# Patient Record
Sex: FEMALE | Race: WHITE | NOT HISPANIC OR LATINO | Employment: UNEMPLOYED | URBAN - METROPOLITAN AREA
[De-identification: names, ages, dates, MRNs, and addresses within clinical notes are randomized per-mention and may not be internally consistent; named-entity substitution may affect disease eponyms.]

---

## 2016-02-16 LAB — PAP SMEAR - HIM PATIENT REPORTED: NEGATIVE

## 2017-02-15 ENCOUNTER — TELEPHONE (OUTPATIENT)
Dept: PALLIATIVE MEDICINE | Facility: CLINIC | Age: 22
End: 2017-02-15
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
## 2017-02-17 ENCOUNTER — TELEPHONE (OUTPATIENT)
Dept: ORTHOPEDICS | Facility: CLINIC | Age: 22
End: 2017-02-17
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
## 2018-03-27 LAB
ABO + RH BLD: NORMAL
ABO + RH BLD: NORMAL
BLD GP AB SCN SERPL QL: NEGATIVE
HBV SURFACE AG SERPL QL IA: NEGATIVE
HIV 1+2 AB+HIV1 P24 AG SERPL QL IA: NEGATIVE
RUBELLA ANTIBODY IGG QUANTITATIVE: NORMAL IU/ML
T PALLIDUM IGG SER QL: NEGATIVE

## 2018-05-13 ENCOUNTER — NURSE TRIAGE (OUTPATIENT)
Dept: NURSING | Facility: CLINIC | Age: 23
End: 2018-05-13
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
## 2018-06-04 ENCOUNTER — PRE VISIT (OUTPATIENT)
Dept: MATERNAL FETAL MEDICINE | Facility: CLINIC | Age: 23
End: 2018-06-04

## 2018-06-11 ENCOUNTER — OFFICE VISIT (OUTPATIENT)
Dept: MATERNAL FETAL MEDICINE | Facility: CLINIC | Age: 23
End: 2018-06-11
Attending: REGISTERED NURSE
Payer: COMMERCIAL

## 2018-06-11 ENCOUNTER — HOSPITAL ENCOUNTER (OUTPATIENT)
Dept: ULTRASOUND IMAGING | Facility: CLINIC | Age: 23
Discharge: HOME | End: 2018-06-11
Attending: REGISTERED NURSE | Admitting: REGISTERED NURSE
Payer: COMMERCIAL

## 2018-06-11 DIAGNOSIS — O26.90 PREGNANCY RELATED CONDITION, ANTEPARTUM: ICD-10-CM

## 2018-06-11 DIAGNOSIS — O35.9XX0 FETAL ABNORMALITY AFFECTING MANAGEMENT OF MOTHER, ANTEPARTUM, SINGLE OR UNSPECIFIED FETUS: ICD-10-CM

## 2018-06-11 PROCEDURE — 76811 OB US DETAILED SNGL FETUS: CPT

## 2018-06-11 NOTE — MR AVS SNAPSHOT
After Visit Summary   6/11/2018    Katia Rivzi    MRN: [id number]           Patient Information     Date Of Birth          1995        Visit Information        Provider Department      6/11/2018 11:30 AM Coral Merrill MD [facility] Maternal Fetal Medicine [facility]        Today's Diagnoses     Evaluate anatomy not seen on prior sonogram    -  1    Fetal abnormality affecting management of mother, antepartum, single or unspecified fetus           Follow-ups after your visit        Your next 10 appointments already scheduled     Jul 02, 2018 11:00 AM CDT   MFM US COMPRE SINGLE F/U with [id number]   [facility] Fetal Medicine Ultrasound - [facility] ([facility])    [address] [place] [address] [address]  [facility] [id number] [phone]           Wear comfortable clothes and leave your valuables at home.            Jul 02, 2018 11:30 AM CDT   Radiology MD with SH PATRICK LANDRY   [facility] Maternal Fetal Medicine [facility] ([facility])    [address] [place] [address] [address]  [facility] [id number] [phone]           Please arrive at the time given for your first appointment. This visit is used internally to schedule the physician's time during your ultrasound.              Future tests that were ordered for you today     Open Future Orders        Priority Expected Expires Ordered    MFM US Comprehensive Single F/U Routine 7/2/2018 6/11/2019 6/11/2018            Who to contact     If you have questions or need follow up information about today's clinic visit or your schedule please contact [facility]NAL FET[facility] directly at [phone].  Normal or non-critical lab and imaging results will be communicated to you by MyChart, letter or phone within 4 business days after the clinic has received the results. If you do not hear from us within 7 days, please contact the clinic through MyChart or phone. If you have a critical or abnormal lab  "result, we will notify you by phone as soon as possible.  Submit refill requests through [employer] or call your pharmacy and they will forward the refill request to us. Please allow 3 business days for your refill to be completed.          Additional Information About Your Visit        [employer] Information     [employer] lets you send messages to your doctor, view your test results, renew your prescriptions, schedule appointments and more. To sign up, go to [email or web address] . Click on \"Log in\" on the left side of the screen, which will take you to the Welcome page. Then click on \"Sign up Now\" on the right side of the page.     You will be asked to enter the access code listed below, as well as some personal information. Please follow the directions to create your username and password.     Your access code is: [email or web address]  Expires: 2018  3:09 PM     Your access code will  in 90 days. If you need help or a new code, please call your [place] clinic or [phone].        Care EveryWhere ID     This is your Care EveryWhere ID. This could be used by other organizations to access your [place] medical records  [id number]         Blood Pressure from Last 3 Encounters:   16 130/78   16 130/82   16 136/88    Weight from Last 3 Encounters:   16 113.4 kg (250 lb)   16 118.2 kg (260 lb 9.6 oz)   16 (!) 138.3 kg (305 lb)                 Today's Medication Changes          These changes are accurate as of 18  3:09 PM.  If you have any questions, ask your nurse or doctor.               These medicines have changed or have updated prescriptions.        Dose/Directions    cyclobenzaprine 10 MG tablet   Commonly known as:  FLEXERIL   This may have changed:  how much to take   Used for:  Neck pain        Dose:  10 mg   Take 1 tablet (10 mg) by mouth nightly as needed for muscle spasms   Quantity:  30 tablet   Refills:  0                Primary Care Provider Fax #    Physician " No Ref-Primary [phone]       No address on file        Equal Access to Services     NATHAN BERNARDBRINDA : Hadii eddie william marquita Sojohn, wapremada luqadaha, qaglynnta kagregory ronaltl, waxtonja sharmainein hayaaconstance villedajoe onofre la'shayeconstance murphy. So [facility] [phone].    ATENCIÓN: Si habla español, tiene a kiran disposición servicios gratuitos de asistencia lingüística. Llame al [phone].    We comply with applicable federal civil rights laws and [place] laws. We do not discriminate on the basis of race, color, national origin, age, disability, sex, sexual orientation, or gender identity.            Thank you!     Thank you for choosing [facility]  for your care. Our goal is always to provide you with excellent care. Hearing back from our patients is one way we can continue to improve our services. Please take a few minutes to complete the written survey that you may receive in the mail after your visit with us. Thank you!             Your Updated Medication List - Protect others around you: Learn how to safely use, store and throw away your medicines at www.disposemymeds.org.          This list is accurate as of 6/11/18  3:09 PM.  Always use your most recent med list.                   Brand Name Dispense Instructions for use Diagnosis    carisoprodol 350 MG tablet    SOMA    30 tablet    Take 1 tablet (350 mg) by mouth 3 times daily as needed for muscle spasms    Cervical strain, acute, sequela       cyclobenzaprine 10 MG tablet    FLEXERIL    30 tablet    Take 1 tablet (10 mg) by mouth nightly as needed for muscle spasms    Neck pain       EXCEDRIN MIGRAINE PO      Take 1 tablet by mouth as needed        ibuprofen 800 MG tablet    ADVIL/MOTRIN    45 tablet    Take 1 tablet (800 mg) by mouth every 8 hours    Left low back pain       methocarbamol 500 MG tablet    ROBAXIN    180 tablet    Take 1-2 tablets (500-1,000 mg) by mouth 3 times daily as needed for muscle spasms    Muscle spasm       order for  DME     1 Device    Equipment being ordered: cervical collar    Cervical strain, acute, sequela       topiramate 25 MG tablet    TOPAMAX    70 tablet    Take 1 tablet (25 mg) at bedtime for 1 week, then 1 tablet twice daily for 1 week, then 1 tablet in AM and 2 in PM for 1 week, then 2 tablets twice daily.    Chronic tension-type headache, intractable

## 2018-06-11 NOTE — PROGRESS NOTES
Please see full imaging report from ViewPoint program under imaging tab.      Coral Merrill MD  Maternal Fetal Medicine

## 2018-06-21 ENCOUNTER — TRANSFERRED RECORDS (OUTPATIENT)
Dept: HEALTH INFORMATION MANAGEMENT | Facility: CLINIC | Age: 23
End: 2018-06-21

## 2018-07-02 ENCOUNTER — HOSPITAL ENCOUNTER (OUTPATIENT)
Dept: ULTRASOUND IMAGING | Facility: CLINIC | Age: 23
Discharge: HOME | End: 2018-07-02
Attending: OBSTETRICS & GYNECOLOGY | Admitting: OBSTETRICS & GYNECOLOGY
Payer: COMMERCIAL

## 2018-07-02 ENCOUNTER — OFFICE VISIT (OUTPATIENT)
Dept: MATERNAL FETAL MEDICINE | Facility: CLINIC | Age: 23
End: 2018-07-02
Attending: OBSTETRICS & GYNECOLOGY
Payer: COMMERCIAL

## 2018-07-02 DIAGNOSIS — Z03.73 SUSPECTED FETAL ANOMALY NOT FOUND: ICD-10-CM

## 2018-07-02 DIAGNOSIS — O99.210 MATERNAL OBESITY AFFECTING PREGNANCY, ANTEPARTUM: Primary | ICD-10-CM

## 2018-07-02 DIAGNOSIS — O35.9XX0 SUSPECTED FETAL ABNORMALITY AFFECTING MANAGEMENT OF MOTHER, ANTEPARTUM, SINGLE OR UNSPECIFIED FETUS: ICD-10-CM

## 2018-07-02 PROCEDURE — 76816 OB US FOLLOW-UP PER FETUS: CPT

## 2018-07-02 NOTE — PROGRESS NOTES
Please refer to ultrasound report under 'Imaging' Studies of 'Chart Review' tabs.    Arturo Rocha M.D.

## 2018-07-02 NOTE — MR AVS SNAPSHOT
"                  MRN:[id number]                      After Visit Summary   2018    Katia Rizvi    MRN: [id number]           Visit Information        Provider Department      2018 11:30 AM Arturo Rocha MD [facility] Maternal Fetal [facility]        Anshul Information     [employer] lets you send messages to your doctor, view your test results, renew your prescriptions, schedule appointments and more. To sign up, go to [email or web address] . Click on \"Log in\" on the left side of the screen, which will take you to the Welcome page. Then click on \"Sign up Now\" on the right side of the page.     You will be asked to enter the access code listed below, as well as some personal information. Please follow the directions to create your username and password.     Your access code is: [email or web address]  Expires: 2018  3:09 PM     Your access code will  in 90 days. If you need help or a new code, please call your [place] clinic or [phone].        Care EveryWhere ID     This is your Care EveryWhere ID. This could be used by other organizations to access your [place] medical records  [id number]        Equal Access to Services     NATHAN ROSAS AH: Brianna Samuel, waezekiel allen, qalana kaalmada hi, james murphy. So [facility] [phone].    ATENCIÓN: Si habla español, tiene a kiran disposición servicios gratuitos de asistencia lingüística. Llame al [phone].    We comply with applicable federal civil rights laws and [place] laws. We do not discriminate on the basis of race, color, national origin, age, disability, sex, sexual orientation, or gender identity.            "

## 2018-07-12 ENCOUNTER — TRANSFERRED RECORDS (OUTPATIENT)
Dept: MATERNAL FETAL MEDICINE | Facility: CLINIC | Age: 23
End: 2018-07-12

## 2018-07-18 ENCOUNTER — HOSPITAL ENCOUNTER (EMERGENCY)
Facility: CLINIC | Age: 23
Discharge: HOME | End: 2018-07-18
Attending: EMERGENCY MEDICINE | Admitting: EMERGENCY MEDICINE
Payer: COMMERCIAL

## 2018-07-18 ENCOUNTER — APPOINTMENT (OUTPATIENT)
Dept: ULTRASOUND IMAGING | Facility: CLINIC | Age: 23
End: 2018-07-18
Attending: EMERGENCY MEDICINE
Payer: COMMERCIAL

## 2018-07-18 VITALS
SYSTOLIC BLOOD PRESSURE: 112 MMHG | HEART RATE: 94 BPM | DIASTOLIC BLOOD PRESSURE: 55 MMHG | WEIGHT: 291.01 LBS | BODY MASS INDEX: 54.99 KG/M2 | RESPIRATION RATE: 18 BRPM | TEMPERATURE: 97.2 F | OXYGEN SATURATION: 98 %

## 2018-07-18 DIAGNOSIS — Z3A.24 24 WEEKS GESTATION OF PREGNANCY: ICD-10-CM

## 2018-07-18 DIAGNOSIS — K21.9 GASTROESOPHAGEAL REFLUX DISEASE, ESOPHAGITIS PRESENCE NOT SPECIFIED: ICD-10-CM

## 2018-07-18 DIAGNOSIS — K80.20 CALCULUS OF GALLBLADDER WITHOUT CHOLECYSTITIS WITHOUT OBSTRUCTION: ICD-10-CM

## 2018-07-18 LAB
ALBUMIN SERPL-MCNC: 2.7 G/DL (ref 3.4–5)
ALBUMIN UR-MCNC: 30 MG/DL
ALP SERPL-CCNC: 60 U/L (ref 40–150)
ALT SERPL W P-5'-P-CCNC: 15 U/L (ref 0–50)
ANION GAP SERPL CALCULATED.3IONS-SCNC: 11 MMOL/L (ref 3–14)
APPEARANCE UR: ABNORMAL
AST SERPL W P-5'-P-CCNC: 15 U/L (ref 0–45)
BASOPHILS # BLD AUTO: 0 10E9/L (ref 0–0.2)
BASOPHILS NFR BLD AUTO: 0.1 %
BILIRUB SERPL-MCNC: 0.3 MG/DL (ref 0.2–1.3)
BILIRUB UR QL STRIP: NEGATIVE
BUN SERPL-MCNC: 8 MG/DL (ref 7–30)
CALCIUM SERPL-MCNC: 8.5 MG/DL (ref 8.5–10.1)
CAOX CRY #/AREA URNS HPF: ABNORMAL /HPF
CHLORIDE SERPL-SCNC: 108 MMOL/L (ref 94–109)
CO2 SERPL-SCNC: 22 MMOL/L (ref 20–32)
COLOR UR AUTO: YELLOW
CREAT SERPL-MCNC: 0.61 MG/DL (ref 0.52–1.04)
DIFFERENTIAL METHOD BLD: ABNORMAL
EOSINOPHIL # BLD AUTO: 0.2 10E9/L (ref 0–0.7)
EOSINOPHIL NFR BLD AUTO: 1.1 %
ERYTHROCYTE [DISTWIDTH] IN BLOOD BY AUTOMATED COUNT: 13.3 % (ref 10–15)
GFR SERPL CREATININE-BSD FRML MDRD: >90 ML/MIN/1.7M2
GLUCOSE SERPL-MCNC: 105 MG/DL (ref 70–99)
GLUCOSE UR STRIP-MCNC: NEGATIVE MG/DL
HCT VFR BLD AUTO: 30.3 % (ref 35–47)
HGB BLD-MCNC: 10.2 G/DL (ref 11.7–15.7)
HGB UR QL STRIP: NEGATIVE
IMM GRANULOCYTES # BLD: 0.1 10E9/L (ref 0–0.4)
IMM GRANULOCYTES NFR BLD: 0.7 %
INTERPRETATION ECG - MUSE: NORMAL
KETONES UR STRIP-MCNC: 10 MG/DL
LEUKOCYTE ESTERASE UR QL STRIP: NEGATIVE
LYMPHOCYTES # BLD AUTO: 3 10E9/L (ref 0.8–5.3)
LYMPHOCYTES NFR BLD AUTO: 20 %
MCH RBC QN AUTO: 30.3 PG (ref 26.5–33)
MCHC RBC AUTO-ENTMCNC: 33.7 G/DL (ref 31.5–36.5)
MCV RBC AUTO: 90 FL (ref 78–100)
MONOCYTES # BLD AUTO: 1.2 10E9/L (ref 0–1.3)
MONOCYTES NFR BLD AUTO: 8.2 %
MUCOUS THREADS #/AREA URNS LPF: PRESENT /LPF
NEUTROPHILS # BLD AUTO: 10.3 10E9/L (ref 1.6–8.3)
NEUTROPHILS NFR BLD AUTO: 69.9 %
NITRATE UR QL: NEGATIVE
NRBC # BLD AUTO: 0 10*3/UL
NRBC BLD AUTO-RTO: 0 /100
PH UR STRIP: 6 PH (ref 5–7)
PLATELET # BLD AUTO: 262 10E9/L (ref 150–450)
POTASSIUM SERPL-SCNC: 3.6 MMOL/L (ref 3.4–5.3)
PROT SERPL-MCNC: 6.3 G/DL (ref 6.8–8.8)
RBC # BLD AUTO: 3.37 10E12/L (ref 3.8–5.2)
RBC #/AREA URNS AUTO: 0 /HPF (ref 0–2)
SODIUM SERPL-SCNC: 141 MMOL/L (ref 133–144)
SOURCE: ABNORMAL
SP GR UR STRIP: 1.02 (ref 1–1.03)
SPERM #/AREA URNS HPF: PRESENT /HPF
UROBILINOGEN UR STRIP-MCNC: 2 MG/DL (ref 0–2)
WBC # BLD AUTO: 14.7 10E9/L (ref 4–11)
WBC #/AREA URNS AUTO: 3 /HPF (ref 0–5)

## 2018-07-18 PROCEDURE — 99285 EMERGENCY DEPT VISIT HI MDM: CPT | Mod: 25

## 2018-07-18 PROCEDURE — 93005 ELECTROCARDIOGRAM TRACING: CPT

## 2018-07-18 PROCEDURE — 76705 ECHO EXAM OF ABDOMEN: CPT

## 2018-07-18 PROCEDURE — 83690 ASSAY OF LIPASE: CPT | Performed by: EMERGENCY MEDICINE

## 2018-07-18 PROCEDURE — 80053 COMPREHEN METABOLIC PANEL: CPT | Performed by: EMERGENCY MEDICINE

## 2018-07-18 PROCEDURE — 85025 COMPLETE CBC W/AUTO DIFF WBC: CPT | Performed by: EMERGENCY MEDICINE

## 2018-07-18 PROCEDURE — 81001 URINALYSIS AUTO W/SCOPE: CPT | Performed by: EMERGENCY MEDICINE

## 2018-07-18 RX ORDER — ONDANSETRON 4 MG/1
4 TABLET, ORALLY DISINTEGRATING ORAL EVERY 8 HOURS PRN
Qty: 10 TABLET | Refills: 0 | Status: SHIPPED | OUTPATIENT
Start: 2018-07-18 | End: 2018-07-21

## 2018-07-18 RX ORDER — ONDANSETRON 4 MG/1
4 TABLET, ORALLY DISINTEGRATING ORAL ONCE
Status: DISCONTINUED | OUTPATIENT
Start: 2018-07-18 | End: 2018-07-18

## 2018-07-18 RX ORDER — HYDROCODONE BITARTRATE AND ACETAMINOPHEN 5; 325 MG/1; MG/1
1 TABLET ORAL EVERY 6 HOURS PRN
Qty: 10 TABLET | Refills: 0 | Status: SHIPPED | OUTPATIENT
Start: 2018-07-18 | End: 2018-07-30

## 2018-07-18 ASSESSMENT — ENCOUNTER SYMPTOMS
FLANK PAIN: 0
VOMITING: 0
SHORTNESS OF BREATH: 0
NAUSEA: 0
ABDOMINAL PAIN: 1
BACK PAIN: 1
DYSURIA: 1
DIARRHEA: 1
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
## 2018-07-18 NOTE — ED AVS SNAPSHOT
Emergency Department    [address] [facility] [address]    Phone:  [phone]    Fax:  [phone]                                       Katia Rizvi   MRN: [id number]    Department:   Emergency Department   Date of Visit:  7/18/2018           Patient Information     Date Of Birth          1995        Your diagnoses for this visit were:     Gastroesophageal reflux disease, esophagitis presence not specified     Calculus of gallbladder without cholecystitis without obstruction     24 weeks gestation of pregnancy        You were seen by Shahzad Snow MD.      Follow-up Information     Follow up with your ob/gyn. Call today.    Why:  to discuss symptoms and to schedule follow up        Follow up with [facility]. Schedule an appointment as soon as possible for a visit in 1 week.    Contact information:    [address], [address]  [facility] [address] [phone]        Follow up with  Emergency Department.    Specialty:  EMERGENCY MEDICINE    Why:  If symptoms worsen    Contact information:    [address] [facility] [address] [phone]        Discharge Instructions         Gallstones with Biliary Colic    You have abdominal pain due to irritation and spasm of the gallbladder. This is called biliary colic. The gallbladder is a small sac under the liver, which stores and releases a fluid that aids in the digestion of fat. A collection of crystals may form stones inside the gallbladder (gallstones). Gallstones can cause the gallbladder to spasm. If they block the duct out of the gallbladder, they can cause pain and even an infection.   A number of factors increase the risk for having gallstones:    Being female    Being severely overweight (obese)    Older age    Losing or gaining weight quickly    Eating a high-calorie diet    Being pregnant    Taking hormone therapy    Having diabetes  Home care    Rest in bed.    Drink only  clear liquids until you feel better.    You may have been prescribed medicine for pain or nausea. Take these as directed.    Fat in your diet makes the gallbladder contract and may cause increased pain. Avoid foods that are high in fat (such as full-fat dairy, fried foods, and fatty meats) for at least two days.    If you are overweight, talk to your healthcare provider about losing weight.  Follow-up care  Follow up with your healthcare provider or as advised. There is a chance that you will have another episode of pain from your gallstones at some point. Removal of the gallbladder is an option to prevent this. Talk with your healthcare provider about your treatment options.  When to seek medical advice  Call your healthcare provider if any of the following occur:    Worsening pain or pain lasting for longer than 6 hours    Pain moving to the right lower abdomen    Repeated vomiting    Swollen abdomen    Fever of 100.4 F (38 C) or higher, or as directed by your healthcare provider    Very dark urine, light colored stools, or yellow color of the skin or eyes    Chest, arm, back, neck or jaw pain  Date Last Reviewed: 6/18/2015 2000-2017 [employer]. [address] [place] [address]. All rights reserved. This information is not intended as a substitute for professional medical care. Always follow your healthcare professional's instructions.          GERD (Adult)    The esophagus is a tube that carries food from the mouth to the stomach. A valve at the lower end of the esophagus prevents stomach acid from flowing upward. When this valve doesn't work properly, stomach contents may repeatedly flow back up (reflux) into the esophagus. This is called gastroesophageal reflux disease (GERD). GERD can irritate the esophagus. It can cause problems with swallowing or breathing. In severe cases, GERD can cause recurrent pneumonia or other serious problems.  Symptoms of reflux include burning, pressure  "or sharp pain in the upper abdomen or mid to lower chest. The pain can spread to the neck, back, or shoulder. There may be belching, an acid taste in the back of the throat, chronic cough, or sore throat or hoarseness. GERD symptoms often occur during the day after a big meal. They can also occur at night when lying down.   Home care  Lifestyle changes can help reduce symptoms. If needed, medicines may be prescribed. Symptoms often improve with treatment, but if treatment is stopped, the symptoms often return after a few months. So most persons with GERD will need to continue treatment.  Lifestyle changes    Limit or avoid fatty, fried, and spicy foods, as well as coffee, chocolate, mint, and foods with high acid content such as tomatoes and citrus fruit and juices (orange, grapefruit, lemon).    Don t eat large meals, especially at night. Frequent, smaller meals are best. Do not lie down right after eating. And don t eat anything 3 hours before going to bed.    Avoid drinking alcohol and smoking. As much as possible, stay away from second hand smoke.    If you are overweight, losing weight will reduce symptoms.     Avoid wearing tight clothing around your stomach area.    If your symptoms occur during sleep, use a foam wedge to elevate your upper body (not just your head.) Or, place 4\" blocks under the head of your bed.  Medicines  If needed, medicines can help relieve the symptoms of GERD and prevent damage to the esophagus. Discuss a medicine plan with your healthcare provider. This may include one or more of the following medicines:    Antacids to help neutralize the normal acids in your stomach.    Acid blockers (H2 blockers) to decrease acid production.    Acid inhibitors (PPIs) to decrease acid production in a different way than the blockers. They may work better, but can take a little longer to take effect.  Take an antacid 30-60 minutes after eating and at bedtime, but not at the same time as an acid " blocker.  Try not to take medicines such as ibuprofen and aspirin. If you are taking aspirin for your heart or other medical reasons, talk to your healthcare provider about stopping it.  Follow-up care  Follow up with your healthcare provider or as advised by our staff.  When to seek medical advice  Call your healthcare provider if any of the following occur:    Stomach pain gets worse or moves to the lower right abdomen (appendix area)    Chest pain appears or gets worse, or spreads to the back, neck, shoulder, or arm    Frequent vomiting (can t keep down liquids)    Blood in the stool or vomit (red or black in color)    Feeling weak or dizzy    Fever of 100.4 F (38 C) or higher, or as directed by your healthcare provider  Date Last Reviewed: 6/23/2015 2000-2017 [employer]. [address] [place] [address], [place] [address]. All rights reserved. This information is not intended as a substitute for professional medical care. Always follow your healthcare professional's instructions.          Discharge Instructions for Gallstones  Gallstones form when liquid stored in the gallbladder hardens into pieces of stone-like material. Stones in the gallbladder may or may not cause symptoms. They can cause pain or infection. You and your healthcare provider will decide on the best treatment for you. Here's what you can do.  Home care  These home care steps can help you after being diagnosed with gallstones:    Eat a low-fat diet.  ? Read food labels to be sure the foods you are choosing are low in fat.  ? Limit the use of high-fat meats, dairy products, animal fats, and vegetable oils.    Keep all appointments with your healthcare provider. Your healthcare provider needs to monitor your condition.    Discuss your treatment choices with your healthcare provider, including:  ? Surgery to remove the gallbladder and gallstones  ? Medicine to dissolve the stones. This is mainly for people who cannot have surgery. Take  your medicines exactly as directed. Don't skip doses. Remember, it takes time for the medicine to take effect. Unfortunately, once the medicine is stopped, the gallstones usually come back.   ? ERCP (endoscopic retrograde cholangiopancreatography). A healthcare provider uses a thin tube with video and X-rays to locate stones and remove them from the common bile duct.  Follow-up care  Make a follow-up appointment as directed by our staff.     When to call your healthcare provider  Call your healthcare provider right away if you have any of the following:    Severe pain in the upper belly, shoulder, or back    Fever of 100.4 F (38 C) or higher, or as directed by your healthcare provider    Nausea or vomiting    Yellowing of your skin or eyes (jaundice)   Date Last Reviewed: 7/1/2016 2000-2017 [employer] [address] [place] [address]. All rights reserved. This information is not intended as a substitute for professional medical care. Always follow your healthcare professional's instructions.          Adapting to Pregnancy: Second Trimester    Keep up the healthy habits you started in your first trimester. You might be a little more tired than normal. So plan your day wisely. Look at the tips below and choose the ones that suit your lifestyle.  If you have any questions, check with your healthcare provider.   If you work  If you can, adjust your work with your employer to fit your needs. Try these tips:    If you stand for long periods, find ways to do some tasks while sitting. Also, try to stand with 1 foot resting on a low stool or ledge. Shift your weight from foot to foot often. Wear low-heeled shoes.    If you sit, keep your knees level with your hips. Rest your feet on a firm surface. Sit tall with support for your low back.    If you work long hours, ask about adjusting your schedule. Try taking shorter breaks more often.  When you travel  The second trimester may be the best time  for any travel. Talk to your healthcare provider about any special plans you may need to make. Always:    Wear a seat belt. Fasten the lap part under your belly. Wear the shoulder part also.    Take breaks often during long trips by car or plane. Move around to stretch your legs.    Drink plenty of fluids on flights. The air in plane cabins is very dry.    Avoid hot climates or high altitudes if you are not used to them.    Avoid places where the food and water might make you sick.    Make sure you are up-to-date on all immunizations, including the flu vaccine. This is especially important when traveling overseas.  Taking time to relax  Find time to rest and relax at work or at home:    Take short time-outs daily. Do relaxation exercises.    Breathe deeply during stressful times.    Try not to take on too much. Plan tasks for times when you have the most energy.    Take naps when you can. Or just sit and relax.    After week 16, avoid lying on your back for more than a few minutes. Instead, lie on your side. Switch sides often.  Continuing as lovers  Unless your healthcare provider tells you otherwise, there is no reason to stop having sex now. Blood supply increases to the pelvic area in the second trimester. Because of this, sex might be more enjoyable. Try different positions and see what s best. Also, talk to your partner about any changes in desire. Spotting may happen after sex. Be sure to let your healthcare provider know if there is heavy bleeding.  Keeping your environment safe  You can still clean house and use scented products. Just take some simple precautions:    Wear gloves when using cleaning fluids.    Open windows to let in fresh air. Use a fan if you paint.    Avoid secondhand smoke.    Don t breathe fumes from nail polish, hair spray, cleansers, or other chemicals.  Date Last Reviewed: 1/1/2018 2000-2017 The [employer] [address] [facility], [place] [address]. All rights reserved.  This information is not intended as a substitute for professional medical care. Always follow your healthcare professional's instructions.          24 Hour Appointment Hotline       To make an appointment at any [facility], call [phone] ([phone]). If you don't have a family doctor or clinic, we will help you find one. [facility] are conveniently located to serve the needs of you and your family.             Review of your medicines      START taking        Dose / Directions Last dose taken    HYDROcodone-acetaminophen 5-325 MG per tablet   Commonly known as:  NORCO   Dose:  1 tablet   Quantity:  10 tablet        Take 1 tablet by mouth every 6 hours as needed for severe pain   Refills:  0        ondansetron 4 MG ODT tab   Commonly known as:  ZOFRAN ODT   Dose:  4 mg   Quantity:  10 tablet        Take 1 tablet (4 mg) by mouth every 8 hours as needed for nausea   Refills:  0        ranitidine 150 MG tablet   Commonly known as:  ZANTAC   Dose:  150 mg   Quantity:  20 tablet        Take 1 tablet (150 mg) by mouth 2 times daily for 10 days   Refills:  0          Our records show that you are taking the medicines listed below. If these are incorrect, please call your family doctor or clinic.        Dose / Directions Last dose taken    carisoprodol 350 MG tablet   Commonly known as:  SOMA   Dose:  350 mg   Quantity:  30 tablet        Take 1 tablet (350 mg) by mouth 3 times daily as needed for muscle spasms   Refills:  0        cyclobenzaprine 10 MG tablet   Commonly known as:  FLEXERIL   Dose:  10 mg   Quantity:  30 tablet        Take 1 tablet (10 mg) by mouth nightly as needed for muscle spasms   Refills:  0        EXCEDRIN MIGRAINE PO   Dose:  1 tablet        Take 1 tablet by mouth as needed   Refills:  0        ibuprofen 800 MG tablet   Commonly known as:  ADVIL/MOTRIN   Dose:  800 mg   Quantity:  45 tablet        Take 1 tablet (800 mg) by mouth every 8 hours   Refills:  0        methocarbamol  500 MG tablet   Commonly known as:  ROBAXIN   Dose:  500-1000 mg   Quantity:  180 tablet        Take 1-2 tablets (500-1,000 mg) by mouth 3 times daily as needed for muscle spasms   Refills:  1        order for DME   Quantity:  1 Device        Equipment being ordered: cervical collar   Refills:  0        topiramate 25 MG tablet   Commonly known as:  TOPAMAX   Quantity:  70 tablet        Take 1 tablet (25 mg) at bedtime for 1 week, then 1 tablet twice daily for 1 week, then 1 tablet in AM and 2 in PM for 1 week, then 2 tablets twice daily.   Refills:  0                Information about OPIOIDS     PRESCRIPTION OPIOIDS: WHAT YOU NEED TO KNOW   We gave you an opioid (narcotic) pain medicine. It is important to manage your pain, but opioids are not always the best choice. You should first try all the other options your care team gave you. Take this medicine for as short a time (and as few doses) as possible.     These medicines have risks:    DO NOT drive when on new or higher doses of pain medicine. These medicines can affect your alertness and reaction times, and you could be arrested for driving under the influence (DUI). If you need to use opioids long-term, talk to your care team about driving.    DO NOT operate heave machinery    DO NOT do any other dangerous activities while taking these medicines.     DO NOT drink any alcohol while taking these medicines.      If the opioid prescribed includes acetaminophen, DO NOT take with any other medicines that contain acetaminophen. Read all labels carefully. Look for the word  acetaminophen  or  Tylenol.  Ask your pharmacist if you have questions or are unsure.    You can get addicted to pain medicines, especially if you have a history of addiction (chemical, alcohol or substance dependence). Talk to your care team about ways to reduce this risk.    Store your pills in a secure place, locked if possible. We will not replace any lost or stolen medicine. If you don t finish  your medicine, please throw away (dispose) as directed by your pharmacist. The [place] [employer]ution [employer] has more information about safe disposal: https://www.pca.[facility].[place].us/living-green/managing-unwanted-medications.     All opioids tend to cause constipation. Drink plenty of water and eat foods that have a lot of fiber, such as fruits, vegetables, prune juice, apple juice and high-fiber cereal. Take a laxative (Miralax, milk of magnesia, Colace, Senna) if you don t move your bowels at least every other day.         Prescriptions were sent or printed at these locations (3 Prescriptions)                   Other Prescriptions                Printed at Department/Unit printer (3 of 3)         HYDROcodone-acetaminophen (NORCO) 5-325 MG per tablet               ondansetron (ZOFRAN ODT) 4 MG ODT tab               ranitidine (ZANTAC) 150 MG tablet                Procedures and tests performed during your visit     Abdomen US, limited (RUQ only)    EKG 12-lead, tracing only    UA reflex to Microscopic and Culture      Orders Needing Specimen Collection     None      Pending Results     No orders found from 7/16/2018 to 7/19/2018.            Pending Culture Results     No orders found from 7/16/2018 to 7/19/2018.            Pending Results Instructions     If you had any lab results that were not finalized at the time of your Discharge, you can call the ED Lab Result RN at [phone]. You will be contacted by this team for any positive Lab results or changes in treatment. The nurses are available 7 days a week from 10A to 6:30P.  You can leave a message 24 hours per day and they will return your call.        Test Results From Your Hospital Stay        7/18/2018  5:37 AM      Narrative     US ABDOMEN LIMITED  7/18/2018 5:13 AM    CLINICAL INFORMATION: Right upper quadrant pain, twenty-four weeks  gestation.    COMPARISON: None.    FINDINGS: Limited right upper quadrant ultrasound demonstrates  multiple small  gallstones. No gallbladder wall thickening or  sonographic Esparza's sign. No bile duct dilatation. Fatty infiltration  of the liver. Visualized portions of the pancreas are negative.  Pancreatic tail is obscured by gas. The visualized portion of the  right kidney is unremarkable. No hydronephrosis on the right.        Impression     IMPRESSION:  1. Cholelithiasis without sonographic evidence of cholecystitis.  2. Fatty infiltration of the liver.    BAUTISTA BOYLE MD         7/18/2018  4:55 AM      Component Results     Component Value Ref Range & Units Status    Color Urine Yellow  Final    Appearance Urine Slightly Cloudy  Final    Glucose Urine Negative NEG^Negative mg/dL Final    Bilirubin Urine Negative NEG^Negative Final    Ketones Urine 10 (A) NEG^Negative mg/dL Final    Specific Gravity Urine 1.023 1.003 - 1.035 Final    Blood Urine Negative NEG^Negative Final    pH Urine 6.0 5.0 - 7.0 pH Final    Protein Albumin Urine 30 (A) NEG^Negative mg/dL Final    Urobilinogen mg/dL 2.0 0.0 - 2.0 mg/dL Final    Nitrite Urine Negative NEG^Negative Final    Leukocyte Esterase Urine Negative NEG^Negative Final    Source Midstream Urine  Final    RBC Urine 0 0 - 2 /HPF Final    WBC Urine 3 0 - 5 /HPF Final    Mucous Urine Present (A) NEG^Negative /LPF Final    sperm Present (A) NEG^Negative /HPF Final    Calcium Oxalate Moderate (A) NEG^Negative /HPF Final                Clinical Quality Measure: Blood Pressure Screening     Your blood pressure was checked while you were in the emergency department today. The last reading we obtained was  BP: 112/55 . Please read the guidelines below about what these numbers mean and what you should do about them.  If your systolic blood pressure (the top number) is less than 120 and your diastolic blood pressure (the bottom number) is less than 80, then your blood pressure is normal. There is nothing more that you need to do about it.  If your systolic blood pressure (the top number) is  "120-139 or your diastolic blood pressure (the bottom number) is 80-89, your blood pressure may be higher than it should be. You should have your blood pressure rechecked within a year by a primary care provider.  If your systolic blood pressure (the top number) is 140 or greater or your diastolic blood pressure (the bottom number) is 90 or greater, you may have high blood pressure. High blood pressure is treatable, but if left untreated over time it can put you at risk for heart attack, stroke, or kidney failure. You should have your blood pressure rechecked by a primary care provider within the next 4 weeks.  If your provider in the emergency department today gave you specific instructions to follow-up with your doctor or provider even sooner than that, you should follow that instruction and not wait for up to 4 weeks for your follow-up visit.        Thank you for choosing [facility]       Thank you for choosing [facility] for your care. Our goal is always to provide you with excellent care. Hearing back from our patients is one way we can continue to improve our services. Please take a few minutes to complete the written survey that you may receive in the mail after you visit with us. Thank you!        [employer] Information     [employer] lets you send messages to your doctor, view your test results, renew your prescriptions, schedule appointments and more. To sign up, go to [email or web address] . Click on \"Log in\" on the left side of the screen, which will take you to the Welcome page. Then click on \"Sign up Now\" on the right side of the page.     You will be asked to enter the access code listed below, as well as some personal information. Please follow the directions to create your username and password.     Your access code is: [email or web address]  Expires: 2018  3:09 PM     Your access code will  in 90 days. If you need help or a new code, please call your [facility] clinic or [phone].        Care EveryWhere " ID     This is your Care EveryWhere ID. This could be used by other organizations to access your [facility] medical records  [id number]        Equal Access to Services     NATHAN ROSAS : Brianna Samuel, gila allen, james sesay. So [facility] [phone].    ATENCIÓN: Si habla español, tiene a kiran disposición servicios gratuitos de asistencia lingüística. Llame al [phone].    We comply with applicable federal civil rights laws and [place] laws. We do not discriminate on the basis of race, color, national origin, age, disability, sex, sexual orientation, or gender identity.            After Visit Summary       This is your record. Keep this with you and show to your community pharmacist(s) and doctor(s) at your next visit.

## 2018-07-18 NOTE — DISCHARGE INSTRUCTIONS
Gallstones with Biliary Colic    You have abdominal pain due to irritation and spasm of the gallbladder. This is called biliary colic. The gallbladder is a small sac under the liver, which stores and releases a fluid that aids in the digestion of fat. A collection of crystals may form stones inside the gallbladder (gallstones). Gallstones can cause the gallbladder to spasm. If they block the duct out of the gallbladder, they can cause pain and even an infection.   A number of factors increase the risk for having gallstones:    Being female    Being severely overweight (obese)    Older age    Losing or gaining weight quickly    Eating a high-calorie diet    Being pregnant    Taking hormone therapy    Having diabetes  Home care    Rest in bed.    Drink only clear liquids until you feel better.    You may have been prescribed medicine for pain or nausea. Take these as directed.    Fat in your diet makes the gallbladder contract and may cause increased pain. Avoid foods that are high in fat (such as full-fat dairy, fried foods, and fatty meats) for at least two days.    If you are overweight, talk to your healthcare provider about losing weight.  Follow-up care  Follow up with your healthcare provider or as advised. There is a chance that you will have another episode of pain from your gallstones at some point. Removal of the gallbladder is an option to prevent this. Talk with your healthcare provider about your treatment options.  When to seek medical advice  Call your healthcare provider if any of the following occur:    Worsening pain or pain lasting for longer than 6 hours    Pain moving to the right lower abdomen    Repeated vomiting    Swollen abdomen    Fever of 100.4 F (38 C) or higher, or as directed by your healthcare provider    Very dark urine, light colored stools, or yellow color of the skin or eyes    Chest, arm, back, neck or jaw pain  Date Last Reviewed: 6/18/2015 2000-2017 [employer]  [employer]. [address] [place] [address]. All rights reserved. This information is not intended as a substitute for professional medical care. Always follow your healthcare professional's instructions.          GERD (Adult)    The esophagus is a tube that carries food from the mouth to the stomach. A valve at the lower end of the esophagus prevents stomach acid from flowing upward. When this valve doesn't work properly, stomach contents may repeatedly flow back up (reflux) into the esophagus. This is called gastroesophageal reflux disease (GERD). GERD can irritate the esophagus. It can cause problems with swallowing or breathing. In severe cases, GERD can cause recurrent pneumonia or other serious problems.  Symptoms of reflux include burning, pressure or sharp pain in the upper abdomen or mid to lower chest. The pain can spread to the neck, back, or shoulder. There may be belching, an acid taste in the back of the throat, chronic cough, or sore throat or hoarseness. GERD symptoms often occur during the day after a big meal. They can also occur at night when lying down.   Home care  Lifestyle changes can help reduce symptoms. If needed, medicines may be prescribed. Symptoms often improve with treatment, but if treatment is stopped, the symptoms often return after a few months. So most persons with GERD will need to continue treatment.  Lifestyle changes    Limit or avoid fatty, fried, and spicy foods, as well as coffee, chocolate, mint, and foods with high acid content such as tomatoes and citrus fruit and juices (orange, grapefruit, lemon).    Don t eat large meals, especially at night. Frequent, smaller meals are best. Do not lie down right after eating. And don t eat anything 3 hours before going to bed.    Avoid drinking alcohol and smoking. As much as possible, stay away from second hand smoke.    If you are overweight, losing weight will reduce symptoms.     Avoid wearing tight clothing around your  "stomach area.    If your symptoms occur during sleep, use a foam wedge to elevate your upper body (not just your head.) Or, place 4\" blocks under the head of your bed.  Medicines  If needed, medicines can help relieve the symptoms of GERD and prevent damage to the esophagus. Discuss a medicine plan with your healthcare provider. This may include one or more of the following medicines:    Antacids to help neutralize the normal acids in your stomach.    Acid blockers (H2 blockers) to decrease acid production.    Acid inhibitors (PPIs) to decrease acid production in a different way than the blockers. They may work better, but can take a little longer to take effect.  Take an antacid 30-60 minutes after eating and at bedtime, but not at the same time as an acid blocker.  Try not to take medicines such as ibuprofen and aspirin. If you are taking aspirin for your heart or other medical reasons, talk to your healthcare provider about stopping it.  Follow-up care  Follow up with your healthcare provider or as advised by our staff.  When to seek medical advice  Call your healthcare provider if any of the following occur:    Stomach pain gets worse or moves to the lower right abdomen (appendix area)    Chest pain appears or gets worse, or spreads to the back, neck, shoulder, or arm    Frequent vomiting (can t keep down liquids)    Blood in the stool or vomit (red or black in color)    Feeling weak or dizzy    Fever of 100.4 F (38 C) or higher, or as directed by your healthcare provider  Date Last Reviewed: 6/23/2015 2000-2017 [employer]. [address] [place] [address], [place] [address]. All rights reserved. This information is not intended as a substitute for professional medical care. Always follow your healthcare professional's instructions.          Discharge Instructions for Gallstones  Gallstones form when liquid stored in the gallbladder hardens into pieces of stone-like material. Stones in the gallbladder " may or may not cause symptoms. They can cause pain or infection. You and your healthcare provider will decide on the best treatment for you. Here's what you can do.  Home care  These home care steps can help you after being diagnosed with gallstones:    Eat a low-fat diet.  ? Read food labels to be sure the foods you are choosing are low in fat.  ? Limit the use of high-fat meats, dairy products, animal fats, and vegetable oils.    Keep all appointments with your healthcare provider. Your healthcare provider needs to monitor your condition.    Discuss your treatment choices with your healthcare provider, including:  ? Surgery to remove the gallbladder and gallstones  ? Medicine to dissolve the stones. This is mainly for people who cannot have surgery. Take your medicines exactly as directed. Don't skip doses. Remember, it takes time for the medicine to take effect. Unfortunately, once the medicine is stopped, the gallstones usually come back.   ? ERCP (endoscopic retrograde cholangiopancreatography). A healthcare provider uses a thin tube with video and X-rays to locate stones and remove them from the common bile duct.  Follow-up care  Make a follow-up appointment as directed by our staff.     When to call your healthcare provider  Call your healthcare provider right away if you have any of the following:    Severe pain in the upper belly, shoulder, or back    Fever of 100.4 F (38 C) or higher, or as directed by your healthcare provider    Nausea or vomiting    Yellowing of your skin or eyes (jaundice)   Date Last Reviewed: 7/1/2016 2000-2017 [employer]. [address] [place] [address], [place] [address]. All rights reserved. This information is not intended as a substitute for professional medical care. Always follow your healthcare professional's instructions.          Adapting to Pregnancy: Second Trimester    Keep up the healthy habits you started in your first trimester. You might be a little more  tired than normal. So plan your day wisely. Look at the tips below and choose the ones that suit your lifestyle.  If you have any questions, check with your healthcare provider.   If you work  If you can, adjust your work with your employer to fit your needs. Try these tips:    If you stand for long periods, find ways to do some tasks while sitting. Also, try to stand with 1 foot resting on a low stool or ledge. Shift your weight from foot to foot often. Wear low-heeled shoes.    If you sit, keep your knees level with your hips. Rest your feet on a firm surface. Sit tall with support for your low back.    If you work long hours, ask about adjusting your schedule. Try taking shorter breaks more often.  When you travel  The second trimester may be the best time for any travel. Talk to your healthcare provider about any special plans you may need to make. Always:    Wear a seat belt. Fasten the lap part under your belly. Wear the shoulder part also.    Take breaks often during long trips by car or plane. Move around to stretch your legs.    Drink plenty of fluids on flights. The air in plane cabins is very dry.    Avoid hot climates or high altitudes if you are not used to them.    Avoid places where the food and water might make you sick.    Make sure you are up-to-date on all immunizations, including the flu vaccine. This is especially important when traveling overseas.  Taking time to relax  Find time to rest and relax at work or at home:    Take short time-outs daily. Do relaxation exercises.    Breathe deeply during stressful times.    Try not to take on too much. Plan tasks for times when you have the most energy.    Take naps when you can. Or just sit and relax.    After week 16, avoid lying on your back for more than a few minutes. Instead, lie on your side. Switch sides often.  Continuing as lovers  Unless your healthcare provider tells you otherwise, there is no reason to stop having sex now. Blood supply  increases to the pelvic area in the second trimester. Because of this, sex might be more enjoyable. Try different positions and see what s best. Also, talk to your partner about any changes in desire. Spotting may happen after sex. Be sure to let your healthcare provider know if there is heavy bleeding.  Keeping your environment safe  You can still clean house and use scented products. Just take some simple precautions:    Wear gloves when using cleaning fluids.    Open windows to let in fresh air. Use a fan if you paint.    Avoid secondhand smoke.    Don t breathe fumes from nail polish, hair spray, cleansers, or other chemicals.  Date Last Reviewed: 1/1/2018 2000-2017 [employer]. [address] [place] [address], [place] [address]. All rights reserved. This information is not intended as a substitute for professional medical care. Always follow your healthcare professional's instructions.

## 2018-07-18 NOTE — ED AVS SNAPSHOT
Emergency Department    [address] [facility] [address]    Phone:  [phone]    Fax:  [phone]                                       Katia Rizvi   MRN: [id number]    Department:   Emergency Department   Date of Visit:  7/18/2018           After Visit Summary Signature Page     I have received my discharge instructions, and my questions have been answered. I have discussed any challenges I see with this plan with the nurse or doctor.    ..........................................................................................................................................  Patient/Patient Representative Signature      ..........................................................................................................................................  Patient Representative Print Name and Relationship to Patient    ..................................................               ................................................  Date                                            Time    ..........................................................................................................................................  Reviewed by Signature/Title    ...................................................              ..............................................  Date                                                            Time

## 2018-07-18 NOTE — ED PROVIDER NOTES
History     Chief Complaint:  Abdominal pain    HPI   Katia Rizvi is a  24 week pregnant 23 year old female who presents to the emergency department today for evaluation of abdominal pain. 2 days ago the patient had upper abdominal pain that wrapped around to her back, feeling like she is being squeezed. This was not bad, and resolved on its own. Tonight at 0200 the patient started having this same pain again, but much worse. Her pain was rated at 8/10 at its worst, but is currently down to a 6/10. She denies nausea, vomiting, chest pain, trouble breathing, flank pain and trouble with her current pregnancy, although she did have a miscarriage after 9 weeks in her last pregnancy, which was very soon before becoming pregnant again. She does note very minor dysuria and some occasional diarrhea. She has no history of heart burn or gallbladder issues.     Allergies:  No Known Drug Allergies    Medications:    Aspirin-Acetaminophen-Caffeine (EXCEDRIN MIGRAINE PO)  carisoprodol (SOMA) 350 MG tablet  cyclobenzaprine (FLEXERIL) 10 MG tablet  ibuprofen (ADVIL,MOTRIN) 800 MG tablet  methocarbamol (ROBAXIN) 500 MG tablet  ORDER FOR DME  topiramate (TOPAMAX) 25 MG tablet    Past Medical History:    History reviewed. No pertinent medical history.    Past Surgical History:    D&C    Family History:    History reviewed. No pertinent family history.    Social History:  The patient was accompanied to the ED by nobody.  Smoking Status: Never Smoker  Smokeless Tobacco: Never Used  Alcohol Use: Positive   Marital Status:  Single     Review of Systems   Respiratory: Negative for shortness of breath.    Cardiovascular: Negative for chest pain.   Gastrointestinal: Positive for abdominal pain and diarrhea. Negative for nausea and vomiting.   Genitourinary: Positive for dysuria. Negative for flank pain.   Musculoskeletal: Positive for back pain.   All other systems reviewed and are negative.    Physical Exam     Patient Vitals for  the past 24 hrs:   BP Temp Temp src Heart Rate Resp SpO2 Weight   07/18/18 0519 117/54 - - 97 - 97 % -   07/18/18 0320 147/69 97  F (36.1  C) Oral 75 25 100 % 132 kg (291 lb 0.1 oz)      Physical Exam  Constitutional:  Appears well-developed and well-nourished. Cooperative. Looks uncomfortable   HENT:   Head:    Atraumatic.   Mouth/Throat:   Oropharynx is without erythema or exudate and mucous       membranes are moist.   Eyes:    Conjunctivae normal and EOM are normal.      Pupils are equal, round, and reactive to light.   Neck:    Normal range of motion. Neck supple.   Cardiovascular:  Normal rate, regular rhythm, normal heart sounds and radial and      dorsalis pedis pulses are 2+ and symmetric.    Pulmonary/Chest:  Effort normal and breath sounds normal.   Abdominal:   Soft. Bowel sounds are normal. Palpable, non-tender uterus. Diffusely tender across    upper abdomen, right more than left.  No splenomegaly or hepatomegaly.nNo    rebound.   Musculoskeletal:  Normal range of motion. No edema and no tenderness.   Neurological:  Alert. Normal strength. No cranial nerve deficit. GCS 15.  Skin:    Skin is warm and dry.   Psychiatric:   Normal mood and affect.      Emergency Department Course     ECG:  ECG taken at 0340. ECG read at 0343.  NSR  Septal infarct, age undetermined  Abnormal ECG  Rate 85 bpm. WY interval 128 ms. QRS duration 82 ms. QT/QTc 384/456 ms. P-R-T axes 36 72 40.    Imaging:  Radiology findings were communicated with the patient who voiced understanding of the findings.    Abdomen US, limited (RUQ only)  1. Cholelithiasis without sonographic evidence of cholecystitis.  2. Fatty infiltration of the liver.  Reading per radiology    Laboratory:  Laboratory findings were communicated with the patient who voiced understanding of the findings.    UA: Ketones 10, Protein 30, Mucous present, Sperm present, calcium oxalate moderate    Interventions:  Zantac 150 mg PO Pending    Emergency Department  Course:    Nursing notes and vitals reviewed.    0345 I performed an exam of the patient as documented above.     0432 The patient provided a urine sample here in the emergency department. This was sent for laboratory testing, findings above.     0506 The patient was sent for a US while in the emergency department, results above.      I personally reviewed the lab and imaging results with the patient and answered all related questions prior to discharge.    Impression & Plan      Medical Decision Making:  Katia Rizvi is a 23 year old female who presents to the emergency department today for evaluation of symptoms consistent with gallstones and biliary colic.  Ultrasound has confirmed the presence of gallstones.  There is no clinical, laboratory, or ultrasound evidence of Choledocholithiasis, Gallstone Pancreatitis, or Ascending Cholangitis. The patient was instructed to take ibuprofen for pain. The patient was educated to avoid fatty foods.  The patient was told to return to ED for increasing pain, vomiting, chills, sweats, or fever.  Follow up with general surgery is indicated for outpatient consultation, this may be arranged as soon as able.  First, I will have her discuss symptoms with OB/GYN, and they will help guide her as well.    The patient also may be experiencing some symptoms of GERD.  Her pain improved fairly rapidly following a GI cocktail.  It is unclear if her pain is due to biliary colic versus GERD.  I will recommend treatments for both.  She has not had any hematemesis, black or bloody stools.  Hemoglobin is borderline low, but there are no orthostatic symptoms, and the patient denies any bleeding symptoms.    Of note, the patient is 24 weeks pregnant. She is not having lower abdominal symptoms, spotting, or bleeding. There is no lower abdominal tenderness. Bedside US showed good fetal movement and fetal cardiac activity.    Diagnosis:    ICD-10-CM    1. Gastroesophageal reflux disease,  esophagitis presence not specified K21.9    2. Calculus of gallbladder without cholecystitis without obstruction K80.20    3. 24 weeks gestation of pregnancy Z3A.24      Disposition:   Discharge    Discharge Medications:  Discharge Medication List as of 7/18/2018  6:54 AM      START taking these medications    Details   HYDROcodone-acetaminophen (NORCO) 5-325 MG per tablet Take 1 tablet by mouth every 6 hours as needed for severe pain, Disp-10 tablet, R-0, Local Print      ondansetron (ZOFRAN ODT) 4 MG ODT tab Take 1 tablet (4 mg) by mouth every 8 hours as needed for nausea, Disp-10 tablet, R-0, Local Print      ranitidine (ZANTAC) 150 MG tablet Take 1 tablet (150 mg) by mouth 2 times daily for 10 days, Disp-20 tablet, R-0, Local Print           Scribe Disclosure:  Catalino FORMAN, am serving as a scribe at 4:25 AM on 7/18/2018 to document services personally performed by Shahzad Snow MD based on my observations and the provider's statements to me.      EMERGENCY DEPARTMENT       Shahzad Snow MD  07/18/18 0743

## 2018-07-24 ENCOUNTER — MEDICAL CORRESPONDENCE (OUTPATIENT)
Dept: HEALTH INFORMATION MANAGEMENT | Facility: CLINIC | Age: 23
End: 2018-07-24

## 2018-07-24 ENCOUNTER — TRANSFERRED RECORDS (OUTPATIENT)
Dept: HEALTH INFORMATION MANAGEMENT | Facility: CLINIC | Age: 23
End: 2018-07-24

## 2018-07-24 LAB — LIPASE SERPL-CCNC: 160 U/L (ref 73–393)

## 2018-07-30 ENCOUNTER — HOSPITAL ENCOUNTER (OUTPATIENT)
Facility: CLINIC | Age: 23
Discharge: HOME | End: 2018-07-30
Attending: REGISTERED NURSE | Admitting: REGISTERED NURSE
Payer: COMMERCIAL

## 2018-07-30 ENCOUNTER — OFFICE VISIT (OUTPATIENT)
Dept: SURGERY | Facility: CLINIC | Age: 23
End: 2018-07-30
Payer: COMMERCIAL

## 2018-07-30 VITALS
WEIGHT: 291 LBS | BODY MASS INDEX: 53.55 KG/M2 | HEIGHT: 62 IN | DIASTOLIC BLOOD PRESSURE: 72 MMHG | HEART RATE: 98 BPM | SYSTOLIC BLOOD PRESSURE: 129 MMHG

## 2018-07-30 VITALS — SYSTOLIC BLOOD PRESSURE: 122 MMHG | TEMPERATURE: 98.4 F | DIASTOLIC BLOOD PRESSURE: 72 MMHG | RESPIRATION RATE: 20 BRPM

## 2018-07-30 DIAGNOSIS — K80.50 RECURRENT BILIARY COLIC: Primary | ICD-10-CM

## 2018-07-30 PROCEDURE — 40000809 ZZH STATISTIC NO DOCUMENTATION TO SUPPORT CHARGE

## 2018-07-30 PROCEDURE — 59025 FETAL NON-STRESS TEST: CPT

## 2018-07-30 PROCEDURE — G0463 HOSPITAL OUTPT CLINIC VISIT: HCPCS

## 2018-07-30 PROCEDURE — 99203 OFFICE O/P NEW LOW 30 MIN: CPT | Performed by: SURGERY

## 2018-07-30 RX ORDER — ONDANSETRON 2 MG/ML
4 INJECTION INTRAMUSCULAR; INTRAVENOUS EVERY 6 HOURS PRN
Status: DISCONTINUED | OUTPATIENT
Start: 2018-07-30 | End: 2018-07-30 | Stop reason: HOSPADM

## 2018-07-30 NOTE — PROGRESS NOTES
1300: Pt presents to [facility] for decreased fetal movement. Pt states she has not felt baby move as much this week as last week.  1305: Consent given to place external monitors. Positive fetal heart tones.  1310: Vital signs and medical history obtained.   1314: Dr. Hood updated on pt arrival and positive fetal heart tones. Telephone order to monitor pt for 20-30 minutes, and to discharge pt home if reassured. Will update Dr. Hood on pt status.  1345: Spotty capture throughout, and then loss of capture. Tracing appropriate for gestational age while able to trace. Pt states she feels baby move more since she's been here.   1352: Dr. Hood paged for update. Awaiting call back.  1425: Dr. Hood paged.  1441: Tisha ARIAS CNM, paged for further orders. Telephone order to discharge pt home with follow up as scheduled in the clinic. Pt agrees with plan of care and is discharged home.

## 2018-07-30 NOTE — IP AVS SNAPSHOT
[facility]    [address] [place] [address], [address]    [facility] [address]    Phone:  [phone]                                       After Visit Summary   7/30/2018    Katia Rizvi    MRN: [id number]           After Visit Summary Signature Page     I have received my discharge instructions, and my questions have been answered. I have discussed any challenges I see with this plan with the nurse or doctor.    ..........................................................................................................................................  Patient/Patient Representative Signature      ..........................................................................................................................................  Patient Representative Print Name and Relationship to Patient    ..................................................               ................................................  Date                                            Time    ..........................................................................................................................................  Reviewed by Signature/Title    ...................................................              ..............................................  Date                                                            Time

## 2018-07-30 NOTE — MR AVS SNAPSHOT
After Visit Summary   7/30/2018    Katia Rizvi    MRN: [id number]           Patient Information     Date Of Birth          1995        Visit Information        Provider Department      7/30/2018 11:30 AM Antonio Crawford MD [facility] General Surgery       Follow-ups after your visit        Your next 10 appointments already scheduled     Aug 13, 2018 11:00 AM CDT   MFM US COMPRE SINGLE F/U with SHMFMUSR3   [facility] - [facility])    [address] [place] [id number] [phone]           Wear comfortable clothes and leave your valuables at home.            Aug 13, 2018 11:30 AM CDT   Radiology MD with SH PATRICK LANDRY   [facility] Maternal Fetal Medicine - [facility])    [address] [place] [id number] [phone]           Please arrive at the time given for your first appointment. This visit is used internally to schedule the physician's time during your ultrasound.              Who to contact     If you have questions or need follow up information about today's clinic visit or your schedule please contact [facility] directly at [phone].  Normal or non-critical lab and imaging results will be communicated to you by Jetaporthart, letter or phone within 4 business days after the clinic has received the results. If you do not hear from us within 7 days, please contact the clinic through Livemapt or phone. If you have a critical or abnormal lab result, we will notify you by phone as soon as possible.  Submit refill requests through Narrato or call your pharmacy and they will forward the refill request to us. Please allow 3 business days for your refill to be completed.          Additional Information About Your Visit        Narrato Information     Narrato lets you send messages to your doctor, view your  "test results, renew your prescriptions, schedule appointments and more. To sign up, go to [email or web address] . Click on \"Log in\" on the left side of the screen, which will take you to the Welcome page. Then click on \"Sign up Now\" on the right side of the page.     You will be asked to enter the access code listed below, as well as some personal information. Please follow the directions to create your username and password.     Your access code is: [email or web address]  Expires: 2018  3:09 PM     Your access code will  in 90 days. If you need help or a new code, please call your [place] clinic or [phone].        Care EveryWhere ID     This is your Care EveryWhere ID. This could be used by other organizations to access your [facility] medical records  [id number]        Your Vitals Were     Pulse Height BMI (Body Mass Index)             98 5' 2\" (1.575 m) 53.22 kg/m2          Blood Pressure from Last 3 Encounters:   18 129/72   18 112/55   16 130/78    Weight from Last 3 Encounters:   18 291 lb (132 kg)   18 291 lb 0.1 oz (132 kg)   16 250 lb (113.4 kg)              Today, you had the following     No orders found for display         Today's Medication Changes          These changes are accurate as of 18 12:22 PM.  If you have any questions, ask your nurse or doctor.               Stop taking these medicines if you haven't already. Please contact your care team if you have questions.     carisoprodol 350 MG tablet   Commonly known as:  SOMA   Stopped by:  Antonio Crawford MD           cyclobenzaprine 10 MG tablet   Commonly known as:  FLEXERIL   Stopped by:  Antonio Crawford MD           EXCEDRIN MIGRAINE PO   Stopped by:  Antonio Crawford MD           HYDROcodone-acetaminophen 5-325 MG per tablet   Commonly known as:  NORCO   Stopped by:  Antonio Crawford MD           ibuprofen 800 MG tablet   Commonly known as:  ADVIL/MOTRIN   Stopped by:  Antonio Crawford MD           " methocarbamol 500 MG tablet   Commonly known as:  ROBAXIN   Stopped by:  Antonio Crawford MD           order for DME   Stopped by:  Antonio Crawford MD           topiramate 25 MG tablet   Commonly known as:  TOPAMAX   Stopped by:  Antonio Crawford MD                    Primary Care Provider Office Phone # Fax JOE Lorenz KAYDEN [phone]       [facility] O[facility]YN CONSULT [address] [facility] [address]  [facility] [address]        Equal Access to Services     [facility]: Hadii aad ku hadasho Soomaali, waaxda luqadaha, qaybta kaalmada adeegyada, waxay idiin hayaan adeeg jemima mackay . So [facility] [phone].    ATENCIÓN: Si habla español, tiene a kiran disposición servicios gratuitos de asistencia lingüística. [facility] [phone].    We comply with applicable federal civil rights laws and [place] laws. We do not discriminate on the basis of race, color, national origin, age, disability, sex, sexual orientation, or gender identity.            Thank you!     Thank you for choosing [facility]  for your care. Our goal is always to provide you with excellent care. Hearing back from our patients is one way we can continue to improve our services. Please take a few minutes to complete the written survey that you may receive in the mail after your visit with us. Thank you!             Your Updated Medication List - Protect others around you: Learn how to safely use, store and throw away your medicines at www.disposemymeds.org.          This list is accurate as of 7/30/18 12:22 PM.  Always use your most recent med list.                   Brand Name Dispense Instructions for use Diagnosis    PRENATAL 1 PO           RANITIDINE HCL PO           VITAMIN D (CHOLECALCIFEROL) PO      Take by mouth daily

## 2018-07-30 NOTE — PROGRESS NOTES
"[facility] General Surgery Clinic Consultation    CHIEF COMPLAINT:  Chief Complaint   Patient presents with     Consult     gallstones        HISTORY OF PRESENT ILLNESS:  Katia Rizvi is a 23 year old female who is seen in consultation at the request of Dr. Zepeda for evaluation of biliary colic/gallstones.  She is currently 26 weeks pregnant, right before this she needed a D&C at 9 weeks/miscarriage.  Since July 14 she has been experiencing at least two episodes of epigastric to right upper quadrant abdominal pain.  This accompanied by nausea and reflux.  Due to pain severity she went to the ED were she was found to have gallstones to explain her symptoms.      REVIEW OF SYSTEMS:  Constitutional:  Negative for chills, fatigue, fever and weight change.  Neuro: No extremity, nor facial weakness  Psych:  No unexpected changes in mood  Eyes:  Negative for new vision problems.  ENT:  Negative for ENT pain.  Cardiovascular:  Negative for chest pain, palpitations.  Respiratory:  Negative for cough, dyspnea.  Gastrointestinal:  See HPI  Musculoskeletal:  Negative for new arthralgias or myalgias.  Integumentary:  No new rashes nor lesions.    Past medical and surgical history reviewed.      Family History has been reviewed.    Social History   Substance Use Topics     Smoking status: Never Smoker     Smokeless tobacco: Never Used     Alcohol use Yes       Patient Active Problem List   Diagnosis     CARDIOVASCULAR SCREENING; LDL GOAL LESS THAN 130     Left low back pain     Obesity     Neck pain       No Known Allergies    Current Outpatient Prescriptions   Medication Sig Dispense Refill     Prenatal MV-Min-Fe Fum-FA-DHA (PRENATAL 1 PO)        RANITIDINE HCL PO        VITAMIN D, CHOLECALCIFEROL, PO Take by mouth daily         Vitals: /72  Pulse 98  Ht 5' 2\" (1.575 m)  Wt 291 lb (132 kg)  BMI 53.22 kg/m2  BMI= Body mass index is 53.22 kg/(m^2).    EXAM:  GENERAL: healthy, alert and no distress     HEENT: moist " mucus membranes, no scleral icterus   CARDIOVASCULAR:  RRR, No JVD  NEURO:  Alert;  well oriented to time, place and person.  RESPIRATORY: non labored breathing  NECK: Neck supple. No noticeable masses. No lymphadenopathy noted.  ABDOMEN/GI: soft, globose  EXTREMITIES: warm and well perfused, no edema  SKIN: No suspicious lesions or rashes    LABS/Imaging: unit(s)/s reviewed.    ASSESSMENT:  Katia Rizvi suffers from biliary colic due to cholelithiasis now on second trimester of pregancy.    PLAN:  She will consider her options of waiting versus laparoscopic cholecystectomy.     Katia Rizvi understands the risk, benefits, hopeful outcomes, and possible complications, both in the short and in the long term.  All her questions answered.    It is my pleasure to participate in the care of Katia Rizvi. Thank you for this consultation.     If you have any questions please give me a call.    Best regards,  Antonio Crawford MD    Please route or send letter to:  Primary Care Provider (PCP), Referring Provider and Include Progress Note    Total time with patient visit: 30 minutes more than half spent in counseling, explanation of procedures and coordination of care.

## 2018-07-30 NOTE — LETTER
2018    Re: Katia Rizvi, : 1995    HISTORY OF PRESENT ILLNESS:  Katia Rizvi is a 23 year old female who is seen in consultation at the request of Dr. Zepeda for evaluation of biliary colic/gallstones.  She is currently 26 weeks pregnant, right before this she needed a D&C at 9 weeks/miscarriage.  Since  she has been experiencing at least two episodes of epigastric to right upper quadrant abdominal pain.  This accompanied by nausea and reflux.  Due to pain severity she went to the ED were she was found to have gallstones to explain her symptoms.       REVIEW OF SYSTEMS:  Constitutional:  Negative for chills, fatigue, fever and weight change.  Neuro: No extremity, nor facial weakness  Psych:  No unexpected changes in mood  Eyes:  Negative for new vision problems.  ENT:  Negative for ENT pain.  Cardiovascular:  Negative for chest pain, palpitations.  Respiratory:  Negative for cough, dyspnea.  Gastrointestinal:  See HPI  Musculoskeletal:  Negative for new arthralgias or myalgias.  Integumentary:  No new rashes nor lesions.     Past medical and surgical history reviewed.     Family History has been reviewed.     EXAM:  GENERAL: healthy, alert and no distress      HEENT: moist mucus membranes, no scleral icterus   CARDIOVASCULAR:  RRR, No JVD  NEURO:  Alert;  well oriented to time, place and person.  RESPIRATORY: non labored breathing  NECK: Neck supple. No noticeable masses. No lymphadenopathy noted.  ABDOMEN/GI: soft, globose  EXTREMITIES: warm and well perfused, no edema  SKIN: No suspicious lesions or rashes     LABS/Imaging: unit(s)/s reviewed.     ASSESSMENT:  Katia Rizvi suffers from biliary colic due to cholelithiasis now on second trimester of pregancy.     PLAN:  She will consider her options of waiting versus laparoscopic cholecystectomy.      Katia Rizvi understands the risk, benefits, hopeful outcomes, and possible complications, both in the short and in the long term.   All her questions answered.     It is my pleasure to participate in the care of Katia Tiptonin. Thank you for this consultation.      If you have any questions please give me a call.     Best regards,  Antonio Crawford MD

## 2018-07-30 NOTE — DISCHARGE INSTRUCTIONS
Discharge Instruction for Undelivered Patients      You were seen for: Fetal Assessment  We Consulted: Dr. Hood  You had (Test or Medicine): fetal monitoring      Diet:   Drink 8 to 12 glasses of liquids (milk, juice, water) every day.  You may eat meals and snacks.     Activity:  Call your doctor or nurse midwife if your baby is moving less than usual.     Call your provider if you notice:  Swelling in your face or increased swelling in your hands or legs.  Headaches that are not relieved by Tylenol (acetaminophen).  Changes in your vision (blurring: seeing spots or stars.)  Nausea (sick to your stomach) and vomiting (throwing up).   Weight gain of 5 pounds or more per week.  Heartburn that doesn't go away.  Signs of bladder infection: pain when you urinate (use the toilet), need to go more often and more urgently.  The bag of hamilton (rupture of membranes) breaks, or you notice leaking in your underwear.  Bright red blood in your underwear.  Abdominal (lower belly) or stomach pain.  For first baby: Contractions (tightening) less than 5 minutes apart for one hour or more.  *If less than 34 weeks: Contractions (tightenings) more than 6 times in one hour.  Increase or change in vaginal discharge (note the color and amount)    Follow-up:  As scheduled in the clinic

## 2018-07-30 NOTE — IP AVS SNAPSHOT
MRN:[id number]                      After Visit Summary   7/30/2018    Katia Rizvi    MRN: [id number]           Thank you!     Thank you for choosing [facility] for your care. Our goal is always to provide you with excellent care. Hearing back from our patients is one way we can continue to improve our services. Please take a few minutes to complete the written survey that you may receive in the mail after you visit with us. Thank you!        Patient Information     Date Of Birth          1995        About your hospital stay     You were admitted on:  July 30, 2018 You last received care in the:  [facility]    You were discharged on:  July 30, 2018       Who to Call     For medical emergencies, please call 911.  For non-urgent questions about your medical care, please call your primary care provider or clinic, [phone]          Attending Provider     Provider Specialty    Lisette Zepeda APRN [facility]       Primary Care Provider Office Phone # Fax #    JOE Goins [phone]      Your next 10 appointments already scheduled     Aug 13, 2018 11:00 AM CDT   PATRICK US COMPRE SINGLE F/U with [id number]   [facility] - [facility])    [address] [place] [id number] [phone]           Wear comfortable clothes and leave your valuables at home.            Aug 13, 2018 11:30 AM CDT   Radiology MD with  PATRICK LANDRY   [facility] - [facility])    [address] [place] [id number] [phone]           Please arrive at the time given for your first appointment. This visit is used internally to schedule the physician's time during your ultrasound.              Further instructions from your care team       Discharge Instruction for Undelivered Patients      You were seen for: Fetal  "Assessment  We Consulted: Dr. Hood  You had (Test or Medicine): fetal monitoring      Diet:   Drink 8 to 12 glasses of liquids (milk, juice, water) every day.  You may eat meals and snacks.     Activity:  Call your doctor or nurse midwife if your baby is moving less than usual.     Call your provider if you notice:  Swelling in your face or increased swelling in your hands or legs.  Headaches that are not relieved by Tylenol (acetaminophen).  Changes in your vision (blurring: seeing spots or stars.)  Nausea (sick to your stomach) and vomiting (throwing up).   Weight gain of 5 pounds or more per week.  Heartburn that doesn't go away.  Signs of bladder infection: pain when you urinate (use the toilet), need to go more often and more urgently.  The bag of hamilton (rupture of membranes) breaks, or you notice leaking in your underwear.  Bright red blood in your underwear.  Abdominal (lower belly) or stomach pain.  For first baby: Contractions (tightening) less than 5 minutes apart for one hour or more.  *If less than 34 weeks: Contractions (tightenings) more than 6 times in one hour.  Increase or change in vaginal discharge (note the color and amount)    Follow-up:  As scheduled in the clinic          Pending Results     No orders found from 7/28/2018 to 7/31/2018.            Admission Information     Date & Time Provider Department Dept. Phone    7/30/2018 Lisette Zepeda APRN [facility] [phone]      Your Vitals Were     Blood Pressure Temperature Respirations             122/72 98.4  F (36.9  C) (Temporal) 20         MyChart Information     doxo lets you send messages to your doctor, view your test results, renew your prescriptions, schedule appointments and more. To sign up, go to [email or web address] . Click on \"Log in\" on the left side of the screen, which will take you to the Welcome page. Then click on \"Sign up Now\" on the right side of the page.     You will be asked to enter " the access code listed below, as well as some personal information. Please follow the directions to create your username and password.     Your access code is: [email or web address]  Expires: 2018  3:09 PM     Your access code will  in 90 days. If you need help or a new code, please call your [facility] clinic or [phone].        Care EveryWhere ID     This is your Care EveryWhere ID. This could be used by other organizations to access your [facility] medical records  [id number]        Equal Access to Services     [facility]: Hadnael juárez Sojohn, waaxda luqadaha, qaybta kaalmagatiot do, james mackay . So [facility] [phone].    ATENCIÓN: Si habla español, tiene a kiran disposición servicios gratuitos de asistencia lingüística. Llame al [phone].    We comply with applicable federal civil rights laws and [place] laws. We do not discriminate on the basis of race, color, national origin, age, disability, sex, sexual orientation, or gender identity.               Review of your medicines      UNREVIEWED medicines. Ask your doctor about these medicines        Dose / Directions    PRENATAL 1 PO        Refills:  0       RANITIDINE HCL PO        Refills:  0       VITAMIN D (CHOLECALCIFEROL) PO        Take by mouth daily   Refills:  0                Protect others around you: Learn how to safely use, store and throw away your medicines at www.disposemymeds.org.             Medication List: This is a list of all your medications and when to take them. Check marks below indicate your daily home schedule. Keep this list as a reference.      Medications           Morning Afternoon Evening Bedtime As Needed    PRENATAL 1 PO                                RANITIDINE HCL PO                                VITAMIN D (CHOLECALCIFEROL) PO   Take by mouth daily

## 2018-08-13 ENCOUNTER — OFFICE VISIT (OUTPATIENT)
Dept: MATERNAL FETAL MEDICINE | Facility: CLINIC | Age: 23
End: 2018-08-13
Attending: REGISTERED NURSE
Payer: COMMERCIAL

## 2018-08-13 ENCOUNTER — HOSPITAL ENCOUNTER (OUTPATIENT)
Dept: ULTRASOUND IMAGING | Facility: CLINIC | Age: 23
Discharge: HOME | End: 2018-08-13
Attending: REGISTERED NURSE | Admitting: REGISTERED NURSE
Payer: COMMERCIAL

## 2018-08-13 DIAGNOSIS — O26.90 PREGNANCY RELATED CONDITION, ANTEPARTUM: ICD-10-CM

## 2018-08-13 DIAGNOSIS — O99.210 MATERNAL OBESITY AFFECTING PREGNANCY, ANTEPARTUM: Primary | ICD-10-CM

## 2018-08-13 PROCEDURE — 76816 OB US FOLLOW-UP PER FETUS: CPT

## 2018-08-13 NOTE — PROGRESS NOTES
"Please see \"Imaging\" tab under Chart Review for full details.    Allyssa Lala MD  Maternal Fetal Medicine    "

## 2018-08-13 NOTE — MR AVS SNAPSHOT
After Visit Summary   8/13/2018    Katia Rizvi    MRN: [id number]           Patient Information     Date Of Birth          1995        Visit Information        Provider Department      8/13/2018 11:30 AM Allyssa Lala MD [facility] Maternal Fetal Medicine [facility]        Today's Diagnoses     Maternal obesity affecting pregnancy, antepartum    -  1       Follow-ups after your visit        Your next 10 appointments already scheduled     Sep 24, 2018  1:30 PM CDT   MFM US COMPRE SINGLE F/U with [id number]   [facility] Maternal Fetal Medicine Ultrasound - [facility])    [address] [place] [address]  [address]  [place] [id number] [phone]           Wear comfortable clothes and leave your valuables at home.            Sep 24, 2018  2:00 PM CDT   Radiology MD with SH PATRICK LANDRY   [facility] Maternal Fetal Medicine [facility] ([facility])    [address] [place] [address]  [place] [id number] [phone]           Please arrive at the time given for your first appointment. This visit is used internally to schedule the physician's time during your ultrasound.              Future tests that were ordered for you today     Open Future Orders        Priority Expected Expires Ordered    MFM US Comprehensive Single F/U Routine  8/13/2019 8/13/2018            Who to contact     If you have questions or need follow up information about today's clinic visit or your schedule please contact [facility]ER[facility] FETAL [facility]IC[facility] directly at [phone].  Normal or non-critical lab and imaging results will be communicated to you by MyChart, letter or phone within 4 business days after the clinic has received the results. If you do not hear from us within 7 days, please contact the clinic through MyChart or phone. If you have a critical or abnormal lab result, we will notify you by phone as soon as possible.  Submit refill requests through [email or web address] or call your  "pharmacy and they will forward the refill request to us. Please allow 3 business days for your refill to be completed.          Additional Information About Your Visit        MyChart Information     [employer] lets you send messages to your doctor, view your test results, renew your prescriptions, schedule appointments and more. To sign up, go to [email or web address] . Click on \"Log in\" on the left side of the screen, which will take you to the Welcome page. Then click on \"Sign up Now\" on the right side of the page.     You will be asked to enter the access code listed below, as well as some personal information. Please follow the directions to create your username and password.     Your access code is: [email or web address]  Expires: 2018  3:09 PM     Your access code will  in 90 days. If you need help or a new code, please call your [facility] clinic or [phone].        Care EveryWhere ID     This is your Care EveryWhere ID. This could be used by other organizations to access your [facility] medical records  [id number]         Blood Pressure from Last 3 Encounters:   18 122/72   18 129/72   18 112/55    Weight from Last 3 Encounters:   18 132 kg (291 lb)   18 132 kg (291 lb 0.1 oz)   16 113.4 kg (250 lb)               Primary Care Provider Office Phone # Fax #    Lisette JOE Portillo [facility] [phone]       [facility] [address] [address]        Equal Access to Services     [facility] : Hadii eddie william hadasho Sojohn, waaxda luqadaha, qaybta kaalmada hi, james murpyh. So [facility] [phone].    ATENCIÓN: Si habla español, tiene a kiran disposición servicios gratuitos de asistencia lingüística. Hannah al [phone].    We comply with applicable federal civil rights laws and [place] laws. We do not discriminate on the basis of race, color, national origin, age, disability, sex, sexual " orientation, or gender identity.            Thank you!     Thank you for choosing [facility]  for your care. Our goal is always to provide you with excellent care. Hearing back from our patients is one way we can continue to improve our services. Please take a few minutes to complete the written survey that you may receive in the mail after your visit with us. Thank you!             Your Updated Medication List - Protect others around you: Learn how to safely use, store and throw away your medicines at www.disposemymeds.org.          This list is accurate as of 8/13/18 11:57 AM.  Always use your most recent med list.                   Brand Name Dispense Instructions for use Diagnosis    PRENATAL 1 PO           RANITIDINE HCL PO           VITAMIN D (CHOLECALCIFEROL) PO      Take by mouth daily

## 2018-09-04 ENCOUNTER — APPOINTMENT (OUTPATIENT)
Dept: ULTRASOUND IMAGING | Facility: CLINIC | Age: 23
End: 2018-09-04
Attending: EMERGENCY MEDICINE
Payer: COMMERCIAL

## 2018-09-04 ENCOUNTER — HOSPITAL ENCOUNTER (OUTPATIENT)
Facility: CLINIC | Age: 23
Discharge: HOME | End: 2018-09-04
Attending: REGISTERED NURSE | Admitting: REGISTERED NURSE
Payer: COMMERCIAL

## 2018-09-04 ENCOUNTER — HOSPITAL ENCOUNTER (EMERGENCY)
Facility: CLINIC | Age: 23
Discharge: HOME | End: 2018-09-04
Attending: EMERGENCY MEDICINE | Admitting: EMERGENCY MEDICINE
Payer: COMMERCIAL

## 2018-09-04 VITALS
DIASTOLIC BLOOD PRESSURE: 60 MMHG | SYSTOLIC BLOOD PRESSURE: 106 MMHG | TEMPERATURE: 98.7 F | OXYGEN SATURATION: 97 % | HEIGHT: 62 IN

## 2018-09-04 VITALS — TEMPERATURE: 97.2 F | DIASTOLIC BLOOD PRESSURE: 64 MMHG | SYSTOLIC BLOOD PRESSURE: 126 MMHG

## 2018-09-04 DIAGNOSIS — K80.50 BILIARY COLIC: ICD-10-CM

## 2018-09-04 DIAGNOSIS — K80.20 CALCULUS OF GALLBLADDER WITHOUT CHOLECYSTITIS WITHOUT OBSTRUCTION: ICD-10-CM

## 2018-09-04 PROBLEM — K82.9 GALLBLADDER ATTACK: Status: ACTIVE | Noted: 2018-09-04

## 2018-09-04 LAB
ALBUMIN SERPL-MCNC: 2.7 G/DL (ref 3.4–5)
ALBUMIN UR-MCNC: 30 MG/DL
ALP SERPL-CCNC: 73 U/L (ref 40–150)
ALT SERPL W P-5'-P-CCNC: 14 U/L (ref 0–50)
ANION GAP SERPL CALCULATED.3IONS-SCNC: 10 MMOL/L (ref 3–14)
APPEARANCE UR: ABNORMAL
AST SERPL W P-5'-P-CCNC: 15 U/L (ref 0–45)
BACTERIA #/AREA URNS HPF: ABNORMAL /HPF
BASOPHILS # BLD AUTO: 0 10E9/L (ref 0–0.2)
BASOPHILS NFR BLD AUTO: 0.2 %
BILIRUB SERPL-MCNC: 0.2 MG/DL (ref 0.2–1.3)
BILIRUB UR QL STRIP: NEGATIVE
BUN SERPL-MCNC: 8 MG/DL (ref 7–30)
CALCIUM SERPL-MCNC: 8.2 MG/DL (ref 8.5–10.1)
CAOX CRY #/AREA URNS HPF: ABNORMAL /HPF
CHLORIDE SERPL-SCNC: 110 MMOL/L (ref 94–109)
CO2 SERPL-SCNC: 22 MMOL/L (ref 20–32)
COLOR UR AUTO: YELLOW
CREAT SERPL-MCNC: 0.59 MG/DL (ref 0.52–1.04)
DIFFERENTIAL METHOD BLD: ABNORMAL
EOSINOPHIL # BLD AUTO: 0.2 10E9/L (ref 0–0.7)
EOSINOPHIL NFR BLD AUTO: 1.6 %
ERYTHROCYTE [DISTWIDTH] IN BLOOD BY AUTOMATED COUNT: 14.3 % (ref 10–15)
GFR SERPL CREATININE-BSD FRML MDRD: >90 ML/MIN/1.7M2
GLUCOSE SERPL-MCNC: 106 MG/DL (ref 70–99)
GLUCOSE UR STRIP-MCNC: NEGATIVE MG/DL
HCT VFR BLD AUTO: 30.7 % (ref 35–47)
HGB BLD-MCNC: 10.2 G/DL (ref 11.7–15.7)
HGB UR QL STRIP: NEGATIVE
IMM GRANULOCYTES # BLD: 0.1 10E9/L (ref 0–0.4)
IMM GRANULOCYTES NFR BLD: 0.8 %
KETONES UR STRIP-MCNC: NEGATIVE MG/DL
LEUKOCYTE ESTERASE UR QL STRIP: ABNORMAL
LIPASE SERPL-CCNC: 183 U/L (ref 73–393)
LYMPHOCYTES # BLD AUTO: 3 10E9/L (ref 0.8–5.3)
LYMPHOCYTES NFR BLD AUTO: 23 %
MCH RBC QN AUTO: 29.9 PG (ref 26.5–33)
MCHC RBC AUTO-ENTMCNC: 33.2 G/DL (ref 31.5–36.5)
MCV RBC AUTO: 90 FL (ref 78–100)
MONOCYTES # BLD AUTO: 0.9 10E9/L (ref 0–1.3)
MONOCYTES NFR BLD AUTO: 6.5 %
MUCOUS THREADS #/AREA URNS LPF: PRESENT /LPF
NEUTROPHILS # BLD AUTO: 9 10E9/L (ref 1.6–8.3)
NEUTROPHILS NFR BLD AUTO: 67.9 %
NITRATE UR QL: NEGATIVE
NRBC # BLD AUTO: 0 10*3/UL
NRBC BLD AUTO-RTO: 0 /100
PH UR STRIP: 6 PH (ref 5–7)
PLATELET # BLD AUTO: 240 10E9/L (ref 150–450)
POTASSIUM SERPL-SCNC: 3.9 MMOL/L (ref 3.4–5.3)
PROT SERPL-MCNC: 6.3 G/DL (ref 6.8–8.8)
RBC # BLD AUTO: 3.41 10E12/L (ref 3.8–5.2)
RBC #/AREA URNS AUTO: 6 /HPF (ref 0–2)
SODIUM SERPL-SCNC: 142 MMOL/L (ref 133–144)
SOURCE: ABNORMAL
SP GR UR STRIP: 1.03 (ref 1–1.03)
SQUAMOUS #/AREA URNS AUTO: 13 /HPF (ref 0–1)
UROBILINOGEN UR STRIP-MCNC: 2 MG/DL (ref 0–2)
WBC # BLD AUTO: 13.2 10E9/L (ref 4–11)
WBC #/AREA URNS AUTO: 19 /HPF (ref 0–5)

## 2018-09-04 PROCEDURE — 99285 EMERGENCY DEPT VISIT HI MDM: CPT | Mod: 25

## 2018-09-04 PROCEDURE — 85025 COMPLETE CBC W/AUTO DIFF WBC: CPT | Performed by: EMERGENCY MEDICINE

## 2018-09-04 PROCEDURE — 96375 TX/PRO/DX INJ NEW DRUG ADDON: CPT

## 2018-09-04 PROCEDURE — 96361 HYDRATE IV INFUSION ADD-ON: CPT

## 2018-09-04 PROCEDURE — G0463 HOSPITAL OUTPT CLINIC VISIT: HCPCS | Mod: 25

## 2018-09-04 PROCEDURE — 25000128 H RX IP 250 OP 636: Performed by: EMERGENCY MEDICINE

## 2018-09-04 PROCEDURE — 25000125 ZZHC RX 250: Performed by: EMERGENCY MEDICINE

## 2018-09-04 PROCEDURE — 59025 FETAL NON-STRESS TEST: CPT

## 2018-09-04 PROCEDURE — 96374 THER/PROPH/DIAG INJ IV PUSH: CPT

## 2018-09-04 PROCEDURE — 87086 URINE CULTURE/COLONY COUNT: CPT | Performed by: EMERGENCY MEDICINE

## 2018-09-04 PROCEDURE — 80053 COMPREHEN METABOLIC PANEL: CPT | Performed by: EMERGENCY MEDICINE

## 2018-09-04 PROCEDURE — 83690 ASSAY OF LIPASE: CPT | Performed by: EMERGENCY MEDICINE

## 2018-09-04 PROCEDURE — 76705 ECHO EXAM OF ABDOMEN: CPT

## 2018-09-04 PROCEDURE — 81001 URINALYSIS AUTO W/SCOPE: CPT | Performed by: EMERGENCY MEDICINE

## 2018-09-04 RX ORDER — ACETAMINOPHEN 160 MG
2000 TABLET,DISINTEGRATING ORAL DAILY
Status: ON HOLD | COMMUNITY
Start: 2018-04-27 | End: 2019-06-19

## 2018-09-04 RX ORDER — MORPHINE SULFATE 4 MG/ML
4 INJECTION, SOLUTION INTRAMUSCULAR; INTRAVENOUS
Status: COMPLETED | OUTPATIENT
Start: 2018-09-04 | End: 2018-09-04

## 2018-09-04 RX ORDER — METOCLOPRAMIDE HYDROCHLORIDE 5 MG/ML
10 INJECTION INTRAMUSCULAR; INTRAVENOUS ONCE
Status: COMPLETED | OUTPATIENT
Start: 2018-09-04 | End: 2018-09-04

## 2018-09-04 RX ORDER — METOCLOPRAMIDE 5 MG/1
5 TABLET ORAL
Refills: 0 | COMMUNITY
Start: 2018-05-25 | End: 2018-09-04

## 2018-09-04 RX ORDER — METOCLOPRAMIDE 10 MG/1
10 TABLET ORAL 4 TIMES DAILY PRN
Qty: 20 TABLET | Refills: 1 | Status: ON HOLD | OUTPATIENT
Start: 2018-09-04 | End: 2018-09-15

## 2018-09-04 RX ORDER — ONDANSETRON 2 MG/ML
4 INJECTION INTRAMUSCULAR; INTRAVENOUS EVERY 6 HOURS PRN
Status: DISCONTINUED | OUTPATIENT
Start: 2018-09-04 | End: 2018-09-04 | Stop reason: HOSPADM

## 2018-09-04 RX ORDER — SODIUM CHLORIDE 9 MG/ML
1000 INJECTION, SOLUTION INTRAVENOUS CONTINUOUS
Status: DISCONTINUED | OUTPATIENT
Start: 2018-09-04 | End: 2018-09-04 | Stop reason: HOSPADM

## 2018-09-04 RX ADMIN — MORPHINE SULFATE 4 MG: 4 INJECTION INTRAVENOUS at 05:01

## 2018-09-04 RX ADMIN — SODIUM CHLORIDE 1000 ML: 9 INJECTION, SOLUTION INTRAVENOUS at 04:59

## 2018-09-04 RX ADMIN — METOCLOPRAMIDE 10 MG: 5 INJECTION, SOLUTION INTRAMUSCULAR; INTRAVENOUS at 04:57

## 2018-09-04 RX ADMIN — FAMOTIDINE 20 MG: 10 INJECTION INTRAVENOUS at 04:58

## 2018-09-04 ASSESSMENT — ENCOUNTER SYMPTOMS
ABDOMINAL PAIN: 1
FEVER: 0
DYSURIA: 0
BACK PAIN: 1
VOMITING: 1
NAUSEA: 1

## 2018-09-04 NOTE — DISCHARGE INSTRUCTIONS
Gallstones with Biliary Colic    You have abdominal pain due to irritation and spasm of the gallbladder. This is called biliary colic. The gallbladder is a small sac under the liver, which stores and releases a fluid that aids in the digestion of fat. A collection of crystals may form stones inside the gallbladder (gallstones). Gallstones can cause the gallbladder to spasm. If they block the duct out of the gallbladder, they can cause pain and even an infection.   A number of factors increase the risk for having gallstones:    Being female    Being severely overweight (obese)    Older age    Losing or gaining weight quickly    Eating a high-calorie diet    Being pregnant    Taking hormone therapy    Having diabetes  Home care    Rest in bed.    Drink only clear liquids until you feel better.    You may have been prescribed medicine for pain or nausea. Take these as directed.    Fat in your diet makes the gallbladder contract and may cause increased pain. Avoid foods that are high in fat (such as full-fat dairy, fried foods, and fatty meats) for at least two days.    If you are overweight, talk to your healthcare provider about losing weight.  Follow-up care  Follow up with your healthcare provider or as advised. There is a chance that you will have another episode of pain from your gallstones at some point. Removal of the gallbladder is an option to prevent this. Talk with your healthcare provider about your treatment options.  When to seek medical advice  Call your healthcare provider if any of the following occur:    Worsening pain or pain lasting for longer than 6 hours    Pain moving to the right lower abdomen    Repeated vomiting    Swollen abdomen    Fever of 100.4 F (38 C) or higher, or as directed by your healthcare provider    Very dark urine, light colored stools, or yellow color of the skin or eyes    Chest, arm, back, neck or jaw pain  Date Last Reviewed: 6/18/2015 2000-2017 [employer],  [employer]. [address] [place] [id number]. All rights reserved. This information is not intended as a substitute for professional medical care. Always follow your healthcare professional's instructions.          Abdominal Pain in Pregnancy, Gallstones  You have pain in your upper belly (abdomen) that may be caused by gallstones. The gallbladder is an organ that stores fluid called bile. The gallbladder sends bile into the intestines to help you digest your food. A small amount of bile sometimes stays in the gallbladder. In time, this bile can harden, forming gallstones. If stones move into the tube (duct) that carries bile out of the gallbladder, they can cause pain or infection.    Gallstones are more likely to occur during pregnancy. This is because you have higher amounts of hormones at this time. Other things that make it more likely to have gallstones are family history and a diet that s high in fatty foods.  One common symptom is pain and cramping in your belly, usually after you eat. The pain is in the upper right part of your belly. These are other common symptoms of gallstones:    Nausea and vomiting    Loss of appetite    Itching without a rash    Jaundice    Pale-color stool    Dark urine    Fever  You and your healthcare provider will decide on the best treatment for you. Here s what you can do to ease your discomfort in the meantime.  Home care  Medicines  Your healthcare provider may prescribe medicine to help relieve pain. Follow your healthcare provider s instructions for taking these medicines.  General care  Although you can t control your family history or your hormones, you can make changes to your diet. Changing your diet won't fix your gallbladder, but it may help with the symptoms. Here are some general care guidelines:    Eat a diet that s high in fiber and low in fat. Avoid greasy or fried foods.    Read food labels to be sure the foods you are choosing are low in fat.    Limit high-fat  meats, dairy products, animal fats, and vegetable oils.    Keep all appointments with your healthcare provider. Your provider needs to watch your condition.  Surgery for gallstones is generally not done during pregnancy unless the gallstones are causing severe pain or you have an infection. Discuss your treatment choices with your provider. You might need:    Medicine to dissolve the stones    A procedure called an ERCP to find the stones and remove them. The ERCP uses a thin tube with video and X-rays.    Surgery to remove the gallstones  Even after treatment, gallstones can return.  Follow-up care  Follow up with your healthcare provider, or as advised.  Call 911  Call 911 if any of these occur:    Severe lightheadedness, passing out, or fainting    Rapid heart rate    Trouble breathing    Confusion or difficulty waking up  When to seek medical advice  Call your healthcare provider right away if any of these occur:    Fever of 100.4 F (38 C) or higher, or as directed by your healthcare provider    Severe pain in the upper belly, shoulder, or back    Nausea or vomiting    Yellowing of the skin or eyes (jaundice)    Vaginal bleeding, leakage of fluid from the vagina, or lack of fetal movement  Date Last Reviewed: 6/1/2016 2000-2017 [employer]. [address] [place] [address], [place] [address]. All rights reserved. This information is not intended as a substitute for professional medical care. Always follow your healthcare professional's instructions.

## 2018-09-04 NOTE — ED PROVIDER NOTES
"  History     Chief Complaint:  Abdominal Pain      HPI   Katia Rizvi is a 23 year old female, 31 weeks gravid (A1), with a history of gallstones who presents to the ED for evaluation of abdominal pain. The patient reports that early this morning, she developed on the onset of upper abdominal pain. This now radiates to her back and is associated with nausea and vomiting. Upon arrival to the ED, the patient was evaluated and cleared by Dr. Zepeda, OB/GYN. Here, the patient has continued complaints of abdominal pain and experienced one episode of vomiting. She denies any dysuria or fevers. Of note, she reports having a burger and salad for dinner last night, along with a pop that she states is unusual for her.    Allergies:  NKDA    Medications:    Reglan  Prenatal vitamins  Ranitidine    Past Medical History:    Gallstones  Obesity  Gallbladder attack    Past Surgical History:    D&C    Family History:    Cerebrovascular disease    Social History:  Negative for tobacco use.  Alcohol use: yes  Marital Status:  Single [1]    Review of Systems   Constitutional: Negative for fever.   Gastrointestinal: Positive for abdominal pain, nausea and vomiting.   Genitourinary: Negative for dysuria.   Musculoskeletal: Positive for back pain.   All other systems reviewed and are negative.      Physical Exam     Patient Vitals for the past 24 hrs:   BP Temp Temp src Heart Rate SpO2 Height   18 0446 112/69 98.7  F (37.1  C) Oral 85 97 % 1.575 m (5' 2\")      Physical Exam  General: Alert, interactive in mild distress  Head:  Scalp is atraumatic  Eyes:  The pupils are equal, round, and reactive to light    EOM's intact    No scleral icterus  ENT:      Nose:  The external nose is normal  Ears:  External ears are normal  Mouth/Throat: The oropharynx is normal    Mucus membranes are dry      Neck:  Normal range of motion.      There is no rigidity.    Trachea is in the midline         CV:  Regular rate and rhythm    No murmur "   Resp:  Breath sounds are clear bilaterally    Non-labored, no retractions or accessory muscle use      GI:  Abdomen is soft.    Right upper quadrant tenderness.    Abdomen is gravid.      MS:  Normal strength in all 4 extremities  Skin:  Warm and dry, No rash or lesions noted.  Neuro: Strength 5/5 x4.       Psych:  Awake. Alert.  Normal affect.      Appropriate interactions.    Emergency Department Course   Imaging:  Radiographic findings were communicated with the patient who voiced understanding of the findings.  US Abdomen, limited:  1. Several tiny gallstones within a mildly distended gallbladder.  Additionally, the ultrasound technologist reports that the patient has  focal tenderness over the gallbladder. These findings are not  diagnostic of acute cholecystitis, but it is possible. If there is  clinical concern for acute cholecystitis, a HIDA scan could be  considered for further evaluation.  2. No biliary dilatation.  3. Mild to moderate diffuse fatty infiltration of the liver, as per radiology.     Laboratory:  CBC: WBC: 13.2 (H), HGB: 10.2 (L), PLT: 240  CMP: Glucose 106 (H), Chloride 110 (H), Calcium 8.2 (L), Albumin 2.7 (L), Protein total 6.3 (L), o/w WNL (Creatinine: 0.59)    Lipase: 183    UA with Microscopic: Leukocyte esterase large (A), Protein albumin 30 (A), o/w WNL     Interventions:  0457 Reglan 10 mg  0458 Pepcid 20 mg IV  0459 NS 1L IV   0501 Morphine 4 mg IV    Emergency Department Course:  Nursing notes and vitals reviewed. (0450) I performed an exam of the patient as documented above.     IV inserted. Medicine administered as documented above. Blood drawn. This was sent to the lab for further testing, results above.    The patient provided a urine sample here in the emergency department. This was sent for laboratory testing, findings above.      The patient was sent for an abdominal US while in the emergency department, findings above.     (9120) I rechecked the patient and discussed the  results of her workup thus far.     Findings and plan explained to the Patient. Patient discharged home with instructions regarding supportive care, medications, and reasons to return. The importance of close follow-up was reviewed. The patient was prescribed Reglan    I personally reviewed the laboratory results with the Patient and answered all related questions prior to discharge.     Impression & Plan    Medical Decision Making:  Following presentation history and physical examination were performed.  Previous records are reviewed demonstrating a history of cholelithiasis.  The above workup was undertaken demonstrating cholelithiasis however no significant signs of cholecystitis, choledocholithiasis, or cholangitis.  After the medications above the patient's symptoms significantly improved and she is requesting water.  I had a lengthy discussion with the patient about treatment options and she feels most comfortable with discharge to home, she is already followed up with a general surgeon and will see them again to discuss cholecystectomy.  The patient was in agreement this plan was subsequently discharged home and will return if new symptoms develop.  I have prescribed Reglan for her nausea and I recommended a bland diet, and Tylenol for pain.    Diagnosis:    ICD-10-CM    1. Biliary colic K80.50 UA reflex to Microscopic and Culture   2. Calculus of gallbladder without cholecystitis without obstruction K80.20        Disposition:  discharged to home    Discharge Medications:  New Prescriptions    METOCLOPRAMIDE (REGLAN) 10 MG TABLET    Take 1 tablet (10 mg) by mouth 4 times daily as needed       Scribe Disclosure:  I, Raisa Paredes, am serving as a scribe on 9/4/2018 at 4:59 AM to personally document services performed by Teodoro Maharaj,* based on my observations and the provider's statements to me.      Raisa Paredes  9/4/2018    EMERGENCY DEPARTMENT       Teodoro Maharaj MD  09/04/18  0932

## 2018-09-04 NOTE — PLAN OF CARE
Pt was brought from ED to [facility] at 31.1 weeks gestation for c/o gallbladder pain. Pt has a history of a gallbladder attack in July. Pt denies any lof, vaginal bleeding, or contractions. Positive fetal movement. External monitors placed on pt and NST obtained. CHANI Zepeda called and notified of pt arrival and updated on pt status. Reactive NST with no contractions noted. Pt to be discharged to ED for care.

## 2018-09-04 NOTE — ED AVS SNAPSHOT
Emergency Department    [address] [facility] [address]    Phone:  [phone]    Fax:  [phone]                                       Katia Rizvi   MRN: [id number]    Department:   Emergency Department   Date of Visit:  9/4/2018           Patient Information     Date Of Birth          1995        Your diagnoses for this visit were:     Biliary colic     Calculus of gallbladder without cholecystitis without obstruction        You were seen by Teodoro Maharaj MD.      Follow-up Information     Schedule an appointment as soon as possible for a visit with Lisette Zepeda APRN CNM.    Specialty:  Midwives    Contact information:    [facility]  [address]  [facility] [address] [phone]          Schedule an appointment as soon as possible for a visit with Antonio Crwaford MD.    Specialty:  Surgery    Contact information:    [address]  [facility] [address] [phone]          Follow up with  Emergency Department.    Specialty:  EMERGENCY MEDICINE    Why:  As needed, If symptoms worsen    Contact information:    [address] [facility] [id number] [phone]        Discharge Instructions         Gallstones with Biliary Colic    You have abdominal pain due to irritation and spasm of the gallbladder. This is called biliary colic. The gallbladder is a small sac under the liver, which stores and releases a fluid that aids in the digestion of fat. A collection of crystals may form stones inside the gallbladder (gallstones). Gallstones can cause the gallbladder to spasm. If they block the duct out of the gallbladder, they can cause pain and even an infection.   A number of factors increase the risk for having gallstones:    Being female    Being severely overweight (obese)    Older age    Losing or gaining weight quickly    Eating a high-calorie diet    Being pregnant    Taking hormone therapy    Having diabetes  Home  care    Rest in bed.    Drink only clear liquids until you feel better.    You may have been prescribed medicine for pain or nausea. Take these as directed.    Fat in your diet makes the gallbladder contract and may cause increased pain. Avoid foods that are high in fat (such as full-fat dairy, fried foods, and fatty meats) for at least two days.    If you are overweight, talk to your healthcare provider about losing weight.  Follow-up care  Follow up with your healthcare provider or as advised. There is a chance that you will have another episode of pain from your gallstones at some point. Removal of the gallbladder is an option to prevent this. Talk with your healthcare provider about your treatment options.  When to seek medical advice  Call your healthcare provider if any of the following occur:    Worsening pain or pain lasting for longer than 6 hours    Pain moving to the right lower abdomen    Repeated vomiting    Swollen abdomen    Fever of 100.4 F (38 C) or higher, or as directed by your healthcare provider    Very dark urine, light colored stools, or yellow color of the skin or eyes    Chest, arm, back, neck or jaw pain  Date Last Reviewed: 6/18/2015 2000-2017 [employer]. [address] [place] [address]. All rights reserved. This information is not intended as a substitute for professional medical care. Always follow your healthcare professional's instructions.          Abdominal Pain in Pregnancy, Gallstones  You have pain in your upper belly (abdomen) that may be caused by gallstones. The gallbladder is an organ that stores fluid called bile. The gallbladder sends bile into the intestines to help you digest your food. A small amount of bile sometimes stays in the gallbladder. In time, this bile can harden, forming gallstones. If stones move into the tube (duct) that carries bile out of the gallbladder, they can cause pain or infection.    Gallstones are more likely to occur  during pregnancy. This is because you have higher amounts of hormones at this time. Other things that make it more likely to have gallstones are family history and a diet that s high in fatty foods.  One common symptom is pain and cramping in your belly, usually after you eat. The pain is in the upper right part of your belly. These are other common symptoms of gallstones:    Nausea and vomiting    Loss of appetite    Itching without a rash    Jaundice    Pale-color stool    Dark urine    Fever  You and your healthcare provider will decide on the best treatment for you. Here s what you can do to ease your discomfort in the meantime.  Home care  Medicines  Your healthcare provider may prescribe medicine to help relieve pain. Follow your healthcare provider s instructions for taking these medicines.  General care  Although you can t control your family history or your hormones, you can make changes to your diet. Changing your diet won't fix your gallbladder, but it may help with the symptoms. Here are some general care guidelines:    Eat a diet that s high in fiber and low in fat. Avoid greasy or fried foods.    Read food labels to be sure the foods you are choosing are low in fat.    Limit high-fat meats, dairy products, animal fats, and vegetable oils.    Keep all appointments with your healthcare provider. Your provider needs to watch your condition.  Surgery for gallstones is generally not done during pregnancy unless the gallstones are causing severe pain or you have an infection. Discuss your treatment choices with your provider. You might need:    Medicine to dissolve the stones    A procedure called an ERCP to find the stones and remove them. The ERCP uses a thin tube with video and X-rays.    Surgery to remove the gallstones  Even after treatment, gallstones can return.  Follow-up care  Follow up with your healthcare provider, or as advised.  Call 911  Call 911 if any of these occur:    Severe  lightheadedness, passing out, or fainting    Rapid heart rate    Trouble breathing    Confusion or difficulty waking up  When to seek medical advice  Call your healthcare provider right away if any of these occur:    Fever of 100.4 F (38 C) or higher, or as directed by your healthcare provider    Severe pain in the upper belly, shoulder, or back    Nausea or vomiting    Yellowing of the skin or eyes (jaundice)    Vaginal bleeding, leakage of fluid from the vagina, or lack of fetal movement  Date Last Reviewed: 6/1/2016 2000-2017 [employer]. [address] [place] [address]. All rights reserved. This information is not intended as a substitute for professional medical care. Always follow your healthcare professional's instructions.          Your next 10 appointments already scheduled     Sep 21, 2018 11:45 AM CDT   MFM US COMPRE SINGLE F/U with [id number]   [facility] Maternal Fetal Medicine Ultrasound - [facility])    [address] [place] [id number] [phone]           Wear comfortable clothes and leave your valuables at home.            Sep 21, 2018 12:15 PM CDT   Radiology MD with  PATRICK LANDRY   [facility] Maternal Fetal Medicine [facility])    [address] [place] [address] [phone]           Please arrive at the time given for your first appointment. This visit is used internally to schedule the physician's time during your ultrasound.              24 Hour Appointment Hotline       To make an appointment at any [facility], call [phone] ([phone]). If you don't have a family doctor or clinic, we will help you find one. [facility] clinics are conveniently located to serve the needs of you and your family.             Review of your medicines      CONTINUE these medicines which may have CHANGED, or have new prescriptions. If we are uncertain of the size of tablets/capsules you  have at home, strength may be listed as something that might have changed.        Dose / Directions Last dose taken    metoclopramide 10 MG tablet   Commonly known as:  REGLAN   Dose:  10 mg   What changed:    - medication strength  - how much to take  - when to take this   Quantity:  20 tablet        Take 1 tablet (10 mg) by mouth 4 times daily as needed   Refills:  1          Our records show that you are taking the medicines listed below. If these are incorrect, please call your family doctor or clinic.        Dose / Directions Last dose taken    [employer] PRENATAL 28-0.8 MG Tabs   Dose:  1 tablet        Take 1 tablet by mouth daily   Refills:  0        PRENATAL 1 PO        Refills:  0        RANITIDINE HCL PO        Refills:  0        * VITAMIN D (CHOLECALCIFEROL) PO        Take by mouth daily   Refills:  0        * vitamin D3 2000 units Caps   Dose:  2000 Units        Take 2,000 Units by mouth daily   Refills:  0        * Notice:  This list has 2 medication(s) that are the same as other medications prescribed for you. Read the directions carefully, and ask your doctor or other care provider to review them with you.            Prescriptions were sent or printed at these locations (1 Prescription)                   Other Prescriptions                Printed at Department/Unit printer (1 of 1)         metoclopramide (REGLAN) 10 MG tablet                Procedures and tests performed during your visit     CBC with platelets differential    Comprehensive metabolic panel    Lipase    Peripheral IV: Standard    UA reflex to Microscopic and Culture    US Abdomen Limited      Orders Needing Specimen Collection     None      Pending Results     Date and Time Order Name Status Description    9/4/2018 0448 US Abdomen Limited Preliminary             Pending Culture Results     No orders found from 9/2/2018 to 9/5/2018.            Pending Results Instructions     If you had any lab results that were not finalized at the time of  your Discharge, you can call the ED Lab Result RN at [phone]. You will be contacted by this team for any positive Lab results or changes in treatment. The nurses are available 7 days a week from 10A to 6:30P.  You can leave a message 24 hours per day and they will return your call.        Test Results From Your Hospital Stay        9/4/2018  4:55 AM      Component Results     Component Value Ref Range & Units Status    WBC 13.2 (H) 4.0 - 11.0 10e9/L Final    RBC Count 3.41 (L) 3.8 - 5.2 10e12/L Final    Hemoglobin 10.2 (L) 11.7 - 15.7 g/dL Final    Hematocrit 30.7 (L) 35.0 - 47.0 % Final    MCV 90 78 - 100 fl Final    MCH 29.9 26.5 - 33.0 pg Final    MCHC 33.2 31.5 - 36.5 g/dL Final    RDW 14.3 10.0 - 15.0 % Final    Platelet Count 240 150 - 450 10e9/L Final    Diff Method Automated Method  Final    % Neutrophils 67.9 % Final    % Lymphocytes 23.0 % Final    % Monocytes 6.5 % Final    % Eosinophils 1.6 % Final    % Basophils 0.2 % Final    % Immature Granulocytes 0.8 % Final    Nucleated RBCs 0 0 /100 Final    Absolute Neutrophil 9.0 (H) 1.6 - 8.3 10e9/L Final    Absolute Lymphocytes 3.0 0.8 - 5.3 10e9/L Final    Absolute Monocytes 0.9 0.0 - 1.3 10e9/L Final    Absolute Eosinophils 0.2 0.0 - 0.7 10e9/L Final    Absolute Basophils 0.0 0.0 - 0.2 10e9/L Final    Abs Immature Granulocytes 0.1 0 - 0.4 10e9/L Final    Absolute Nucleated RBC 0.0  Final         9/4/2018  5:12 AM      Component Results     Component Value Ref Range & Units Status    Sodium 142 133 - 144 mmol/L Final    Potassium 3.9 3.4 - 5.3 mmol/L Final    Chloride 110 (H) 94 - 109 mmol/L Final    Carbon Dioxide 22 20 - 32 mmol/L Final    Anion Gap 10 3 - 14 mmol/L Final    Glucose 106 (H) 70 - 99 mg/dL Final    Urea Nitrogen 8 7 - 30 mg/dL Final    Creatinine 0.59 0.52 - 1.04 mg/dL Final    GFR Estimate >90 >60 mL/min/1.7m2 Final    Non  GFR Calc    GFR Estimate If Black >90 >60 mL/min/1.7m2 Final    African American GFR Calc     Calcium 8.2 (L) 8.5 - 10.1 mg/dL Final    Bilirubin Total 0.2 0.2 - 1.3 mg/dL Final    Albumin 2.7 (L) 3.4 - 5.0 g/dL Final    Protein Total 6.3 (L) 6.8 - 8.8 g/dL Final    Alkaline Phosphatase 73 40 - 150 U/L Final    ALT 14 0 - 50 U/L Final    AST 15 0 - 45 U/L Final         9/4/2018  5:10 AM      Component Results     Component Value Ref Range & Units Status    Lipase 183 73 - 393 U/L Final         9/4/2018  6:10 AM      Component Results     Component Value Ref Range & Units Status    Color Urine Yellow  Final    Appearance Urine Slightly Cloudy  Final    Glucose Urine Negative NEG^Negative mg/dL Final    Bilirubin Urine Negative NEG^Negative Final    Ketones Urine Negative NEG^Negative mg/dL Final    Specific Gravity Urine 1.027 1.003 - 1.035 Final    Blood Urine Negative NEG^Negative Final    pH Urine 6.0 5.0 - 7.0 pH Final    Protein Albumin Urine 30 (A) NEG^Negative mg/dL Final    Urobilinogen mg/dL 2.0 0.0 - 2.0 mg/dL Final    Nitrite Urine Negative NEG^Negative Final    Leukocyte Esterase Urine Large (A) NEG^Negative Final    Source Midstream Urine  Final         9/4/2018  6:13 AM      Narrative     ULTRASOUND ABDOMEN LIMITED RIGHT UPPER QUADRANT  9/4/2018 5:45 AM     HISTORY: Thirty-one weeks pregnant. Right upper quadrant pain.    COMPARISON: 1/3/2014 - CT abdomen and pelvis.    FINDINGS: Mild to moderate diffuse increased hepatic echogenicity,  consistent with fatty infiltration. No hepatic masses. Several tiny  gallstones within a mildly distended gallbladder. No gallbladder wall  thickening or pericholecystic fluid. The ultrasound technologist  reports that the patient has focal tenderness over the gallbladder. No  intra- or extrahepatic biliary dilatation. The common duct measures  0.4 cm in diameter. The right kidney has normal size and echogenicity,  measuring 11.7 cm in length. No right intrarenal collecting system  dilatation, calculi or masses. No free fluid in the upper right  hemiabdomen.         Impression     IMPRESSION:  1. Several tiny gallstones within a mildly distended gallbladder.  Additionally, the ultrasound technologist reports that the patient has  focal tenderness over the gallbladder. These findings are not  diagnostic of acute cholecystitis, but it is possible. If there is  clinical concern for acute cholecystitis, a HIDA scan could be  considered for further evaluation.  2. No biliary dilatation.  3. Mild to moderate diffuse fatty infiltration of the liver.                Clinical Quality Measure: Blood Pressure Screening     Your blood pressure was checked while you were in the emergency department today. The last reading we obtained was  BP: 112/69 . Please read the guidelines below about what these numbers mean and what you should do about them.  If your systolic blood pressure (the top number) is less than 120 and your diastolic blood pressure (the bottom number) is less than 80, then your blood pressure is normal. There is nothing more that you need to do about it.  If your systolic blood pressure (the top number) is 120-139 or your diastolic blood pressure (the bottom number) is 80-89, your blood pressure may be higher than it should be. You should have your blood pressure rechecked within a year by a primary care provider.  If your systolic blood pressure (the top number) is 140 or greater or your diastolic blood pressure (the bottom number) is 90 or greater, you may have high blood pressure. High blood pressure is treatable, but if left untreated over time it can put you at risk for heart attack, stroke, or kidney failure. You should have your blood pressure rechecked by a primary care provider within the next 4 weeks.  If your provider in the emergency department today gave you specific instructions to follow-up with your doctor or provider even sooner than that, you should follow that instruction and not wait for up to 4 weeks for your follow-up visit.        Thank you for  "choosing [facility]       Thank you for choosing [facility] for your care. Our goal is always to provide you with excellent care. Hearing back from our patients is one way we can continue to improve our services. Please take a few minutes to complete the written survey that you may receive in the mail after you visit with us. Thank you!        [employer] Information     [employer] lets you send messages to your doctor, view your test results, renew your prescriptions, schedule appointments and more. To sign up, go to [email or web address] . Click on \"Log in\" on the left side of the screen, which will take you to the Welcome page. Then click on \"Sign up Now\" on the right side of the page.     You will be asked to enter the access code listed below, as well as some personal information. Please follow the directions to create your username and password.     Your access code is: [email or web address]  Expires: 2018  3:09 PM     Your access code will  in 90 days. If you need help or a new code, please call your [facility] clinic or [phone].        Care EveryWhere ID     This is your Care EveryWhere ID. This could be used by other organizations to access your [facility] medical records  [id number]        Equal Access to Services     NTAHAN ROSAS : Brianna Samuel, wapremada alejandro, qaybta kaalmada hi, james murphy. So [facility] [phone].    ATENCIÓN: Si habla español, tiene a kiran disposición servicios gratuitos de asistencia lingüística. Llame al [phone].    We comply with applicable federal civil rights laws and [place] laws. We do not discriminate on the basis of race, color, national origin, age, disability, sex, sexual orientation, or gender identity.            After Visit Summary       This is your record. Keep this with you and show to your community pharmacist(s) and doctor(s) at your next visit.                  "

## 2018-09-06 LAB
BACTERIA SPEC CULT: NORMAL
Lab: NORMAL
SPECIMEN SOURCE: NORMAL

## 2018-09-15 ENCOUNTER — HOSPITAL ENCOUNTER (OUTPATIENT)
Facility: CLINIC | Age: 23
Discharge: HOME | End: 2018-09-15
Attending: REGISTERED NURSE | Admitting: ADVANCED PRACTICE MIDWIFE
Payer: COMMERCIAL

## 2018-09-15 VITALS
DIASTOLIC BLOOD PRESSURE: 64 MMHG | WEIGHT: 288 LBS | HEIGHT: 63 IN | RESPIRATION RATE: 16 BRPM | TEMPERATURE: 97.9 F | HEART RATE: 96 BPM | BODY MASS INDEX: 51.03 KG/M2 | SYSTOLIC BLOOD PRESSURE: 117 MMHG

## 2018-09-15 PROBLEM — R10.9 ABDOMINAL PAIN: Status: ACTIVE | Noted: 2018-09-15

## 2018-09-15 LAB
ALBUMIN UR-MCNC: 10 MG/DL
APPEARANCE UR: ABNORMAL
BILIRUB UR QL STRIP: NEGATIVE
CAOX CRY #/AREA URNS HPF: ABNORMAL /HPF
COLOR UR AUTO: YELLOW
GLUCOSE UR STRIP-MCNC: NEGATIVE MG/DL
HGB UR QL STRIP: NEGATIVE
KETONES UR STRIP-MCNC: 5 MG/DL
LEUKOCYTE ESTERASE UR QL STRIP: ABNORMAL
MUCOUS THREADS #/AREA URNS LPF: PRESENT /LPF
NITRATE UR QL: NEGATIVE
PH UR STRIP: 6 PH (ref 5–7)
RBC #/AREA URNS AUTO: 2 /HPF (ref 0–2)
SOURCE: ABNORMAL
SP GR UR STRIP: 1.03 (ref 1–1.03)
SQUAMOUS #/AREA URNS AUTO: 4 /HPF (ref 0–1)
UROBILINOGEN UR STRIP-MCNC: 2 MG/DL (ref 0–2)
WBC #/AREA URNS AUTO: 11 /HPF (ref 0–5)

## 2018-09-15 PROCEDURE — 59025 FETAL NON-STRESS TEST: CPT

## 2018-09-15 PROCEDURE — 81001 URINALYSIS AUTO W/SCOPE: CPT | Performed by: ADVANCED PRACTICE MIDWIFE

## 2018-09-15 PROCEDURE — 87086 URINE CULTURE/COLONY COUNT: CPT | Performed by: ADVANCED PRACTICE MIDWIFE

## 2018-09-15 PROCEDURE — G0463 HOSPITAL OUTPT CLINIC VISIT: HCPCS | Mod: 25

## 2018-09-15 RX ORDER — ONDANSETRON 2 MG/ML
4 INJECTION INTRAMUSCULAR; INTRAVENOUS EVERY 6 HOURS PRN
Status: DISCONTINUED | OUTPATIENT
Start: 2018-09-15 | End: 2018-09-15 | Stop reason: HOSPADM

## 2018-09-15 NOTE — PLAN OF CARE
The patient feels her baby move after the monitors are applied.  The patient drinks 400 ml of water.  The patient does not have any episodes of LLQ pain during the monitoring.  The baby is hard to monitor consistently due to the large pannus and the baby moving a lot during monitoring and the baby is felt moving with palpating the abdomin.  The US is held on the abdomin for 90 minutes.  The FHT's were not tracing without the additional pressure applied with the nurse holding the US monitor deep into the abdomin.  BENJY Ross is notified of the patient's status.  Jessenia speaks with the patient on the phone and she orders for the patient to be discharged to home and she orders a prescription for Macrobid 100 mg twice daily by mouth for 7 days for a UTI.  A UC is added and the prescription is called into the [facility] pharmacy off [facility] in [place].  The patient is directed to drink more fluids and to keep doing fetal kick counts to assess fetal well being.

## 2018-09-15 NOTE — DISCHARGE INSTRUCTIONS
Discharge Instruction for Undelivered Patients      You were seen for: Left lower quadrant pain and decreased fetal movement  We Consulted:  BENJY Ross  You had (Test or Medicine): Urine analysis, fetal and uterine monitoring.     Diet:   Drink 8 to 12 glasses of liquids (milk, juice, water) every day.  You may eat meals and snacks.   The low fat diet due to the gallstones during this pregnancy  Activity:  Count fetal kicks everyday (see handout)  Call your doctor or nurse midwife if your baby is moving less than usual.     Call your provider if you notice:  Swelling in your face or increased swelling in your hands or legs.  Headaches that are not relieved by Tylenol (acetaminophen).  Changes in your vision (blurring: seeing spots or stars.)  Nausea (sick to your stomach) and vomiting (throwing up).   Weight gain of 5 pounds or more per week.  Heartburn that doesn't go away.  Signs of bladder infection: pain when you urinate (use the toilet), need to go more often and more urgently.  The bag of hamilton (rupture of membranes) breaks, or you notice leaking in your underwear.  Bright red blood in your underwear.  Abdominal (lower belly) or stomach pain.  For first baby: Contractions (tightening) less than 5 minutes apart for one hour or more.  Second (plus) baby: Contractions (tightening) less than 10 minutes apart and getting stronger.  *If less than 34 weeks: Contractions (tightenings) more than 6 times in one hour.  Increase or change in vaginal discharge (note the color and amount)  Other:  The urine was concentrated showing that you were not well hydrated and the urine analysis shows a urinary tract infection. A urine culture has been added and the results will be back in 48 hours.  A prescription for Macrobid 100 mg twice daily by mouth for 7 days has been called into the [facility] pharmacy off [address].  The NST is reactive indicating fetal well being.    Follow-up:  As scheduled in the clinic

## 2018-09-15 NOTE — PLAN OF CARE
The patient arrives to the [facility] due to LLQ pain that started 2 days ago that is intermittent that last 5 to 10  minutes.  It's every hour that is sharp and it doesn't move.  The patient has a history of gallstones in July and 2 weeks ago.  The patient denies any problems with constipation.  The patient leaves a urine sample and the urine is dark and concentrated.  The patient states she had some tea to drink.  The patient has not had any other fluids. A pitcher of water is given to hydrate the patient.  The patient states she has not felt her baby move since this morning.

## 2018-09-15 NOTE — IP AVS SNAPSHOT
MRN:[id number]                      After Visit Summary   9/15/2018    Katia Rizvi    MRN: [id number]           Thank you!     Thank you for choosing [facility] for your care. Our goal is always to provide you with excellent care. Hearing back from our patients is one way we can continue to improve our services. Please take a few minutes to complete the written survey that you may receive in the mail after you visit with us. Thank you!        Patient Information     Date Of Birth          1995        About your hospital stay     You were admitted on:  September 15, 2018 You last received care in the:  [facility]    You were discharged on:  September 15, 2018       Who to Call     For medical emergencies, please call 911.  For non-urgent questions about your medical care, please call your primary care provider or clinic, [phone]          Attending Provider     Provider Specialty    Lisette Zepeda APRN [facility]    Jessenia Boyle APRN [facility]       Primary Care Provider Office Phone # Fax #    JOE Goins [phone]      Your next 10 appointments already scheduled     Sep 21, 2018 11:45 AM CDT   MFM US COMPRE SINGLE F/U with RHMFMUSR3   [facility] Ultrasound - [facility])    [address] [place] [address] [phone]           Wear comfortable clothes and leave your valuables at home.            Sep 21, 2018 12:15 PM CDT   [facility]iology [facility]   [facility] M[facility]etal [facility] - [facility])    [address]  [facility] [address] [phone]           Please arrive at the time given for your first appointment. This visit is used internally to schedule the physician's time during your ultrasound.              Further instructions from your care team       Discharge Instruction for Undelivered  Patients      You were seen for: Left lower quadrant pain and decreased fetal movement  We Consulted:  Jessenia Boyle CMW  You had (Test or Medicine): Urine analysis, fetal and uterine monitoring.     Diet:   Drink 8 to 12 glasses of liquids (milk, juice, water) every day.  You may eat meals and snacks.   The low fat diet due to the gallstones during this pregnancy  Activity:  Count fetal kicks everyday (see handout)  Call your doctor or nurse midwife if your baby is moving less than usual.     Call your provider if you notice:  Swelling in your face or increased swelling in your hands or legs.  Headaches that are not relieved by Tylenol (acetaminophen).  Changes in your vision (blurring: seeing spots or stars.)  Nausea (sick to your stomach) and vomiting (throwing up).   Weight gain of 5 pounds or more per week.  Heartburn that doesn't go away.  Signs of bladder infection: pain when you urinate (use the toilet), need to go more often and more urgently.  The bag of hamilton (rupture of membranes) breaks, or you notice leaking in your underwear.  Bright red blood in your underwear.  Abdominal (lower belly) or stomach pain.  For first baby: Contractions (tightening) less than 5 minutes apart for one hour or more.  Second (plus) baby: Contractions (tightening) less than 10 minutes apart and getting stronger.  *If less than 34 weeks: Contractions (tightenings) more than 6 times in one hour.  Increase or change in vaginal discharge (note the color and amount)  Other:  The urine was concentrated showing that you were not well hydrated and the urine analysis shows a urinary tract infection. A urine culture has been added and the results will be back in 48 hours.  A prescription for Macrobid 100 mg twice daily by mouth for 7 days has been called into the [facility] pharmacy off [address].  The NST is reactive indicating fetal well being.    Follow-up:  As scheduled in the clinic          Pending Results     Date and Time Order Name Status  "Description    9/15/2018 1647 Urine Culture Aerobic Bacterial In process             Admission Information     Date & Time Provider Department Dept. Phone    9/15/2018 Tamar Jessenia Wise, JOE LANEKAYDEN Ibarra [facility] [phone]      Your Vitals Were     Height Weight BMI (Body Mass Index)             1.6 m (5' 3\") 130.6 kg (288 lb) 51.02 kg/m2         MyCharPeer[employer]st Information     [employer] lets you send messages to your doctor, view your test results, renew your prescriptions, schedule appointments and more. To sign up, go to [email or web address] . Click on \"Log in\" on the left side of the screen, which will take you to the Welcome page. Then click on \"Sign up Now\" on the right side of the page.     You will be asked to enter the access code listed below, as well as some personal information. Please follow the directions to create your username and password.     Your access code is: [email or web address]  Expires: 2018  5:48 PM     Your access code will  in 90 days. If you need help or a new code, please call your [place] clinic or [phone].        Care EveryWhere ID     This is your Care EveryWhere ID. This could be used by other organizations to access your [place] medical records  [id number]        Equal Access to Services     [facility] ALISON : Hadii eddie william hadasho Sobryannaali, waaxda luqadaha, qaybta kaalmada adeegyada, james murphy. So [facility] [phone].    ATENCIÓN: Si habla español, tiene a kiran disposición servicios gratuitos de asistencia lingüística. Hannah al [phone].    We comply with applicable federal civil rights laws and [place] laws. We do not discriminate on the basis of race, color, national origin, age, disability, sex, sexual orientation, or gender identity.               Review of your medicines      UNREVIEWED medicines. Ask your doctor about these medicines        Dose / Directions    CVS PRENATAL 28-0.8 MG Tabs        Dose:  1 tablet   Take 1 " tablet by mouth daily   Refills:  0       PRENATAL 1 PO        Refills:  0       RANITIDINE HCL PO        Refills:  0       * VITAMIN D (CHOLECALCIFEROL) PO        Take by mouth daily   Refills:  0       * vitamin D3 2000 units Caps        Dose:  2000 Units   Take 2,000 Units by mouth daily   Refills:  0       * Notice:  This list has 2 medication(s) that are the same as other medications prescribed for you. Read the directions carefully, and ask your doctor or other care provider to review them with you.             Protect others around you: Learn how to safely use, store and throw away your medicines at www.disposemymeds.org.             Medication List: This is a list of all your medications and when to take them. Check marks below indicate your daily home schedule. Keep this list as a reference.      Medications           Morning Afternoon Evening Bedtime As Needed    CVS PRENATAL 28-0.8 MG Tabs   Take 1 tablet by mouth daily                                PRENATAL 1 PO                                RANITIDINE HCL PO                                * VITAMIN D (CHOLECALCIFEROL) PO   Take by mouth daily                                * vitamin D3 2000 units Caps   Take 2,000 Units by mouth daily                                * Notice:  This list has 2 medication(s) that are the same as other medications prescribed for you. Read the directions carefully, and ask your doctor or other care provider to review them with you.

## 2018-09-15 NOTE — IP AVS SNAPSHOT
[facility]    [address] [place] [address], [address]    [facility] [address]    Phone:  [phone]                                       After Visit Summary   9/15/2018    Katia Rizvi    MRN: [id number]           After Visit Summary Signature Page     I have received my discharge instructions, and my questions have been answered. I have discussed any challenges I see with this plan with the nurse or doctor.    ..........................................................................................................................................  Patient/Patient Representative Signature      ..........................................................................................................................................  Patient Representative Print Name and Relationship to Patient    ..................................................               ................................................  Date                                   Time    ..........................................................................................................................................  Reviewed by Signature/Title    ...................................................              ..............................................  Date                                               Time          22EPIC Rev 08/18

## 2018-09-16 LAB
BACTERIA SPEC CULT: NORMAL
Lab: NORMAL
SPECIMEN SOURCE: NORMAL

## 2018-09-21 ENCOUNTER — OFFICE VISIT (OUTPATIENT)
Dept: MATERNAL FETAL MEDICINE | Facility: CLINIC | Age: 23
End: 2018-09-21
Attending: OBSTETRICS & GYNECOLOGY
Payer: COMMERCIAL

## 2018-09-21 ENCOUNTER — HOSPITAL ENCOUNTER (OUTPATIENT)
Dept: ULTRASOUND IMAGING | Facility: CLINIC | Age: 23
Discharge: HOME | End: 2018-09-21
Attending: OBSTETRICS & GYNECOLOGY | Admitting: OBSTETRICS & GYNECOLOGY
Payer: COMMERCIAL

## 2018-09-21 DIAGNOSIS — O99.210 MATERNAL OBESITY AFFECTING PREGNANCY, ANTEPARTUM: Primary | ICD-10-CM

## 2018-09-21 DIAGNOSIS — O99.210 MATERNAL OBESITY AFFECTING PREGNANCY, ANTEPARTUM: ICD-10-CM

## 2018-09-21 PROCEDURE — 76816 OB US FOLLOW-UP PER FETUS: CPT

## 2018-09-21 NOTE — MR AVS SNAPSHOT
"              After Visit Summary   2018    Katia Rizvi    MRN: [id number]           Patient Information     Date Of Birth          1995        Visit Information        Provider Department      2018 12:15 PM Maikol Post MD [facility] Maternal Fetal Medicine [facility]        Today's Diagnoses     Maternal obesity affecting pregnancy, antepartum    -  1       Follow-ups after your visit        Who to contact     If you have questions or need follow up information about today's clinic visit or your schedule please contact [facility] directly at [phone].  Normal or non-critical lab and imaging results will be communicated to you by [employer], letter or phone within 4 business days after the clinic has received the results. If you do not hear from us within 7 days, please contact the clinic through "[employer]t or phone. If you have a critical or abnormal lab result, we will notify you by phone as soon as possible.  Submit refill requests through [employer] or call your pharmacy and they will forward the refill request to us. Please allow 3 business days for your refill to be completed.          Additional Information About Your Visit        MyChart Information     [employer] lets you send messages to your doctor, view your test results, renew your prescriptions, schedule appointments and more. To sign up, go to [email or web address] . Click on \"Log in\" on the left side of the screen, which will take you to the Welcome page. Then click on \"Sign up Now\" on the right side of the page.     You will be asked to enter the access code listed below, as well as some personal information. Please follow the directions to create your username and password.     Your access code is: [email or web address]  Expires: 2018  5:48 PM     Your access code will  in 90 days. If you need help or a new code, please call your [facility] clinic or [phone].        Care EveryWhere ID     This is " your Care EveryWhere ID. This could be used by other organizations to access your [place] medical records  [id number]         Blood Pressure from Last 3 Encounters:   09/15/18 117/64   09/04/18 106/60   09/04/18 126/64    Weight from Last 3 Encounters:   09/15/18 130.6 kg (288 lb)   07/30/18 132 kg (291 lb)   07/18/18 132 kg (291 lb 0.1 oz)              Today, you had the following     No orders found for display       Primary Care Provider Office Phone # Fax #    Lisette eZpeda, JOE [facility] [phone]       [facility] OBGYN CONSULT [address]  [facility] [address]        Equal Access to Services     NATHAN ROSAS : Hadii eddie ku hadasho Soomaali, waaxda luqadaha, qaybta kaalmada adeegyada, waxay idiin hayluigi mackay . So [facility] [phone].    ATENCIÓN: Si habla español, tiene a kiran disposición servicios gratuitos de asistencia lingüística. [facility] [phone].    We comply with applicable federal civil rights laws and [place] laws. We do not discriminate on the basis of race, color, national origin, age, disability, sex, sexual orientation, or gender identity.            Thank you!     Thank you for choosing [facility]  for your care. Our goal is always to provide you with excellent care. Hearing back from our patients is one way we can continue to improve our services. Please take a few minutes to complete the written survey that you may receive in the mail after your visit with us. Thank you!             Your Updated Medication List - Protect others around you: Learn how to safely use, store and throw away your medicines at www.disposemymeds.org.          This list is accurate as of 9/21/18  3:43 PM.  Always use your most recent med list.                   Brand Name Dispense Instructions for use Diagnosis    CVS PRENATAL 28-0.8 MG Tabs      Take 1 tablet by mouth daily        PRENATAL 1 PO           RANITIDINE HCL PO           * VITAMIN D  (CHOLECALCIFEROL) PO      Take by mouth daily        * vitamin D3 2000 units Caps      Take 2,000 Units by mouth daily        * Notice:  This list has 2 medication(s) that are the same as other medications prescribed for you. Read the directions carefully, and ask your doctor or other care provider to review them with you.

## 2018-09-21 NOTE — PROGRESS NOTES
"Please see \"Imaging\" tab under \"Chart Review\" for details of today's US at the [facility].    Maikol Post MD  Maternal-Fetal Medicine    "

## 2018-10-08 ENCOUNTER — HOSPITAL ENCOUNTER (OUTPATIENT)
Dept: ULTRASOUND IMAGING | Facility: CLINIC | Age: 23
Discharge: HOME | End: 2018-10-08
Attending: OBSTETRICS & GYNECOLOGY | Admitting: OBSTETRICS & GYNECOLOGY
Payer: COMMERCIAL

## 2018-10-08 ENCOUNTER — OFFICE VISIT (OUTPATIENT)
Dept: MATERNAL FETAL MEDICINE | Facility: CLINIC | Age: 23
End: 2018-10-08
Attending: OBSTETRICS & GYNECOLOGY
Payer: COMMERCIAL

## 2018-10-08 DIAGNOSIS — O26.90 PREGNANCY RELATED CONDITION, ANTEPARTUM: ICD-10-CM

## 2018-10-08 DIAGNOSIS — O99.213 OBESITY AFFECTING PREGNANCY IN THIRD TRIMESTER: Primary | ICD-10-CM

## 2018-10-08 PROCEDURE — 76819 FETAL BIOPHYS PROFIL W/O NST: CPT

## 2018-10-08 NOTE — PROGRESS NOTES
Please see full imaging report from ViewPoint program under imaging tab.    BPP 8/8. Vertex.     Coral Merrill MD  Maternal Fetal Medicine

## 2018-10-08 NOTE — MR AVS SNAPSHOT
After Visit Summary   10/8/2018    Katia Rizvi    MRN: [id number]           Patient Information     Date Of Birth          1995        Visit Information        Provider Department      10/8/2018 3:30 PM [facility]        Today's Diagnoses     Obesity affecting pregnancy in third trimester    -  1       Follow-ups after your visit        Your next 10 appointments already scheduled     Oct 15, 2018  3:30 PM CDT   MFM BPP SINGLE with SHMFMUSR1   [facility] ([facility])    [address] [place] [address]   [phone]            Oct 15, 2018  4:00 PM CDT   Radiology MD with NATIVIDAD NGUYEN MD   [facility] Maternal Fetal Medicine [facility] ([facility])    [address] [place] [address] [phone]           Please arrive at the time given for your first appointment. This visit is used internally to schedule the physician's time during your ultrasound.            Oct 22, 2018  1:30 PM CDT   MFM US COMPRE SINGLE F/U with SHMFMUSR3   [facility] ([facility])    [address] [place] [address] [phone]           Wear comfortable clothes and leave your valuables at home.            Oct 22, 2018  2:00 PM CDT   Radiology MD with NATIVIDAD NGUYEN MD   [facility] Maternal Fetal Medicine [facility] ([facility])    [address] [place] [address] [phone]           Please arrive at the time given for your first appointment. This visit is used internally to schedule the physician's time during your ultrasound.            Oct 29, 2018  3:00 PM CDT   MFM BPP SINGLE with SHMFMUSR1   [facility] ([facility])    [address] [place] [address]  "[place] [address]   [phone]            Oct 29, 2018  3:30 PM CDT   Radiology MD with SH PATRICK LANDRY   [facility] Maternal Fetal Medicine [facility] ([facility])    [address] [place] [address]   [phone]           Please arrive at the time given for your first appointment. This visit is used internally to schedule the physician's time during your ultrasound.              Future tests that were ordered for you today     Open Future Orders        Priority Expected Expires Ordered    MFM BPP Single Routine 10/15/2018 8/8/2019 10/8/2018    [facility] US Comprehensive Single F/U Routine 10/22/2018 10/8/2019 10/8/2018    MFM BPP Single Routine 10/29/2018 8/8/2019 10/8/2018            Who to contact     If you have questions or need follow up information about today's clinic visit or your schedule please contact [facility]ER[facility] directly at [phone].  Normal or non-critical lab and imaging results will be communicated to you by [employer], letter or phone within 4 business days after the clinic has received the results. If you do not hear from us within 7 days, please contact the clinic through [employer] or phone. If you have a critical or abnormal lab result, we will notify you by phone as soon as possible.  Submit refill requests through [employer] or call your pharmacy and they will forward the refill request to us. Please allow 3 business days for your refill to be completed.          Additional Information About Your Visit        [employer] Information     [employer] lets you send messages to your doctor, view your test results, renew your prescriptions, schedule appointments and more. To sign up, go to [email or web address] . Click on \"Log in\" on the left side of the screen, which will take you to the Welcome page. Then click on \"Sign up Now\" on the right side of the page.     You will be asked to enter the access code listed below, as well as some personal " information. Please follow the directions to create your username and password.     Your access code is: [email or web address]  Expires: 2018  5:48 PM     Your access code will  in 90 days. If you need help or a new code, please call your [place] clinic or [phone].        Care EveryWhere ID     This is your Care EveryWhere ID. This could be used by other organizations to access your [place] medical records  [id number]         Blood Pressure from Last 3 Encounters:   09/15/18 117/64   18 106/60   18 126/64    Weight from Last 3 Encounters:   09/15/18 130.6 kg (288 lb)   18 132 kg (291 lb)   18 132 kg (291 lb 0.1 oz)               Primary Care Provider Office Phone # Fax #    Lisette JOE Portillo [facility] [phone]       [facility] OBGYN CONSULT [address] [facility] [address]  [facility] [address]        Equal Access to Services     [facility]: Hadii aad ku hadasho Soomaali, waaxda luqadaha, qaybta kaalmada adeegyada, waxay gomez hayluigi mackay . So [facility] [phone].    ATENCIÓN: Si habla español, tiene a kiran disposición servicios gratuitos de asistencia lingüística. Llame al [phone].    We comply with applicable federal civil rights laws and [place] laws. We do not discriminate on the basis of race, color, national origin, age, disability, sex, sexual orientation, or gender identity.            Thank you!     Thank you for choosing [facility]  for your care. Our goal is always to provide you with excellent care. Hearing back from our patients is one way we can continue to improve our services. Please take a few minutes to complete the written survey that you may receive in the mail after your visit with us. Thank you!             Your Updated Medication List - Protect others around you: Learn how to safely use, store and throw away your medicines at www.disposemymeds.org.          This list is accurate as of 10/8/18  4:02  PM.  Always use your most recent med list.                   Brand Name Dispense Instructions for use Diagnosis    CVS PRENATAL 28-0.8 MG Tabs      Take 1 tablet by mouth daily        PRENATAL 1 PO           RANITIDINE HCL PO           * VITAMIN D (CHOLECALCIFEROL) PO      Take by mouth daily        * vitamin D3 2000 units Caps      Take 2,000 Units by mouth daily        * Notice:  This list has 2 medication(s) that are the same as other medications prescribed for you. Read the directions carefully, and ask your doctor or other care provider to review them with you.

## 2018-10-12 LAB — GROUP B STREP PCR: NORMAL

## 2018-10-15 ENCOUNTER — HOSPITAL ENCOUNTER (OUTPATIENT)
Dept: ULTRASOUND IMAGING | Facility: CLINIC | Age: 23
Discharge: HOME | End: 2018-10-15
Attending: OBSTETRICS & GYNECOLOGY | Admitting: OBSTETRICS & GYNECOLOGY
Payer: COMMERCIAL

## 2018-10-15 ENCOUNTER — OFFICE VISIT (OUTPATIENT)
Dept: MATERNAL FETAL MEDICINE | Facility: CLINIC | Age: 23
End: 2018-10-15
Attending: OBSTETRICS & GYNECOLOGY
Payer: COMMERCIAL

## 2018-10-15 DIAGNOSIS — O99.213 OBESITY AFFECTING PREGNANCY IN THIRD TRIMESTER: ICD-10-CM

## 2018-10-15 DIAGNOSIS — O99.213 OBESITY AFFECTING PREGNANCY IN THIRD TRIMESTER: Primary | ICD-10-CM

## 2018-10-15 PROCEDURE — 76819 FETAL BIOPHYS PROFIL W/O NST: CPT

## 2018-10-15 NOTE — MR AVS SNAPSHOT
After Visit Summary   10/15/2018    Katia Rizvi    MRN: [id number]           Patient Information     Date Of Birth          1995        Visit Information        Provider Department      10/15/2018 4:00 PM Kaylyn Kinney MD [facility] Maternal Fetal Medicine [facility]        Today's Diagnoses     Obesity affecting pregnancy in third trimester    -  1       Follow-ups after your visit        Your next 10 appointments already scheduled     Oct 15, 2018  4:00 PM CDT   Radiology MD with Kaylyn Kinney MD   [facility] Maternal Fetal Medicine [facility] ([facility])    [address] [place] [address]   [phone]           Please arrive at the time given for your first appointment. This visit is used internally to schedule the physician's time during your ultrasound.            Oct 22, 2018  1:30 PM CDT   MFM US COMPRE SINGLE F/U with SHMFMUSR3   [facility] Maternal Fetal Medicine Ultrasound - [facility] ([facility])    [address] [place] [id number] [phone]           Wear comfortable clothes and leave your valuables at home.            Oct 22, 2018  2:00 PM CDT   Radiology MD with NATIVIDAD NGUYEN MD   [facility] Maternal Fetal Medicine [facility] ([facility])    [address] [place] [address] [phone]           Please arrive at the time given for your first appointment. This visit is used internally to schedule the physician's time during your ultrasound.            Oct 29, 2018  3:00 PM CDT   PATRICK BPP SINGLE with SHMFMUSR1   [facility] Maternal Fetal Medicine Ultrasound - [facility] ([facility])    [address] [place] [address]   [phone]            Oct 29, 2018  3:30 PM CDT   Radiology MD with NATIVIDAD NGUYEN MD   [facility] Maternal Fetal Medicine [facility] ([facility])    [address] [place] [address]  "[place] [id number] [phone]           Please arrive at the time given for your first appointment. This visit is used internally to schedule the physician's time during your ultrasound.              Who to contact     If you have questions or need follow up information about today's clinic visit or your schedule please contact [facility] MATERNAL FETAL MEDICINE [facility] directly at [phone].  Normal or non-critical lab and imaging results will be communicated to you by TraktoPROhart, letter or phone within 4 business days after the clinic has received the results. If you do not hear from us within 7 days, please contact the clinic through TraktoPROhart or phone. If you have a critical or abnormal lab result, we will notify you by phone as soon as possible.  Submit refill requests through [employer] or call your pharmacy and they will forward the refill request to us. Please allow 3 business days for your refill to be completed.          Additional Information About Your Visit        TraktoPROharSellaround Information     [employer] lets you send messages to your doctor, view your test results, renew your prescriptions, schedule appointments and more. To sign up, go to [email or web address] . Click on \"Log in\" on the left side of the screen, which will take you to the Welcome page. Then click on \"Sign up Now\" on the right side of the page.     You will be asked to enter the access code listed below, as well as some personal information. Please follow the directions to create your username and password.     Your access code is: [email or web address]  Expires: 2018  5:48 PM     Your access code will  in 90 days. If you need help or a new code, please call your [place] clinic or [phone].        Care EveryWhere ID     This is your Care EveryWhere ID. This could be used by other organizations to access your [place] medical records  [id number]         Blood Pressure from Last 3 Encounters:   09/15/18 117/64   18 106/60 "   09/04/18 126/64    Weight from Last 3 Encounters:   09/15/18 130.6 kg (288 lb)   07/30/18 132 kg (291 lb)   07/18/18 132 kg (291 lb 0.1 oz)              Today, you had the following     No orders found for display       Primary Care Provider Office Phone # Fax #    JOE Goins [facility] [phone]       [facility] OBGYN CONSULT [address]  [facility] [address]        Equal Access to Services     [facility]: Hadii aad ku hadasho Soomaali, waaxda luqadaha, qaybta kaalmada adeegyada, waxay idiin hayaan kathy mackay . So [facility] [place] [facility] [phone].    ATENCIÓN: Si habla español, tiene a kiran disposición servicios gratuitos de asistencia lingüística. Llame al [phone].    We comply with applicable federal civil rights laws and [place] laws. We do not discriminate on the basis of race, color, national origin, age, disability, sex, sexual orientation, or gender identity.            Thank you!     Thank you for choosing [facility] - [facility]  for your care. Our goal is always to provide you with excellent care. Hearing back from our patients is one way we can continue to improve our services. Please take a few minutes to complete the written survey that you may receive in the mail after your visit with us. Thank you!             Your Updated Medication List - Protect others around you: Learn how to safely use, store and throw away your medicines at www.disposemymeds.org.          This list is accurate as of 10/15/18  3:55 PM.  Always use your most recent med list.                   Brand Name Dispense Instructions for use Diagnosis    CVS PRENATAL 28-0.8 MG Tabs      Take 1 tablet by mouth daily        PRENATAL 1 PO           RANITIDINE HCL PO           * VITAMIN D (CHOLECALCIFEROL) PO      Take by mouth daily        * vitamin D3 2000 units Caps      Take 2,000 Units by mouth daily        * Notice:  This list has 2 medication(s) that are the same as other  medications prescribed for you. Read the directions carefully, and ask your doctor or other care provider to review them with you.

## 2018-10-15 NOTE — PROGRESS NOTES
23 year old   at 37w0d    Seen at [facility] for  1.   2.     Called       [unfilled]    To review results:      Plan:        Kaylyn Kinney MD  , OB/GYN  Maternal-Fetal Medicine  [email or web address]  [phone] (Academic office)  [phone] (Pager)

## 2018-10-22 ENCOUNTER — HOSPITAL ENCOUNTER (OUTPATIENT)
Dept: ULTRASOUND IMAGING | Facility: CLINIC | Age: 23
Discharge: HOME | End: 2018-10-22
Attending: OBSTETRICS & GYNECOLOGY | Admitting: OBSTETRICS & GYNECOLOGY
Payer: COMMERCIAL

## 2018-10-22 ENCOUNTER — OFFICE VISIT (OUTPATIENT)
Dept: MATERNAL FETAL MEDICINE | Facility: CLINIC | Age: 23
End: 2018-10-22
Attending: OBSTETRICS & GYNECOLOGY
Payer: COMMERCIAL

## 2018-10-22 DIAGNOSIS — O99.213 OBESITY AFFECTING PREGNANCY IN THIRD TRIMESTER: ICD-10-CM

## 2018-10-22 DIAGNOSIS — O99.213 OBESITY AFFECTING PREGNANCY IN THIRD TRIMESTER: Primary | ICD-10-CM

## 2018-10-22 PROCEDURE — 76819 FETAL BIOPHYS PROFIL W/O NST: CPT | Performed by: OBSTETRICS & GYNECOLOGY

## 2018-10-22 PROCEDURE — 76816 OB US FOLLOW-UP PER FETUS: CPT

## 2018-10-22 NOTE — PROGRESS NOTES
Please see ultrasound report under imaging tab for details on ultrasound performed today.    Kaylyn Kinney MD  , OB/GYN  Maternal-Fetal Medicine  [email or web address]  [phone] (Academic office)  [phone] (Pager)

## 2018-10-22 NOTE — MR AVS SNAPSHOT
After Visit Summary   10/22/2018    Katia Rizvi    MRN: [id number]           Patient Information     Date Of Birth          1995        Visit Information        Provider Department      10/22/2018 2:00 PM Kaylyn Kinney MD [facility] Maternal Fetal Medicine [facility]        Today's Diagnoses     Obesity affecting pregnancy in third trimester    -  1       Follow-ups after your visit        Your next 10 appointments already scheduled     Oct 29, 2018  3:00 PM CDT   MFM BPP SINGLE with SHMFMUSR1   [facility] Maternal Fetal Medicine Ultrasound [facility] ([facility])    [address] [place] [address] [address]  [facility] [id number] [phone]            Oct 29, 2018  3:30 PM CDT   Radiology MD with SH PATRICK LANDRY   [facility] Maternal Fetal Medicine [facility])    [address] [place] [address]  [facility] [id number] [phone]           Please arrive at the time given for your first appointment. This visit is used internally to schedule the physician's time during your ultrasound.              Who to contact     If you have questions or need follow up information about today's clinic visit or your schedule please contact [facility] MATERNAL FETAL MEDICINE [facility] directly at [phone].  Normal or non-critical lab and imaging results will be communicated to you by G[employer], letter or phone within 4 business days after the clinic has received the results. If you do not hear from us within 7 days, please contact the clinic through [employer] or phone. If you have a critical or abnormal lab result, we will notify you by phone as soon as possible.  Submit refill requests through [employer] or call your pharmacy and they will forward the refill request to us. Please allow 3 business days for your refill to be completed.          Additional Information About Your Visit        [employer] Information     [employer] lets you send messages to your doctor, view your  "test results, renew your prescriptions, schedule appointments and more. To sign up, go to [email or web address] . Click on \"Log in\" on the left side of the screen, which will take you to the Welcome page. Then click on \"Sign up Now\" on the right side of the page.     You will be asked to enter the access code listed below, as well as some personal information. Please follow the directions to create your username and password.     Your access code is: [email or web address]  Expires: 2018  5:48 PM     Your access code will  in 90 days. If you need help or a new code, please call your [place] clinic or [phone].        Care EveryWhere ID     This is your Care EveryWhere ID. This could be used by other organizations to access your [place] medical records  [id number]         Blood Pressure from Last 3 Encounters:   09/15/18 117/64   18 106/60   18 126/64    Weight from Last 3 Encounters:   09/15/18 130.6 kg (288 lb)   18 132 kg (291 lb)   18 132 kg (291 lb 0.1 oz)              Today, you had the following     No orders found for display       Primary Care Provider Office Phone # Fax #    Lisette Mossmagdalene Zepeda APRAVINASH [facility] [phone]       [facility] OBGYN CONSULT [address] [address]        Equal Access to Services     [facility]: Hadii aad ku hadasho Soomaali, waaxda luqadaha, qaybta kaalmada adeegyada, james dyer haylugii mackay . So [facility] [phone].    ATENCIÓN: Si habla aydee, tiene a kiran disposición servicios gratuitos de asistencia lingüística. Llame al [phone].    We comply with applicable federal civil rights laws and [place] laws. We do not discriminate on the basis of race, color, national origin, age, disability, sex, sexual orientation, or gender identity.            Thank you!     Thank you for choosing [facility]  for your care. Our goal is always to provide you with excellent care. " Hearing back from our patients is one way we can continue to improve our services. Please take a few minutes to complete the written survey that you may receive in the mail after your visit with us. Thank you!             Your Updated Medication List - Protect others around you: Learn how to safely use, store and throw away your medicines at www.disposemymeds.org.          This list is accurate as of 10/22/18  2:15 PM.  Always use your most recent med list.                   Brand Name Dispense Instructions for use Diagnosis    CVS PRENATAL 28-0.8 MG Tabs      Take 1 tablet by mouth daily        PRENATAL 1 PO           RANITIDINE HCL PO           * VITAMIN D (CHOLECALCIFEROL) PO      Take by mouth daily        * vitamin D3 2000 units Caps      Take 2,000 Units by mouth daily        * Notice:  This list has 2 medication(s) that are the same as other medications prescribed for you. Read the directions carefully, and ask your doctor or other care provider to review them with you.

## 2018-10-29 ENCOUNTER — OFFICE VISIT (OUTPATIENT)
Dept: MATERNAL FETAL MEDICINE | Facility: CLINIC | Age: 23
End: 2018-10-29
Attending: OBSTETRICS & GYNECOLOGY
Payer: COMMERCIAL

## 2018-10-29 ENCOUNTER — HOSPITAL ENCOUNTER (OUTPATIENT)
Dept: ULTRASOUND IMAGING | Facility: CLINIC | Age: 23
Discharge: HOME | End: 2018-10-29
Attending: OBSTETRICS & GYNECOLOGY | Admitting: OBSTETRICS & GYNECOLOGY
Payer: COMMERCIAL

## 2018-10-29 DIAGNOSIS — O99.213 OBESITY AFFECTING PREGNANCY IN THIRD TRIMESTER: Primary | ICD-10-CM

## 2018-10-29 DIAGNOSIS — O99.213 OBESITY AFFECTING PREGNANCY IN THIRD TRIMESTER: ICD-10-CM

## 2018-10-29 PROCEDURE — 76819 FETAL BIOPHYS PROFIL W/O NST: CPT

## 2018-10-29 NOTE — MR AVS SNAPSHOT
After Visit Summary   10/29/2018    Katia Rizvi    MRN: [id number]           Patient Information     Date Of Birth          1995        Visit Information        Provider Department      10/29/2018 3:30 PM Coral Merrill MD [facility] Maternal Fetal Medicine [facility]        Today's Diagnoses     Obesity affecting pregnancy in third trimester    -  1       Follow-ups after your visit        Your next 10 appointments already scheduled     Nov 05, 2018  3:00 PM CST   MFM BPP SINGLE with SHMFMUSR3   [facility] Maternal Fetal Medicine Ultrasound - [facility] ([facility])    [address] [facility] [address]  [facility] [address]   [phone]            Nov 05, 2018  3:30 PM CST   Radiology MD with SH PATRICK LANDYR   [facility] Maternal Fetal Medicine [facility] ([facility])    [address] [facility] [address]  [facility] [address]   [phone]           Please arrive at the time given for your first appointment. This visit is used internally to schedule the physician's time during your ultrasound.              Future tests that were ordered for you today     Open Future Orders        Priority Expected Expires Ordered    MFM BPP Single Routine 11/5/2018 8/29/2019 10/29/2018            Who to contact     If you have questions or need follow up information about today's clinic visit or your schedule please contact [facility] MATERNAL FETAL MEDICINE [facility] directly at [phone].  Normal or non-critical lab and imaging results will be communicated to you by MyChart, letter or phone within 4 business days after the clinic has received the results. If you do not hear from us within 7 days, please contact the clinic through MyChart or phone. If you have a critical or abnormal lab result, we will notify you by phone as soon as possible.  Submit refill requests through [employer] or call your pharmacy and they will forward the refill request to us. Please allow 3 business  "days for your refill to be completed.          Additional Information About Your Visit        MyChart Information     [employer] lets you send messages to your doctor, view your test results, renew your prescriptions, schedule appointments and more. To sign up, go to [email or web address] . Click on \"Log in\" on the left side of the screen, which will take you to the Welcome page. Then click on \"Sign up Now\" on the right side of the page.     You will be asked to enter the access code listed below, as well as some personal information. Please follow the directions to create your username and password.     Your access code is: [email or web address]  Expires: 2018  5:48 PM     Your access code will  in 90 days. If you need help or a new code, please call your [place] clinic or [phone].        Care EveryWhere ID     This is your Care EveryWhere ID. This could be used by other organizations to access your [place] medical records  [id number]         Blood Pressure from Last 3 Encounters:   09/15/18 117/64   18 106/60   18 126/64    Weight from Last 3 Encounters:   09/15/18 130.6 kg (288 lb)   18 132 kg (291 lb)   18 132 kg (291 lb 0.1 oz)               Primary Care Provider Office Phone # Fax #    Lisette Matamoros [place], JOE [facility] [phone]       [facility] [address] [address]        Equal Access to Services     [facility]: Hadii aad ku hadasho Soomaali, waaxda luqadaha, qaybta kaalmada adeegyada, james mackay . So [facility] [phone].    ATENCIÓN: Si habla español, tiene a kiran disposición servicios gratuitos de asistencia lingüística. Llame al [phone].    We comply with applicable federal civil rights laws and [place] laws. We do not discriminate on the basis of race, color, national origin, age, disability, sex, sexual orientation, or gender identity.            Thank you!     Thank you for choosing " [facility]  for your care. Our goal is always to provide you with excellent care. Hearing back from our patients is one way we can continue to improve our services. Please take a few minutes to complete the written survey that you may receive in the mail after your visit with us. Thank you!             Your Updated Medication List - Protect others around you: Learn how to safely use, store and throw away your medicines at www.disposemymeds.org.          This list is accurate as of 10/29/18  3:41 PM.  Always use your most recent med list.                   Brand Name Dispense Instructions for use Diagnosis    CVS PRENATAL 28-0.8 MG Tabs      Take 1 tablet by mouth daily        PRENATAL 1 PO           RANITIDINE HCL PO           * VITAMIN D (CHOLECALCIFEROL) PO      Take by mouth daily        * vitamin D3 2000 units Caps      Take 2,000 Units by mouth daily        * Notice:  This list has 2 medication(s) that are the same as other medications prescribed for you. Read the directions carefully, and ask your doctor or other care provider to review them with you.

## 2018-11-05 ENCOUNTER — OFFICE VISIT (OUTPATIENT)
Dept: MATERNAL FETAL MEDICINE | Facility: CLINIC | Age: 23
End: 2018-11-05
Attending: OBSTETRICS & GYNECOLOGY
Payer: COMMERCIAL

## 2018-11-05 ENCOUNTER — HOSPITAL ENCOUNTER (OUTPATIENT)
Dept: ULTRASOUND IMAGING | Facility: CLINIC | Age: 23
Discharge: HOME | End: 2018-11-05
Attending: OBSTETRICS & GYNECOLOGY | Admitting: REGISTERED NURSE
Payer: COMMERCIAL

## 2018-11-05 DIAGNOSIS — O48.0 POST-TERM PREGNANCY, 40-42 WEEKS OF GESTATION: ICD-10-CM

## 2018-11-05 DIAGNOSIS — O99.213 OBESITY AFFECTING PREGNANCY IN THIRD TRIMESTER: Primary | ICD-10-CM

## 2018-11-05 DIAGNOSIS — O99.213 OBESITY AFFECTING PREGNANCY IN THIRD TRIMESTER: ICD-10-CM

## 2018-11-05 PROCEDURE — 76819 FETAL BIOPHYS PROFIL W/O NST: CPT

## 2018-11-05 NOTE — NURSING NOTE
Katia presents to [facility] for BPP. Positive fetal movement. Denies LOF, vaginal bleeding or intense contractions. Does complain of vaginal pressure and some cramping. We discussed [facility] and scheduling BPP for Thursday now that she will be past her due date. Per prenatal, MD is planning IOL at 41 weeks. Patient will stop at  to schedule BPP for this Thursday.

## 2018-11-06 NOTE — PROGRESS NOTES
Please see ultrasound report under imaging tab for details on ultrasound performed today.    Kaylyn Kinney MD  , OB/GYN  Maternal-Fetal Medicine  [email or web address]  [phone] (Academic office)  [phone] (Pager)

## 2018-11-08 ENCOUNTER — OFFICE VISIT (OUTPATIENT)
Dept: MATERNAL FETAL MEDICINE | Facility: CLINIC | Age: 23
End: 2018-11-08
Attending: OBSTETRICS & GYNECOLOGY
Payer: COMMERCIAL

## 2018-11-08 ENCOUNTER — HOSPITAL ENCOUNTER (OUTPATIENT)
Dept: ULTRASOUND IMAGING | Facility: CLINIC | Age: 23
Discharge: HOME | End: 2018-11-08
Attending: OBSTETRICS & GYNECOLOGY | Admitting: OBSTETRICS & GYNECOLOGY
Payer: COMMERCIAL

## 2018-11-08 DIAGNOSIS — O48.0 POST-TERM PREGNANCY, 40-42 WEEKS OF GESTATION: ICD-10-CM

## 2018-11-08 DIAGNOSIS — O99.213 OBESITY AFFECTING PREGNANCY IN THIRD TRIMESTER: ICD-10-CM

## 2018-11-08 DIAGNOSIS — O99.213 OBESITY AFFECTING PREGNANCY IN THIRD TRIMESTER: Primary | ICD-10-CM

## 2018-11-08 PROCEDURE — 76819 FETAL BIOPHYS PROFIL W/O NST: CPT

## 2018-11-08 NOTE — MR AVS SNAPSHOT
"              After Visit Summary   2018    Katia Rizvi    MRN: [id number]           Patient Information     Date Of Birth          1995        Visit Information        Provider Department      2018 3:30 PM Kaylyn Kinney MD [facility] Maternal Fetal Medicine [facility]        Today's Diagnoses     Obesity affecting pregnancy in third trimester    -  1    Post-term pregnancy, 40-42 weeks of gestation           Follow-ups after your visit        Who to contact     If you have questions or need follow up information about today's clinic visit or your schedule please contact [facility] MATERNAL FETAL MEDICINE [facility] directly at [phone].  Normal or non-critical lab and imaging results will be communicated to you by MyChart, letter or phone within 4 business days after the clinic has received the results. If you do not hear from us within 7 days, please contact the clinic through [employer] or phone. If you have a critical or abnormal lab result, we will notify you by phone as soon as possible.  Submit refill requests through [employer] or call your pharmacy and they will forward the refill request to us. Please allow 3 business days for your refill to be completed.          Additional Information About Your Visit        MyChart Information     [employer] lets you send messages to your doctor, view your test results, renew your prescriptions, schedule appointments and more. To sign up, go to [email or web address] . Click on \"Log in\" on the left side of the screen, which will take you to the Welcome page. Then click on \"Sign up Now\" on the right side of the page.     You will be asked to enter the access code listed below, as well as some personal information. Please follow the directions to create your username and password.     Your access code is: [email or web address]  Expires: 2018  4:48 PM     Your access code will  in 90 days. If you need help or a new code, please call your [facility] or [phone].        Care EveryWhere ID     This is your Care EveryWhere ID. This could be used by other organizations to access your [place] medical records  [id number]         Blood Pressure from Last 3 Encounters:   09/15/18 117/64   09/04/18 106/60   09/04/18 126/64    Weight from Last 3 Encounters:   09/15/18 130.6 kg (288 lb)   07/30/18 132 kg (291 lb)   07/18/18 132 kg (291 lb 0.1 oz)              Today, you had the following     No orders found for display       Primary Care Provider Office Phone # Fax #    Lisette Renteriasher, APRN [facility] [phone]       [facility] OBGYN CONSULT [address]  [place] [address]        Equal Access to Services     [facility] : Hadii aad ku hadasho Soomaali, waaxda luqadaha, qaybta kaalmada adeegyada, james mackay . So [facility] [phone].    ATENCIÓN: Si habla español, tiene a kiran disposición servicios gratuitos de asistencia lingüística. Hannah al [phone].    We comply with applicable federal civil rights laws and [place] laws. We do not discriminate on the basis of race, color, national origin, age, disability, sex, sexual orientation, or gender identity.            Thank you!     Thank you for choosing [facility]  for your care. Our goal is always to provide you with excellent care. Hearing back from our patients is one way we can continue to improve our services. Please take a few minutes to complete the written survey that you may receive in the mail after your visit with us. Thank you!             Your Updated Medication List - Protect others around you: Learn how to safely use, store and throw away your medicines at www.disposemymeds.org.          This list is accurate as of 11/8/18 10:17 PM.  Always use your most recent med list.                   Brand Name Dispense Instructions for use Diagnosis    CVS PRENATAL 28-0.8 MG Tabs      Take 1 tablet by mouth daily         PRENATAL 1 PO           RANITIDINE HCL PO           * VITAMIN D (CHOLECALCIFEROL) PO      Take by mouth daily        * vitamin D3 2000 units Caps      Take 2,000 Units by mouth daily        * Notice:  This list has 2 medication(s) that are the same as other medications prescribed for you. Read the directions carefully, and ask your doctor or other care provider to review them with you.

## 2018-11-09 NOTE — PROGRESS NOTES
Please see ultrasound report under imaging tab for details on ultrasound performed today.    Kaylyn Kinney MD  , OB/GYN  Maternal-Fetal Medicine  [email or web address]  [phone] (Academic office)  [phone] (Pager)

## 2018-11-11 ENCOUNTER — HOSPITAL ENCOUNTER (INPATIENT)
Facility: CLINIC | Age: 23
LOS: 2 days | Discharge: HOME HEALTH | End: 2018-11-14
Attending: REGISTERED NURSE | Admitting: REGISTERED NURSE
Payer: COMMERCIAL

## 2018-11-11 LAB
ABO + RH BLD: NORMAL
ABO + RH BLD: NORMAL
BASOPHILS # BLD AUTO: 0 10E9/L (ref 0–0.2)
BASOPHILS NFR BLD AUTO: 0.2 %
BLD GP AB SCN SERPL QL: NORMAL
BLOOD BANK CMNT PATIENT-IMP: NORMAL
DIFFERENTIAL METHOD BLD: ABNORMAL
EOSINOPHIL # BLD AUTO: 0.1 10E9/L (ref 0–0.7)
EOSINOPHIL NFR BLD AUTO: 1.1 %
ERYTHROCYTE [DISTWIDTH] IN BLOOD BY AUTOMATED COUNT: 14.9 % (ref 10–15)
HCT VFR BLD AUTO: 31.2 % (ref 35–47)
HGB BLD-MCNC: 10.4 G/DL (ref 11.7–15.7)
IMM GRANULOCYTES # BLD: 0.1 10E9/L (ref 0–0.4)
IMM GRANULOCYTES NFR BLD: 0.7 %
LYMPHOCYTES # BLD AUTO: 3.3 10E9/L (ref 0.8–5.3)
LYMPHOCYTES NFR BLD AUTO: 24.5 %
MCH RBC QN AUTO: 29.8 PG (ref 26.5–33)
MCHC RBC AUTO-ENTMCNC: 33.3 G/DL (ref 31.5–36.5)
MCV RBC AUTO: 89 FL (ref 78–100)
MONOCYTES # BLD AUTO: 1.3 10E9/L (ref 0–1.3)
MONOCYTES NFR BLD AUTO: 10 %
NEUTROPHILS # BLD AUTO: 8.5 10E9/L (ref 1.6–8.3)
NEUTROPHILS NFR BLD AUTO: 63.5 %
NRBC # BLD AUTO: 0 10*3/UL
NRBC BLD AUTO-RTO: 0 /100
PLATELET # BLD AUTO: 236 10E9/L (ref 150–450)
RBC # BLD AUTO: 3.49 10E12/L (ref 3.8–5.2)
SPECIMEN EXP DATE BLD: NORMAL
WBC # BLD AUTO: 13.3 10E9/L (ref 4–11)

## 2018-11-11 PROCEDURE — 85025 COMPLETE CBC W/AUTO DIFF WBC: CPT | Performed by: REGISTERED NURSE

## 2018-11-11 PROCEDURE — 86901 BLOOD TYPING SEROLOGIC RH(D): CPT | Performed by: REGISTERED NURSE

## 2018-11-11 PROCEDURE — 3E0P7VZ INTRODUCTION OF HORMONE INTO FEMALE REPRODUCTIVE, VIA NATURAL OR ARTIFICIAL OPENING: ICD-10-PCS | Performed by: REGISTERED NURSE

## 2018-11-11 PROCEDURE — 36415 COLL VENOUS BLD VENIPUNCTURE: CPT | Performed by: REGISTERED NURSE

## 2018-11-11 PROCEDURE — 86900 BLOOD TYPING SEROLOGIC ABO: CPT | Performed by: REGISTERED NURSE

## 2018-11-11 PROCEDURE — 12000031 ZZH R&B OB CRITICAL

## 2018-11-11 PROCEDURE — 25000132 ZZH RX MED GY IP 250 OP 250 PS 637: Performed by: REGISTERED NURSE

## 2018-11-11 PROCEDURE — 86850 RBC ANTIBODY SCREEN: CPT | Performed by: REGISTERED NURSE

## 2018-11-11 PROCEDURE — 86780 TREPONEMA PALLIDUM: CPT | Performed by: REGISTERED NURSE

## 2018-11-11 RX ORDER — FENTANYL CITRATE 50 UG/ML
50-100 INJECTION, SOLUTION INTRAMUSCULAR; INTRAVENOUS
Status: DISCONTINUED | OUTPATIENT
Start: 2018-11-11 | End: 2018-11-12

## 2018-11-11 RX ORDER — ONDANSETRON 2 MG/ML
4 INJECTION INTRAMUSCULAR; INTRAVENOUS EVERY 6 HOURS PRN
Status: DISCONTINUED | OUTPATIENT
Start: 2018-11-11 | End: 2018-11-12

## 2018-11-11 RX ORDER — TERBUTALINE SULFATE 1 MG/ML
0.25 INJECTION, SOLUTION SUBCUTANEOUS
Status: DISCONTINUED | OUTPATIENT
Start: 2018-11-11 | End: 2018-11-12

## 2018-11-11 RX ORDER — CARBOPROST TROMETHAMINE 250 UG/ML
250 INJECTION, SOLUTION INTRAMUSCULAR
Status: DISCONTINUED | OUTPATIENT
Start: 2018-11-11 | End: 2018-11-12

## 2018-11-11 RX ORDER — ACETAMINOPHEN 325 MG/1
650 TABLET ORAL EVERY 4 HOURS PRN
Status: DISCONTINUED | OUTPATIENT
Start: 2018-11-11 | End: 2018-11-12

## 2018-11-11 RX ORDER — NALOXONE HYDROCHLORIDE 0.4 MG/ML
.1-.4 INJECTION, SOLUTION INTRAMUSCULAR; INTRAVENOUS; SUBCUTANEOUS
Status: DISCONTINUED | OUTPATIENT
Start: 2018-11-11 | End: 2018-11-12

## 2018-11-11 RX ORDER — METHYLERGONOVINE MALEATE 0.2 MG/ML
200 INJECTION INTRAVENOUS
Status: DISCONTINUED | OUTPATIENT
Start: 2018-11-11 | End: 2018-11-12

## 2018-11-11 RX ORDER — OXYTOCIN/0.9 % SODIUM CHLORIDE 30/500 ML
100-340 PLASTIC BAG, INJECTION (ML) INTRAVENOUS CONTINUOUS PRN
Status: COMPLETED | OUTPATIENT
Start: 2018-11-11 | End: 2018-11-12

## 2018-11-11 RX ORDER — HYDROXYZINE HYDROCHLORIDE 50 MG/1
100 TABLET, FILM COATED ORAL ONCE
Status: COMPLETED | OUTPATIENT
Start: 2018-11-11 | End: 2018-11-11

## 2018-11-11 RX ORDER — OXYCODONE AND ACETAMINOPHEN 5; 325 MG/1; MG/1
1 TABLET ORAL
Status: DISCONTINUED | OUTPATIENT
Start: 2018-11-11 | End: 2018-11-12

## 2018-11-11 RX ORDER — OXYTOCIN 10 [USP'U]/ML
10 INJECTION, SOLUTION INTRAMUSCULAR; INTRAVENOUS
Status: DISCONTINUED | OUTPATIENT
Start: 2018-11-11 | End: 2018-11-12

## 2018-11-11 RX ORDER — SODIUM CHLORIDE, SODIUM LACTATE, POTASSIUM CHLORIDE, CALCIUM CHLORIDE 600; 310; 30; 20 MG/100ML; MG/100ML; MG/100ML; MG/100ML
INJECTION, SOLUTION INTRAVENOUS CONTINUOUS
Status: DISCONTINUED | OUTPATIENT
Start: 2018-11-11 | End: 2018-11-12

## 2018-11-11 RX ORDER — IBUPROFEN 400 MG/1
800 TABLET, FILM COATED ORAL
Status: DISCONTINUED | OUTPATIENT
Start: 2018-11-11 | End: 2018-11-12

## 2018-11-11 RX ADMIN — DINOPROSTONE 10 MG: 10 INSERT VAGINAL at 20:45

## 2018-11-11 RX ADMIN — HYDROXYZINE HYDROCHLORIDE 100 MG: 50 TABLET, FILM COATED ORAL at 22:41

## 2018-11-11 NOTE — IP AVS SNAPSHOT
[address]    [place] [address]    Phone:  [phone]                                       After Visit Summary   11/11/2018    Katia Rizvi    MRN: [id number]           After Visit Summary Signature Page     I have received my discharge instructions, and my questions have been answered. I have discussed any challenges I see with this plan with the nurse or doctor.    ..........................................................................................................................................  Patient/Patient Representative Signature      ..........................................................................................................................................  Patient Representative Print Name and Relationship to Patient    ..................................................               ................................................  Date                                   Time    ..........................................................................................................................................  Reviewed by Signature/Title    ...................................................              ..............................................  Date                                               Time          22EPIC Rev 08/18

## 2018-11-11 NOTE — IP AVS SNAPSHOT
MRN:[id number]                      After Visit Summary   11/11/2018    Katia Rizvi    MRN: [id number]           Thank you!     Thank you for choosing [facility] for your care. Our goal is always to provide you with excellent care. Hearing back from our patients is one way we can continue to improve our services. Please take a few minutes to complete the written survey that you may receive in the mail after you visit with us. Thank you!        Patient Information     Date Of Birth          1995        Designated Caregiver       Most Recent Value    Caregiver    Name of designated caregiver self    Phone number of caregiver self      About your hospital stay     You were admitted on:  November 11, 2018 You last received care in the:  [address]    You were discharged on:  November 14, 2018       Who to Call     For medical emergencies, please call 911.  For non-urgent questions about your medical care, please call your primary care provider or clinic, [phone]          Attending Provider     Provider Specialty    Lisette Zepeda APRN CNM [facility]    Gavino Hood MD OB/Gyn       Primary Care Provider Office Phone # Fax #    JOE Goins [phone]      After Care Instructions     Activity       Review discharge instructions            Diet       Resume previous diet            Discharge Instructions - Postpartum visit       Schedule postpartum visit with your provider and return to clinic in 6 weeks.                  Further instructions from your care team       Postpartum Vaginal Delivery Instructions    Activity       Ask family and friends for help when you need it.    Do not place anything in your vagina for 6 weeks.    You are not restricted on other activities, but take it easy for a few weeks to allow your body to recover from delivery.  You are able to do any activities you feel up to that point.    No  driving until you have stopped taking your pain medications (usually two weeks after delivery).     Call your health care provider if you have any of these symptoms:       Increased pain, swelling, redness, or fluid around your stiches from an episiotomy or perineal tear.    A fever above 100.4 F (38 C) with or without chills when placing a thermometer under your tongue.    You soak a sanitary pad with blood within 1 hour, or you see blood clots larger than a golf ball.    Bleeding that lasts more than 6 weeks.    Vaginal discharge that smells bad.    Severe pain, cramping or tenderness in your lower belly area.    A need to urinate more frequently (use the toilet more often), more urgently (use the toilet very quickly), or it burns when you urinate.    Nausea and vomiting.    Redness, swelling or pain around a vein in your leg.    Problems breastfeeding or a red or painful area on your breast.    Chest pain and cough or are gasping for air.    Problems coping with sadness, anxiety, or depression.  If you have any concerns about hurting yourself or the baby, call your provider immediately.     You have questions or concerns after you return home.     Keep your hands clean:  Always wash your hands before touching your perineal area and stitches.  This helps reduce your risk of infection.  If your hands aren't dirty, you may use an alcohol hand-rub to clean your hands. Keep your nails clean and short.        Pending Results     No orders found from 11/9/2018 to 11/12/2018.            Statement of Approval     Ordered          11/14/18 0826  I have reviewed and agree with all the recommendations and orders detailed in this document.  EFFECTIVE NOW     Approved and electronically signed by:  Heide Cee MD             Admission Information     Date & Time Provider Department Dept. Phone    11/11/2018 Gavino Hood MD [address] [phone]      Your Vitals Were     Blood  "Pressure Pulse Temperature Respirations Pulse Oximetry       105/59 92 97.7  F (36.5  C) (Oral) 16 100%       MyChart Information     [employer] lets you send messages to your doctor, view your test results, renew your prescriptions, schedule appointments and more. To sign up, go to [email or web address] . Click on \"Log in\" on the left side of the screen, which will take you to the Welcome page. Then click on \"Sign up Now\" on the right side of the page.     You will be asked to enter the access code listed below, as well as some personal information. Please follow the directions to create your username and password.     Your access code is: [email or web address]  Expires: 2018  4:48 PM     Your access code will  in 90 days. If you need help or a new code, please call your [facility] clinic or [phone].        Care EveryWhere ID     This is your Care EveryWhere ID. This could be used by other organizations to access your [facility] medical records  [id number]        Equal Access to Services     [facility]: Hadii eddie Samuel, waezekiel allen, qaybraven barnettaloscar do, james mackay . So [facility] [phone].    ATENCIÓN: Si habla español, tiene a kiran disposición servicios gratuitos de asistencia lingüística. Hannah al [phone].    We comply with applicable federal civil rights laws and [place] laws. We do not discriminate on the basis of race, color, national origin, age, disability, sex, sexual orientation, or gender identity.               Review of your medicines      CONTINUE these medicines which may have CHANGED, or have new prescriptions. If we are uncertain of the size of tablets/capsules you have at home, strength may be listed as something that might have changed.        Dose / Directions    vitamin D3 2000 units Caps   This may have changed:  Another medication with the same name was removed. Continue taking this medication, and follow the directions you see " here.        Dose:  2000 Units   Take 2,000 Units by mouth daily   Refills:  0         CONTINUE these medicines which have NOT CHANGED        Dose / Directions    [employer] PRENATAL 28-0.8 MG Tabs        Dose:  1 tablet   Take 1 tablet by mouth daily   Refills:  0       IRON SUPPLEMENT PO        Dose:  325 mg   Take 325 mg by mouth   Refills:  0       RANITIDINE HCL PO        Refills:  0         STOP taking     PRENATAL 1 PO                    Protect others around you: Learn how to safely use, store and throw away your medicines at www.disposemymeds.org.             Medication List: This is a list of all your medications and when to take them. Check marks below indicate your daily home schedule. Keep this list as a reference.      Medications           Morning Afternoon Evening Bedtime As Needed    [employer] PRENATAL 28-0.8 MG Tabs   Take 1 tablet by mouth daily                                IRON SUPPLEMENT PO   Take 325 mg by mouth                                RANITIDINE HCL PO                                vitamin D3 2000 units Caps   Take 2,000 Units by mouth daily

## 2018-11-12 ENCOUNTER — ANESTHESIA EVENT (OUTPATIENT)
Dept: OBGYN | Facility: CLINIC | Age: 23
End: 2018-11-12

## 2018-11-12 ENCOUNTER — ANESTHESIA (OUTPATIENT)
Dept: OBGYN | Facility: CLINIC | Age: 23
End: 2018-11-12

## 2018-11-12 LAB — T PALLIDUM AB SER QL: NONREACTIVE

## 2018-11-12 PROCEDURE — 25000128 H RX IP 250 OP 636: Performed by: ANESTHESIOLOGY

## 2018-11-12 PROCEDURE — 25000125 ZZHC RX 250: Performed by: REGISTERED NURSE

## 2018-11-12 PROCEDURE — 25000125 ZZHC RX 250

## 2018-11-12 PROCEDURE — 0HQ9XZZ REPAIR PERINEUM SKIN, EXTERNAL APPROACH: ICD-10-PCS | Performed by: OBSTETRICS & GYNECOLOGY

## 2018-11-12 PROCEDURE — 25000125 ZZHC RX 250: Performed by: ANESTHESIOLOGY

## 2018-11-12 PROCEDURE — 00HU33Z INSERTION OF INFUSION DEVICE INTO SPINAL CANAL, PERCUTANEOUS APPROACH: ICD-10-PCS | Performed by: ANESTHESIOLOGY

## 2018-11-12 PROCEDURE — 37000011 ZZH ANESTHESIA WARD SERVICE

## 2018-11-12 PROCEDURE — 25000128 H RX IP 250 OP 636

## 2018-11-12 PROCEDURE — 3E0R3BZ INTRODUCTION OF ANESTHETIC AGENT INTO SPINAL CANAL, PERCUTANEOUS APPROACH: ICD-10-PCS | Performed by: ANESTHESIOLOGY

## 2018-11-12 PROCEDURE — 12000037 ZZH R&B POSTPARTUM INTERMEDIATE

## 2018-11-12 PROCEDURE — 25000128 H RX IP 250 OP 636: Performed by: REGISTERED NURSE

## 2018-11-12 PROCEDURE — 10907ZC DRAINAGE OF AMNIOTIC FLUID, THERAPEUTIC FROM PRODUCTS OF CONCEPTION, VIA NATURAL OR ARTIFICIAL OPENING: ICD-10-PCS | Performed by: OBSTETRICS & GYNECOLOGY

## 2018-11-12 PROCEDURE — 72200001 ZZH LABOR CARE VAGINAL DELIVERY SINGLE

## 2018-11-12 PROCEDURE — 25000132 ZZH RX MED GY IP 250 OP 250 PS 637: Performed by: OBSTETRICS & GYNECOLOGY

## 2018-11-12 PROCEDURE — 27110038 ZZH RX 271: Performed by: ANESTHESIOLOGY

## 2018-11-12 RX ORDER — ROPIVACAINE HYDROCHLORIDE 2 MG/ML
10 INJECTION, SOLUTION EPIDURAL; INFILTRATION; PERINEURAL ONCE
Status: COMPLETED | OUTPATIENT
Start: 2018-11-12 | End: 2018-11-12

## 2018-11-12 RX ORDER — OXYTOCIN 10 [USP'U]/ML
10 INJECTION, SOLUTION INTRAMUSCULAR; INTRAVENOUS
Status: DISCONTINUED | OUTPATIENT
Start: 2018-11-12 | End: 2018-11-14 | Stop reason: HOSPADM

## 2018-11-12 RX ORDER — OXYTOCIN/0.9 % SODIUM CHLORIDE 30/500 ML
340 PLASTIC BAG, INJECTION (ML) INTRAVENOUS CONTINUOUS PRN
Status: DISCONTINUED | OUTPATIENT
Start: 2018-11-12 | End: 2018-11-14 | Stop reason: HOSPADM

## 2018-11-12 RX ORDER — LIDOCAINE 40 MG/G
CREAM TOPICAL
Status: DISCONTINUED | OUTPATIENT
Start: 2018-11-12 | End: 2018-11-12

## 2018-11-12 RX ORDER — ONDANSETRON 4 MG/1
4 TABLET, ORALLY DISINTEGRATING ORAL EVERY 6 HOURS PRN
Status: DISCONTINUED | OUTPATIENT
Start: 2018-11-12 | End: 2018-11-12

## 2018-11-12 RX ORDER — CEFAZOLIN SODIUM IN 0.9 % NACL 3 G/100 ML
3 INTRAVENOUS SOLUTION, PIGGYBACK (ML) INTRAVENOUS ONCE
Status: COMPLETED | OUTPATIENT
Start: 2018-11-12 | End: 2018-11-12

## 2018-11-12 RX ORDER — TERBUTALINE SULFATE 1 MG/ML
0.25 INJECTION, SOLUTION SUBCUTANEOUS
Status: DISCONTINUED | OUTPATIENT
Start: 2018-11-12 | End: 2018-11-12

## 2018-11-12 RX ORDER — LANOLIN 100 %
OINTMENT (GRAM) TOPICAL
Status: DISCONTINUED | OUTPATIENT
Start: 2018-11-12 | End: 2018-11-14 | Stop reason: HOSPADM

## 2018-11-12 RX ORDER — IBUPROFEN 400 MG/1
800 TABLET, FILM COATED ORAL EVERY 6 HOURS PRN
Status: DISCONTINUED | OUTPATIENT
Start: 2018-11-12 | End: 2018-11-14 | Stop reason: HOSPADM

## 2018-11-12 RX ORDER — NALOXONE HYDROCHLORIDE 0.4 MG/ML
.1-.4 INJECTION, SOLUTION INTRAMUSCULAR; INTRAVENOUS; SUBCUTANEOUS
Status: DISCONTINUED | OUTPATIENT
Start: 2018-11-12 | End: 2018-11-14 | Stop reason: HOSPADM

## 2018-11-12 RX ORDER — OXYTOCIN/0.9 % SODIUM CHLORIDE 30/500 ML
1-24 PLASTIC BAG, INJECTION (ML) INTRAVENOUS CONTINUOUS
Status: DISCONTINUED | OUTPATIENT
Start: 2018-11-12 | End: 2018-11-12

## 2018-11-12 RX ORDER — AMOXICILLIN 250 MG
1 CAPSULE ORAL 2 TIMES DAILY
Status: DISCONTINUED | OUTPATIENT
Start: 2018-11-12 | End: 2018-11-14 | Stop reason: HOSPADM

## 2018-11-12 RX ORDER — FENTANYL CITRATE 50 UG/ML
100 INJECTION, SOLUTION INTRAMUSCULAR; INTRAVENOUS ONCE
Status: DISCONTINUED | OUTPATIENT
Start: 2018-11-12 | End: 2018-11-12

## 2018-11-12 RX ORDER — CEFAZOLIN SODIUM IN 0.9 % NACL 3 G/100 ML
INTRAVENOUS SOLUTION, PIGGYBACK (ML) INTRAVENOUS
Status: COMPLETED
Start: 2018-11-12 | End: 2018-11-12

## 2018-11-12 RX ORDER — ROPIVACAINE HYDROCHLORIDE 2 MG/ML
INJECTION, SOLUTION EPIDURAL; INFILTRATION; PERINEURAL
Status: COMPLETED
Start: 2018-11-12 | End: 2018-11-12

## 2018-11-12 RX ORDER — HYDROCORTISONE 2.5 %
CREAM (GRAM) TOPICAL 3 TIMES DAILY PRN
Status: DISCONTINUED | OUTPATIENT
Start: 2018-11-12 | End: 2018-11-14 | Stop reason: HOSPADM

## 2018-11-12 RX ORDER — ONDANSETRON 2 MG/ML
4 INJECTION INTRAMUSCULAR; INTRAVENOUS EVERY 6 HOURS PRN
Status: DISCONTINUED | OUTPATIENT
Start: 2018-11-12 | End: 2018-11-12

## 2018-11-12 RX ORDER — BISACODYL 10 MG
10 SUPPOSITORY, RECTAL RECTAL DAILY PRN
Status: DISCONTINUED | OUTPATIENT
Start: 2018-11-14 | End: 2018-11-14 | Stop reason: HOSPADM

## 2018-11-12 RX ORDER — NALBUPHINE HYDROCHLORIDE 10 MG/ML
2.5-5 INJECTION, SOLUTION INTRAMUSCULAR; INTRAVENOUS; SUBCUTANEOUS EVERY 6 HOURS PRN
Status: DISCONTINUED | OUTPATIENT
Start: 2018-11-12 | End: 2018-11-12

## 2018-11-12 RX ORDER — ACETAMINOPHEN 325 MG/1
650 TABLET ORAL EVERY 4 HOURS PRN
Status: DISCONTINUED | OUTPATIENT
Start: 2018-11-12 | End: 2018-11-14 | Stop reason: HOSPADM

## 2018-11-12 RX ORDER — OXYTOCIN/0.9 % SODIUM CHLORIDE 30/500 ML
100 PLASTIC BAG, INJECTION (ML) INTRAVENOUS CONTINUOUS
Status: DISCONTINUED | OUTPATIENT
Start: 2018-11-12 | End: 2018-11-14 | Stop reason: HOSPADM

## 2018-11-12 RX ORDER — EPHEDRINE SULFATE 50 MG/ML
INJECTION, SOLUTION INTRAMUSCULAR; INTRAVENOUS; SUBCUTANEOUS
Status: COMPLETED
Start: 2018-11-12 | End: 2018-11-12

## 2018-11-12 RX ORDER — EPHEDRINE SULFATE 50 MG/ML
5 INJECTION, SOLUTION INTRAMUSCULAR; INTRAVENOUS; SUBCUTANEOUS
Status: COMPLETED | OUTPATIENT
Start: 2018-11-12 | End: 2018-11-12

## 2018-11-12 RX ORDER — NALOXONE HYDROCHLORIDE 0.4 MG/ML
.1-.4 INJECTION, SOLUTION INTRAMUSCULAR; INTRAVENOUS; SUBCUTANEOUS
Status: DISCONTINUED | OUTPATIENT
Start: 2018-11-12 | End: 2018-11-12

## 2018-11-12 RX ORDER — AMOXICILLIN 250 MG
2 CAPSULE ORAL 2 TIMES DAILY
Status: DISCONTINUED | OUTPATIENT
Start: 2018-11-12 | End: 2018-11-14 | Stop reason: HOSPADM

## 2018-11-12 RX ADMIN — OXYTOCIN-SODIUM CHLORIDE 0.9% IV SOLN 30 UNIT/500ML 340 ML/HR: 30-0.9/5 SOLUTION at 19:55

## 2018-11-12 RX ADMIN — Medication 5 MG: at 09:57

## 2018-11-12 RX ADMIN — Medication 5 MG: at 04:38

## 2018-11-12 RX ADMIN — ROPIVACAINE HYDROCHLORIDE 10 ML: 2 INJECTION, SOLUTION EPIDURAL; INFILTRATION at 04:18

## 2018-11-12 RX ADMIN — Medication 5 MG: at 07:07

## 2018-11-12 RX ADMIN — Medication 3 G: at 20:02

## 2018-11-12 RX ADMIN — SODIUM CHLORIDE, POTASSIUM CHLORIDE, SODIUM LACTATE AND CALCIUM CHLORIDE: 600; 310; 30; 20 INJECTION, SOLUTION INTRAVENOUS at 05:09

## 2018-11-12 RX ADMIN — Medication 12 ML/HR: at 04:30

## 2018-11-12 RX ADMIN — IBUPROFEN 800 MG: 400 TABLET ORAL at 21:29

## 2018-11-12 RX ADMIN — SODIUM CHLORIDE, POTASSIUM CHLORIDE, SODIUM LACTATE AND CALCIUM CHLORIDE: 600; 310; 30; 20 INJECTION, SOLUTION INTRAVENOUS at 08:24

## 2018-11-12 RX ADMIN — Medication 5 MG: at 07:11

## 2018-11-12 RX ADMIN — FENTANYL CITRATE 100 MCG: 50 INJECTION INTRAMUSCULAR; INTRAVENOUS at 03:28

## 2018-11-12 RX ADMIN — Medication 5 MG: at 07:28

## 2018-11-12 RX ADMIN — SODIUM CHLORIDE, POTASSIUM CHLORIDE, SODIUM LACTATE AND CALCIUM CHLORIDE 500 ML: 600; 310; 30; 20 INJECTION, SOLUTION INTRAVENOUS at 04:35

## 2018-11-12 RX ADMIN — SODIUM CHLORIDE, POTASSIUM CHLORIDE, SODIUM LACTATE AND CALCIUM CHLORIDE: 600; 310; 30; 20 INJECTION, SOLUTION INTRAVENOUS at 15:13

## 2018-11-12 RX ADMIN — ACETAMINOPHEN 650 MG: 325 TABLET, FILM COATED ORAL at 21:29

## 2018-11-12 RX ADMIN — OXYTOCIN-SODIUM CHLORIDE 0.9% IV SOLN 30 UNIT/500ML 2 MILLI-UNITS/MIN: 30-0.9/5 SOLUTION at 09:31

## 2018-11-12 RX ADMIN — SODIUM CHLORIDE, POTASSIUM CHLORIDE, SODIUM LACTATE AND CALCIUM CHLORIDE 1000 ML: 600; 310; 30; 20 INJECTION, SOLUTION INTRAVENOUS at 02:50

## 2018-11-12 RX ADMIN — Medication 5 MG: at 04:47

## 2018-11-12 RX ADMIN — SODIUM CHLORIDE, POTASSIUM CHLORIDE, SODIUM LACTATE AND CALCIUM CHLORIDE: 600; 310; 30; 20 INJECTION, SOLUTION INTRAVENOUS at 18:33

## 2018-11-12 NOTE — PLAN OF CARE
11/11/2018 @ 1935- Primip admitted to L&D room 214 for IOL due to post dates.  Maternal history of obesity with BMI >50, anxiety and depression not treated with medication or treatment.  Discussed POC for cervidil, labs, orders for diet, activity, labor pain managements and sleep aid, fetal monitoring.  Denies questions, agrees with plan of care.  Significant other at bedside, supportive.    11/12/2018- patient continues to have contractions, questions if cervidil is coming out.  SVE done- cervidil remains in vagina although it had moved lower into vaginal vault- replaced to original placement spot.  Denies needs at this time.  Encourage rest.

## 2018-11-12 NOTE — PROGRESS NOTES
OB Progress Note  2018  5:22 PM   (Late entry from 3pm)    S:  Comfortable with epidural, feeling mild pressure.      O:  /62  Temp 99.1  F (37.3  C)  Resp 16  SpO2 96%  EFM: baseline 145, accelerations present, absent decelerations, moderate variability; Category I  [place]:  Ctx q2-5 min  SVE:  5.5/90%/-2  Membranes: ruptured  IUPC & FSE in place    Pitocin at 11 mU/min    A/P:  23 year old  @41w0d admitte  d for post-term IOL, BMI = 50  1.  Routine cares  2.  Minimal cervical change over past 2-3hrs, still not adequate MVU's, continue to increase Pitocin as tolerated  3.  Anticipate     Eryn Miner

## 2018-11-12 NOTE — ANESTHESIA PROCEDURE NOTES
Peripheral nerve/Neuraxial procedure note : epidural catheter  Pre-Procedure  Performed by ANGELO AMIN  Location: OB      Pre-Anesthestic Checklist: patient identified, risks and benefits discussed, informed consent, pre-op evaluation and at physician/surgeon's request    Timeout  Correct Patient: Yes   Correct Procedure: Yes   Correct Site: Yes   Correct Laterality: N/A   Correct Position: Yes   Site Marked: N/A   .   Procedure Documentation    .    Procedure:    Epidural catheter.  Insertion Site:L4-5  (midline approach) Injection technique: LORT saline   Local skin infiltrated with 3 mL of 1% lidocaine.       Patient Prep;mask, sterile gloves, povidone-iodine 7.5% surgical scrub, patient draped.  .  Needle: Touhy needle Needle Gauge: 17.    Needle Length (Inches) 3.5  # of attempts: 1 and # of redirects:  .   Catheter: 19 G . .  Catheter threaded easily  3 cm epidural space.  .   .    Assessment/Narrative  Paresthesias: No.  .  .  Aspiration negative for heme or CSF  . Test dose of 3 mL lidocaine 1.5% w/ 1:200,000 epinephrine at. Test dose negative for signs of intravascular, subdural or intrathecal injection. Comments:  No blood or complications.  Secured with adhesive spray, tegaderm, and tape.    She had recently had IV fentanyl.
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
## 2018-11-12 NOTE — ANESTHESIA PREPROCEDURE EVALUATION
Anesthesia Evaluation     .             ROS/MED HX    ENT/Pulmonary:      (-) sleep apnea   Neurologic: Comment: CLBP      Cardiovascular:         METS/Exercise Tolerance:     Hematologic:         Musculoskeletal:         GI/Hepatic:     (+) GERD cholecystitis/cholelithiasis,       Renal/Genitourinary:         Endo:     (+) Obesity, .      Psychiatric:     (+) psychiatric history anxiety and depression      Infectious Disease:         Malignancy:         Other:                                    Anesthesia Plan      History & Physical Review      ASA Status:  .  OB Epidural Asa: 3            Postoperative Care      Consents  Anesthetic plan, risks, benefits and alternatives discussed with:  Patient..                          .

## 2018-11-12 NOTE — PLAN OF CARE
EDY paged for epidural at 0345. Dr. Jovany SNOW in room at 0355. Medications, Ropivacaine, handed off to EDY at this time.  Time out performed.  Pt sat up at edge of bed. EFM off during procedure. Pt tolerated procedure well. Into left lateral position at 0435, EFM reapplied. BP set for every 2 minutes with continuous pulse oximeter in place.

## 2018-11-12 NOTE — PLAN OF CARE
0223- J Duane WLIDE called and notified of SVE, patient discomfort and inability to trace fetal heart rate.  Plans to come in, place FSE and get an epidural.

## 2018-11-12 NOTE — PROGRESS NOTES
OB Progress Note  2018  3:03 AM    S:  RN paged CNM to report cervical change, but difficulty capturing FHTs due to elevated BMI.  Pt coping with painful ctxs by breathing and vocalizing. Requesting epidural.       O:  /70  Temp 98.2  F (36.8  C) (Temporal)  Resp 16  EFM: Category 1  [place]:  Ctx mostly q2-3min, but can space out to 8.   SVE:  3/90%/-2  Membranes: AROM, clear fluid. FSE placed. Fetus tolerated procedure well.       A/P:  23 year old  @41w0d admitted for IOL for Post-dates and maternal obesity.   1.  Routine cares  2.  Epidural anesthesia. Pitocin may be started if ctx pace slows or no progress is made.   3.  Anticipate     Lisette Zepeda

## 2018-11-13 LAB — HGB BLD-MCNC: 8.8 G/DL (ref 11.7–15.7)

## 2018-11-13 PROCEDURE — 12000037 ZZH R&B POSTPARTUM INTERMEDIATE

## 2018-11-13 PROCEDURE — 85018 HEMOGLOBIN: CPT | Performed by: OBSTETRICS & GYNECOLOGY

## 2018-11-13 PROCEDURE — 25000132 ZZH RX MED GY IP 250 OP 250 PS 637: Performed by: OBSTETRICS & GYNECOLOGY

## 2018-11-13 PROCEDURE — 90707 MMR VACCINE SC: CPT | Performed by: OBSTETRICS & GYNECOLOGY

## 2018-11-13 PROCEDURE — 36415 COLL VENOUS BLD VENIPUNCTURE: CPT | Performed by: OBSTETRICS & GYNECOLOGY

## 2018-11-13 PROCEDURE — 25000128 H RX IP 250 OP 636: Performed by: OBSTETRICS & GYNECOLOGY

## 2018-11-13 RX ADMIN — SENNOSIDES AND DOCUSATE SODIUM 1 TABLET: 8.6; 5 TABLET ORAL at 19:54

## 2018-11-13 RX ADMIN — IBUPROFEN 800 MG: 400 TABLET ORAL at 15:40

## 2018-11-13 RX ADMIN — ACETAMINOPHEN 650 MG: 325 TABLET, FILM COATED ORAL at 22:05

## 2018-11-13 RX ADMIN — ACETAMINOPHEN 650 MG: 325 TABLET, FILM COATED ORAL at 09:28

## 2018-11-13 RX ADMIN — ACETAMINOPHEN 650 MG: 325 TABLET, FILM COATED ORAL at 03:38

## 2018-11-13 RX ADMIN — ACETAMINOPHEN 650 MG: 325 TABLET, FILM COATED ORAL at 15:40

## 2018-11-13 RX ADMIN — IBUPROFEN 800 MG: 400 TABLET ORAL at 03:38

## 2018-11-13 RX ADMIN — MEASLES, MUMPS, AND RUBELLA VIRUS VACCINE LIVE 0.5 ML: 1000; 12500; 1000 INJECTION, POWDER, LYOPHILIZED, FOR SUSPENSION SUBCUTANEOUS at 15:40

## 2018-11-13 RX ADMIN — IBUPROFEN 800 MG: 400 TABLET ORAL at 22:05

## 2018-11-13 RX ADMIN — SENNOSIDES AND DOCUSATE SODIUM 1 TABLET: 8.6; 5 TABLET ORAL at 07:43

## 2018-11-13 RX ADMIN — IBUPROFEN 800 MG: 400 TABLET ORAL at 09:28

## 2018-11-13 NOTE — LACTATION NOTE
Initial Lactation visit. Hand out given. Recommend unlimited, frequent breast feedings: At least 8 - 12 times every 24 hours. Avoid pacifiers and supplementation with formula unless medically indicated. Explained benefits of holding baby skin on skin to help promote better breastfeeding outcomes.     Infant latched and fed well during visit. Questions answered about pumping. Feeding log explained. Encouraged Katia to continue calling staff for latch checks and assist with feedings as needed. Katia appreciative of my visit. Will revisit as needed.     Evangelina Guevara RN IBCLC

## 2018-11-13 NOTE — L&D DELIVERY NOTE
Delivery Date:  2018      DATE OF DELIVERY:  2018.      ANTEPARTUM DIAGNOSES:  Intrauterine pregnancy 40 weeks 5 days gestation, maternal obesity, postdates.  Group B strep negative.      POSTPARTUM DIAGNOSES:  Status post amniotomy and Pitocin-induced normal spontaneous vaginal delivery, infant female, 7 pounds 1 ounce, Apgars 4, 9 and 9.  Right labial mucosal laceration, repaired.  First-degree vaginal laceration, repaired.  Spontaneous placental passage, intact.      PATIENT IDENTIFICATION:  Katia Rizvi is a 23-year-old woman,  2, para 0-0-1-0 at 40 weeks and 5 days gestation who is admitted to [facility] for cervical ripening followed by induction of labor.  The patient was followed at our office since 14 weeks' gestation.  Her prenatal course was complicated by maternal obesity with the patient's BMI being over 50.  She did have a normal glucose screening and there was no gestational diabetes.  The patient also had anemia during the pregnancy with hemoglobins between 9 and 10 grams percent.  She does have a history of gallstones and had 2 episodes of pain related to the gallstones.  Gallbladder removal following delivery is recommended if the pain persists.  The baby was also in a breech presentation at 34 weeks, but spontaneously verted to vertex by 36 weeks.  The patient progressed during the pregnancy and to 5 days beyond the due date.  Given the patient's obesity and the concern regarding morbid complications, the decision was made to induce labor at 40-5/7 weeks.  The cervix was unfavorable being 1 cm, 50%, vertex -3.  The patient was therefore scheduled for cervical ripening on 2018 with anticipated induction of labor on 2018.  The patient was fully informed regarding the pros, cons, risks, benefits and potential complications of induction of labor.  She expressed understanding and agreement with the plan of care.      HOSPITAL COURSE:  The patient was admitted on  the evening of 11/11/2018.  Her vital signs were normal and there was no evidence of preeclampsia.  Fetal heart tones were category 1 at 130 beats per minute, moderate variability accelerations were present.  The cervix was 1 cm, 50%, vertex -3.  Cervidil was placed to ripen the cervix.  The patient then began having some spontaneous uterine activity such that by 0300 hours the cervix was 3 cm, 75%, vertex -3.  The patient was also spontaneously leaking fluid and the fluid was meconium-stained.  Fetal heart tones remained category 1.  The contractions increased in severity and intensity.  By 0500 hours, the patient received an epidural anesthetic for pain relief.  By 0600 hours the cervix was 4 cm, 90%, vertex -2.  When her contractions somewhat spaced out, intravenous oxytocin was initiated by 1000 hours.  The Pitocin reached a maximum of 4 milliunits per minute.  At 1400 hours, the cervix was 5-6 cm, 95% effaced, vertex -2.  The patient had excellent analgesia from the epidural.  Her vital signs remained normal and the fetal heart rate tracing was category 1.  By 1800 hours the cervix was rim dilated and the patient was completely dilated and completely effaced when I examined her at approximately 1845 hours.  The patient had been managed by Dr. Miner during the day of 11/12/2018 and the patient's labor care was transferred to Belen Platt, certified nurse midwife.  However, when the baby began having some fairly deep variable decelerations, some of which were described as late decelerations, I was called from our [place] office to [facility] to evaluate the patient and the fetal heart rate tracing.  I arrived shortly thereafter and examined the patient and determined that the cervix was completely dilated and completely effaced.  The baby was having some deep variable decelerations, mostly occurring with the contraction and end of the timing of an early deceleration.  There were also some rare  decelerations that had a late component.  The variability was moderate and the tracing was otherwise reassuring.  It was apparent that there was cord compression at some level.  Due to the fetal heart rate changes, Belen Platt did turn the patient's care over to myself.  I discussed with the couple at some length the fact that there were some deep variable decelerations in a pattern consistent with cord compression and that there likely was either a nuchal cord or cord around the body.  The patient was confirmed to be completely dilated and completely effaced, and due to the fetal heart rate changes, after a brief period of laboring down, maternal expulsive efforts were begun.      SECOND STAGE OF LABOR:  The patient had become complete at approximately 1845 hours.  The vertex was at a 0 station.  Fetal heart tones were category 2 with moderate variability and a normal baseline, but there continued to be some variable decelerations, some down to as low as 80 beats per minute.  The fetal heart rate, however, returned to baseline immediately after the contraction.  These were felt to be early decelerations due to cord compression and likely a nuchal cord.  The patient had become completely dilated at approximately 1645 hours on 2018.  The early decelerations and occasional variable decelerations persisted.  I discussed with the patient and the father of the baby as well as the patient's family the plan to begin pushing after a brief period of laboring down.  I also informed the patient and her family of the potential for the need of a  section if the baby did not tolerate pushing during the second stage.  The patient expressed understanding and agreed that a vaginal delivery was desirable, but that a  section would be performed if there was evidence of fetal intolerance of the pushing phase.  I remained in the patient's room when she began pushing and throughout the entire second stage of  labor and for delivery.  The patient pushed rather effectively and the vertex did descend in the pelvis.  The decelerations actually resolved.  Due to a slight spacing out of contractions, intravenous oxytocin was used to augment the labor at a low dose of 2 milliunits per minute.  The baby did descend and after pushing for approximately 30 minutes, the baby came to a full crown.  Fetal heart tones were normal at 145 beats per minute with moderate variability.  No episiotomy was made.  The baby then delivered spontaneously in the LIZZY position.  It was evident that there was meconium-stained fluid and this was moderately thick.  A nuchal cord was present and was quite tight.  The umbilical cord was therefore clamped and cut on the perineum.  The baby was then delivered without shoulder dystocia and atraumatically.  The baby was immediately brought from the patient's perineum to the infant warmer.  There, the  nurse practitioner was in attendance and performed appropriate suctioning and assessment of the baby.  The product of the pregnancy was an infant female, 7 pounds 1 ounce with Apgars of 4, 9 and 9 at 1, 5 and 10 minutes respectively.  The baby was suctioned appropriately due to the meconium and was appropriately resuscitated.  The 1 minute Apgar score was a result of the tight nuchal cord and the delivery.  The baby, however, responded to minimal resuscitation and was in excellent condition at the time of this dictation.      THIRD STAGE OF LABOR:  After a 6-minute third stage of labor, the placenta delivered spontaneously, intact with a 3-vessel cord.  Examination of the perineum found a right labial mucosal laceration that was bleeding and therefore repaired.  Two interrupted sutures of 3-0 Vicryl were used to reapproximate the tissue and to achieve hemostasis.  There was also a first-degree vaginal laceration from the midpoint of the vagina to the introitus.  This was closed using a running locked  stitch of 3-0 Vicryl.  The uterine tone was excellent and the postpartum lochia was normal.  Intravenous oxytocin had been infused after delivery of the placenta.  The placenta appeared completely intact and there was no evidence of retained products of conception.  The mother and baby remained in the birthing room in excellent condition.  The baby required no resuscitation and due to the tight nuchal cord, the 1 minute Apgar score was 4.  The baby, however, responded with Apgars of 9 and 9 at 5 and 10 minutes respectively.  There was no evidence of postpartum hemorrhage, although the quantitative blood loss prior to weighing the sponges was 600 mL.  Uterine tone was excellent at the conclusion of the delivery and vaginal repair.      DELIVERY SUMMARY:  Pregnancy 40 weeks 5 days gestation, maternal obesity with BMI of 50, unfavorable cervix, status post induction of labor for postdates and maternal obesity, status post Cervidil cervical ripening followed by Pitocin augmentation and spontaneous rupture of membranes.  Status post normal spontaneous vaginal delivery, infant female, 7 pounds 1 ounce, Apgars 4, 9, 9 at 1, 5 and 10 minutes respectively.  The 1 minute Apgar score of 4 was indicative of the tight nuchal cord which was clamped and cut on the perineum.  Spontaneous placental passage, intact.  Right labial mucosal laceration, repaired.  First-degree vaginal laceration, repaired.  Group B strep negative.         GAVINO HOOD MD             D: 2018   T: 2018   MT: QUIN      Name:     MARILIA SEWELL   MRN:      [id number]        Account:        [id number]   :      1995        Delivery Date:  2018               Document: [id number]       cc: Gavino Hood MD

## 2018-11-13 NOTE — PLAN OF CARE
Problem: Patient Care Overview  Goal: Plan of Care/Patient Progress Review  Outcome: Improving  VSS, fundus firm, scant flow, voiding adequately, ambulating independently. Breastfeeding encouraged at least 8x in 24 hours, infant beginning to cluster feed, doing well. Pain well controlled with tylenol and ibuprofen. Will continue to monitor.

## 2018-11-13 NOTE — PROGRESS NOTES
[facility]   Post-Partum Progress Note        Interval History:   Doing well.  Pain is adequately controlled.  No fevers.  No history of foul-smelling vaginal discharge.  Good appetite.  Denies chest pain, shortness of breath, nausea or vomiting.  Vaginal bleeding is similar to a heavy menstrual flow.  Breastfeeding well.           Medications:   All medications related to the patient's surgery have been reviewed          Physical Exam:   All vitals stable  Blood pressure 101/63, pulse 97, temperature 97.4  F (36.3  C), temperature source Oral, resp. rate 16, SpO2 100 %  Uterine fundus is firm, non-tender and at the level of the umbilicus  Perineal sutures intact and wound healing well  Le non tender          Data:     Hemoglobin   Date Value Ref Range Status   11/13/2018 8.8 (L) 11.7 - 15.7 g/dL Final   11/11/2018 10.4 (L) 11.7 - 15.7 g/dL Final   09/04/2018 10.2 (L) 11.7 - 15.7 g/dL Final   07/18/2018 10.2 (L) 11.7 - 15.7 g/dL Final            Assessment and Plan:    Assessment:   Post-partum day #1  Normal spontaneous vaginal delivery  :     Doing well.  No excessive bleeding  Pain well-controlled.  Anemia- acute blood loss on chronic    Plan:   Ambulation encouraged  Pain control measures as needed  Anticipate discharge tomorrow  fe supplementation upon discharge     Kaur Puente

## 2018-11-13 NOTE — PLAN OF CARE
Pt assisted up to bathroom at 2105. Pt able to void. Pericare done by pt and taught by RN. Pt assisted back to bed. Pt transferred to room 404 in wheelchair at 2200. Infant transferred in mother's arms. Pt accompanied by significant other with belongings. Report given to Jaden NERI RN. Pt care endorsed in stable condition.

## 2018-11-13 NOTE — PROGRESS NOTES
OB Progress Note  2018  6:55 PM    Called to evaluate fetal heart tracing changes.  Tracing showing deep variable decelerations, some down to 80's-90's, mostly early in timing but a few with the appearance of late decelerations.  Variability remains moderate.  Patient is now complete.  Analgesia is excellent and the patient is comfortable.        O:  /65  Temp 99.3  F (37.4  C)  Resp 16  SpO2 100%  EFM: baseline 140, accelerations are present or absent, variable decelerations, moderate variability; Category II, but reassuring  [place]:  Ctx q1.5-3 min  SVE:  10/100%/0  Membranes: ruptured.  Scalp electrode and IUPC are in place.     Pitocin has been discontinued due to the fetal heart rate pattern.     A/P:  23 year old  @41w0d admitted for cervical ripening and IOL for maternal obesity.  Status post Cervidil cervical ripening, amniotomy and Pitocin induction.  Category II tracing with evidence of umbilical cord compression.    1.  Begin pushing  2.  Ongoing assessment of progress of descent and fetal tolerance of pushing in the second stage.   3.  The patient, the FOB and family were fully informed regarding the clinical status and status of the baby. The need for potential abdominal delivery by  section was discussed.  This poses significant risks with the patients body habitus.  In addition, the ability to perform a timely and emergent  section given the patients BMI was discussed.    4.  We will begin pushing with further measures to be determined given the status of the baby and progress of fetal descent and delivery.     Gavino Hood

## 2018-11-13 NOTE — ANESTHESIA POSTPROCEDURE EVALUATION
Patient: Katia Rizvi    * No procedures listed *    Diagnosis:* No pre-op diagnosis entered *  Diagnosis Additional Information: No value filed.    Anesthesia Type:  No value filed.    Note:  Anesthesia Post Evaluation    Patient location during evaluation: Bedside  Patient participation: Able to fully participate in evaluation  Level of consciousness: awake and alert  Pain management: adequate  Airway patency: patent  Cardiovascular status: hemodynamically stable  Respiratory status: nonlabored ventilation and unassisted  Hydration status: euvolemic  PONV: none       Comments: No complications. Patient is ambulatory, has voided, denies back pain.         Last vitals:  Vitals:    11/13/18 0030 11/13/18 0338 11/13/18 0928   BP: 114/64 101/63 113/62   Pulse: 92 97 90   Resp: 16 16 16   Temp: 36.9  C (98.4  F) 36.3  C (97.4  F) 36.6  C (97.8  F)   SpO2:            Electronically Signed By: Rickey Burt MD  November 13, 2018  4:08 PM

## 2018-11-13 NOTE — PLAN OF CARE
Problem: Patient Care Overview  Goal: Plan of Care/Patient Progress Review  Outcome: Improving  Baby and mother admitted from L&D, bands checked with RN upon arrival, mother and father oriented to room and educated about  safety procedures.     VSS, breastfeeding fair with latch assistance, pain well controlled with Tylenol and Ibuprofen, up independently with no complaints of dizziness, voiding adequately- IV removed,  at bedside, interacting and bonding well with infant, encouraged to call with questions or concerns, will continue to monitor.

## 2018-11-14 ENCOUNTER — DOCUMENTATION ONLY (OUTPATIENT)
Dept: CARE COORDINATION | Facility: CLINIC | Age: 23
End: 2018-11-14

## 2018-11-14 VITALS
HEART RATE: 92 BPM | DIASTOLIC BLOOD PRESSURE: 59 MMHG | RESPIRATION RATE: 16 BRPM | SYSTOLIC BLOOD PRESSURE: 105 MMHG | OXYGEN SATURATION: 100 % | TEMPERATURE: 97.7 F

## 2018-11-14 PROCEDURE — 25000132 ZZH RX MED GY IP 250 OP 250 PS 637: Performed by: OBSTETRICS & GYNECOLOGY

## 2018-11-14 RX ADMIN — SENNOSIDES AND DOCUSATE SODIUM 2 TABLET: 8.6; 5 TABLET ORAL at 08:28

## 2018-11-14 RX ADMIN — ACETAMINOPHEN 650 MG: 325 TABLET, FILM COATED ORAL at 04:00

## 2018-11-14 RX ADMIN — ACETAMINOPHEN 650 MG: 325 TABLET, FILM COATED ORAL at 09:58

## 2018-11-14 RX ADMIN — IBUPROFEN 800 MG: 400 TABLET ORAL at 04:00

## 2018-11-14 RX ADMIN — IBUPROFEN 800 MG: 400 TABLET ORAL at 09:59

## 2018-11-14 ASSESSMENT — ACTIVITIES OF DAILY LIVING (ADL)
BATHING: 0-->INDEPENDENT
FALL_HISTORY_WITHIN_LAST_SIX_MONTHS: NO
AMBULATION: 0-->INDEPENDENT
TRANSFERRING: 0-->INDEPENDENT
RETIRED_COMMUNICATION: 0-->UNDERSTANDS/COMMUNICATES WITHOUT DIFFICULTY
TOILETING: 0-->INDEPENDENT
RETIRED_EATING: 0-->INDEPENDENT
SWALLOWING: 0-->SWALLOWS FOODS/LIQUIDS WITHOUT DIFFICULTY
DRESS: 0-->INDEPENDENT
COGNITION: 0 - NO COGNITION ISSUES REPORTED

## 2018-11-14 NOTE — PROGRESS NOTES
OB Post-partum Note  PPD# 2    S:  Patient doing well.  Pain controlled.  Voiding.  Bleeding is normal.  Breast feeding.    O:  /53  Pulse 81  Temp 98.1  F (36.7  C) (Oral)  Resp 16  SpO2 100%  Breastfeeding  Gen- A&O, NAD  Abd- Non-tender, fundus firm at umbilicus  Perineum intact, healing well  Ext- non-tender, no edema, no leg or calf pain    Hemoglobin   Date Value Ref Range Status   2018 8.8 (L) 11.7 - 15.7 g/dL Final     O pos  Rubella Immune    A/P: 23 year old  PPD# 2 s/p     1.  Routine post-partum cares  2.  Analgesia  3.  Discharge today  4.  The plan of care was discussed with the patient.  She expressed understanding and agreement  5.  RTC in 6 weeks  6.  Indications to call or return were discussed. Post partum instructions were given  7.  Has iron and PNV at homer      Heide Cee  2018  8:26 AM

## 2018-11-14 NOTE — PROGRESS NOTES
[facility] will be sharing updates with you on Maternal Child Health Referral requests for home care services.  This is for care coordination purposes and alert you to referral status.  We received the referral for  Katia Rizvi; MRN [id number] and want to update you:    [facility] is unable to see patient for postpartum/  assessment and education due to patient insurance not contracted with [facility] for this service. [facility].    Patient advised to contact their insurance provider to determine if service is covered through another homecare agency.  Offered option of private pay nurse assessment and education for mom or baby at service rate of 150.00 per visit or 180.00 for both.  Mother declined Private Pay, she did make a F/U appt with the pediatrician on 18. Provided call back information if private pay visit is requested.      Sincerely DAI Schwartz  [phone]

## 2018-11-14 NOTE — LACTATION NOTE
Routine visit with Katia, FOWANDA and baby.  Getting ready for discharge.  Plan: Watch for feeding cues and feed every 2-3 hours and/or on demand. Continue to use feeding log to track intake and appropriate voids and stools. Take feeding log to first follow up appointment or weight check. Encourage skin to skin to promote frequent feedings, thermoregulation and bonding. Follow-up with healthcare provider or lactation consultant for questions or concerns. Following up with [facility].  Has a breast pump.  No further questions at this time. Lizzy Gauthier BSN, RN, PHN, RNC-MNN, IBCLC

## 2018-11-14 NOTE — PLAN OF CARE
Problem: Patient Care Overview  Goal: Plan of Care/Patient Progress Review  Outcome: Improving  VSS, breastfeeding well with minimal assistance, pain well controlled with Tylenol and Ibuprofen, up independently with no complaints of dizziness,  at bedside, interacting and bonding well with infant, plan for discharge today, will continue to monitor.

## 2018-11-14 NOTE — PLAN OF CARE
Problem: Patient Care Overview  Goal: Plan of Care/Patient Progress Review  Outcome: Adequate for Discharge Date Met: 11/14/18  D: VSS, assessments WDL.  I: Pt received complete discharge paperwork and home medications as filled by discharge pharmacy -none. Pt was given times of last dose for all discharge medications in writing on discharge medication sheets. Discharge teaching included home medication, pain management activity restrictions, postpartum cares and symptoms of infection.   A: Discharge outcomes on care plan met. Mother states understanding and comfort with self and baby cares.  P: Pt discharged to home. Pt was discharged with baby, and bands checked at time of discharge. Pt was accompanied by significant other Gómez, nurse and baby, and left with personal belongings. Home care sent. Pt to follow up with OB per MD order. Pt had no further questions at this time and no unmet needs were identified.

## 2019-06-16 ENCOUNTER — APPOINTMENT (OUTPATIENT)
Dept: ULTRASOUND IMAGING | Facility: CLINIC | Age: 24
End: 2019-06-16
Attending: EMERGENCY MEDICINE
Payer: COMMERCIAL

## 2019-06-16 ENCOUNTER — HOSPITAL ENCOUNTER (EMERGENCY)
Facility: CLINIC | Age: 24
Discharge: HOME | End: 2019-06-16
Attending: EMERGENCY MEDICINE | Admitting: EMERGENCY MEDICINE
Payer: COMMERCIAL

## 2019-06-16 VITALS
RESPIRATION RATE: 16 BRPM | DIASTOLIC BLOOD PRESSURE: 63 MMHG | SYSTOLIC BLOOD PRESSURE: 115 MMHG | TEMPERATURE: 98.6 F | HEART RATE: 64 BPM | OXYGEN SATURATION: 98 %

## 2019-06-16 DIAGNOSIS — K80.10 CALCULUS OF GALLBLADDER WITH CHOLECYSTITIS WITHOUT BILIARY OBSTRUCTION, UNSPECIFIED CHOLECYSTITIS ACUITY: ICD-10-CM

## 2019-06-16 DIAGNOSIS — K80.50 BILIARY COLIC: ICD-10-CM

## 2019-06-16 LAB
ALBUMIN SERPL-MCNC: 3.8 G/DL (ref 3.4–5)
ALBUMIN UR-MCNC: NEGATIVE MG/DL
ALP SERPL-CCNC: 73 U/L (ref 40–150)
ALT SERPL W P-5'-P-CCNC: 23 U/L (ref 0–50)
ANION GAP SERPL CALCULATED.3IONS-SCNC: 6 MMOL/L (ref 3–14)
APPEARANCE UR: ABNORMAL
AST SERPL W P-5'-P-CCNC: 9 U/L (ref 0–45)
B-HCG FREE SERPL-ACNC: <5 IU/L
BACTERIA #/AREA URNS HPF: ABNORMAL /HPF
BASOPHILS # BLD AUTO: 0 10E9/L (ref 0–0.2)
BASOPHILS NFR BLD AUTO: 0.3 %
BILIRUB SERPL-MCNC: 0.4 MG/DL (ref 0.2–1.3)
BILIRUB UR QL STRIP: NEGATIVE
BUN SERPL-MCNC: 15 MG/DL (ref 7–30)
CALCIUM SERPL-MCNC: 8.8 MG/DL (ref 8.5–10.1)
CHLORIDE SERPL-SCNC: 109 MMOL/L (ref 94–109)
CO2 SERPL-SCNC: 24 MMOL/L (ref 20–32)
COLOR UR AUTO: YELLOW
CREAT SERPL-MCNC: 0.62 MG/DL (ref 0.52–1.04)
DIFFERENTIAL METHOD BLD: ABNORMAL
EOSINOPHIL # BLD AUTO: 0.4 10E9/L (ref 0–0.7)
EOSINOPHIL NFR BLD AUTO: 3 %
ERYTHROCYTE [DISTWIDTH] IN BLOOD BY AUTOMATED COUNT: 12.9 % (ref 10–15)
GFR SERPL CREATININE-BSD FRML MDRD: >90 ML/MIN/{1.73_M2}
GLUCOSE SERPL-MCNC: 101 MG/DL (ref 70–99)
GLUCOSE UR STRIP-MCNC: NEGATIVE MG/DL
HCT VFR BLD AUTO: 40.6 % (ref 35–47)
HGB BLD-MCNC: 14 G/DL (ref 11.7–15.7)
HGB UR QL STRIP: NEGATIVE
IMM GRANULOCYTES # BLD: 0.1 10E9/L (ref 0–0.4)
IMM GRANULOCYTES NFR BLD: 0.5 %
KETONES UR STRIP-MCNC: NEGATIVE MG/DL
LEUKOCYTE ESTERASE UR QL STRIP: ABNORMAL
LIPASE SERPL-CCNC: 115 U/L (ref 73–393)
LYMPHOCYTES # BLD AUTO: 3.7 10E9/L (ref 0.8–5.3)
LYMPHOCYTES NFR BLD AUTO: 30.7 %
MCH RBC QN AUTO: 30.4 PG (ref 26.5–33)
MCHC RBC AUTO-ENTMCNC: 34.5 G/DL (ref 31.5–36.5)
MCV RBC AUTO: 88 FL (ref 78–100)
MONOCYTES # BLD AUTO: 1 10E9/L (ref 0–1.3)
MONOCYTES NFR BLD AUTO: 8.5 %
NEUTROPHILS # BLD AUTO: 6.8 10E9/L (ref 1.6–8.3)
NEUTROPHILS NFR BLD AUTO: 57 %
NITRATE UR QL: NEGATIVE
NRBC # BLD AUTO: 0 10*3/UL
NRBC BLD AUTO-RTO: 0 /100
PH UR STRIP: 5.5 PH (ref 5–7)
PLATELET # BLD AUTO: 334 10E9/L (ref 150–450)
POTASSIUM SERPL-SCNC: 4.2 MMOL/L (ref 3.4–5.3)
PROT SERPL-MCNC: 7.5 G/DL (ref 6.8–8.8)
RBC # BLD AUTO: 4.6 10E12/L (ref 3.8–5.2)
RBC #/AREA URNS AUTO: 0 /HPF (ref 0–2)
SODIUM SERPL-SCNC: 139 MMOL/L (ref 133–144)
SOURCE: ABNORMAL
SP GR UR STRIP: 1.02 (ref 1–1.03)
SQUAMOUS #/AREA URNS AUTO: 18 /HPF (ref 0–1)
UROBILINOGEN UR STRIP-MCNC: NORMAL MG/DL (ref 0–2)
WBC # BLD AUTO: 12 10E9/L (ref 4–11)
WBC #/AREA URNS AUTO: 3 /HPF (ref 0–5)

## 2019-06-16 PROCEDURE — 76705 ECHO EXAM OF ABDOMEN: CPT

## 2019-06-16 PROCEDURE — 80053 COMPREHEN METABOLIC PANEL: CPT | Performed by: EMERGENCY MEDICINE

## 2019-06-16 PROCEDURE — 25000132 ZZH RX MED GY IP 250 OP 250 PS 637: Performed by: EMERGENCY MEDICINE

## 2019-06-16 PROCEDURE — 99285 EMERGENCY DEPT VISIT HI MDM: CPT | Mod: 25

## 2019-06-16 PROCEDURE — 96361 HYDRATE IV INFUSION ADD-ON: CPT

## 2019-06-16 PROCEDURE — 96374 THER/PROPH/DIAG INJ IV PUSH: CPT

## 2019-06-16 PROCEDURE — 84702 CHORIONIC GONADOTROPIN TEST: CPT

## 2019-06-16 PROCEDURE — 81001 URINALYSIS AUTO W/SCOPE: CPT | Performed by: EMERGENCY MEDICINE

## 2019-06-16 PROCEDURE — 96375 TX/PRO/DX INJ NEW DRUG ADDON: CPT

## 2019-06-16 PROCEDURE — 83690 ASSAY OF LIPASE: CPT | Performed by: EMERGENCY MEDICINE

## 2019-06-16 PROCEDURE — 85025 COMPLETE CBC W/AUTO DIFF WBC: CPT | Performed by: EMERGENCY MEDICINE

## 2019-06-16 PROCEDURE — 25000128 H RX IP 250 OP 636: Performed by: EMERGENCY MEDICINE

## 2019-06-16 RX ORDER — HYDROMORPHONE HYDROCHLORIDE 1 MG/ML
0.5 INJECTION, SOLUTION INTRAMUSCULAR; INTRAVENOUS; SUBCUTANEOUS
Status: DISCONTINUED | OUTPATIENT
Start: 2019-06-16 | End: 2019-06-16 | Stop reason: HOSPADM

## 2019-06-16 RX ORDER — KETOROLAC TROMETHAMINE 15 MG/ML
15 INJECTION, SOLUTION INTRAMUSCULAR; INTRAVENOUS ONCE
Status: COMPLETED | OUTPATIENT
Start: 2019-06-16 | End: 2019-06-16

## 2019-06-16 RX ORDER — ONDANSETRON 4 MG/1
4 TABLET, ORALLY DISINTEGRATING ORAL EVERY 8 HOURS PRN
Qty: 10 TABLET | Refills: 0 | Status: ON HOLD | OUTPATIENT
Start: 2019-06-16 | End: 2019-06-20

## 2019-06-16 RX ORDER — OXYCODONE HYDROCHLORIDE 5 MG/1
5 TABLET ORAL EVERY 6 HOURS PRN
Qty: 12 TABLET | Refills: 0 | Status: ON HOLD | OUTPATIENT
Start: 2019-06-16 | End: 2019-06-20

## 2019-06-16 RX ORDER — ONDANSETRON 2 MG/ML
4 INJECTION INTRAMUSCULAR; INTRAVENOUS EVERY 30 MIN PRN
Status: DISCONTINUED | OUTPATIENT
Start: 2019-06-16 | End: 2019-06-16 | Stop reason: HOSPADM

## 2019-06-16 RX ADMIN — SODIUM CHLORIDE 1000 ML: 9 INJECTION, SOLUTION INTRAVENOUS at 11:36

## 2019-06-16 RX ADMIN — ONDANSETRON 4 MG: 2 INJECTION INTRAMUSCULAR; INTRAVENOUS at 11:37

## 2019-06-16 RX ADMIN — HYDROMORPHONE HYDROCHLORIDE 0.5 MG: 1 INJECTION, SOLUTION INTRAMUSCULAR; INTRAVENOUS; SUBCUTANEOUS at 12:33

## 2019-06-16 RX ADMIN — KETOROLAC TROMETHAMINE 15 MG: 15 INJECTION, SOLUTION INTRAMUSCULAR; INTRAVENOUS at 11:48

## 2019-06-16 RX ADMIN — ACETAMINOPHINE, CAFFEINE 2 TABLET: 500; 65 TABLET, FILM COATED ORAL at 13:07

## 2019-06-16 RX ADMIN — HYDROMORPHONE HYDROCHLORIDE 0.5 MG: 1 INJECTION, SOLUTION INTRAMUSCULAR; INTRAVENOUS; SUBCUTANEOUS at 11:39

## 2019-06-16 ASSESSMENT — ENCOUNTER SYMPTOMS
CONSTIPATION: 0
DIARRHEA: 0
ABDOMINAL PAIN: 1
FEVER: 0
SHORTNESS OF BREATH: 0

## 2019-06-16 NOTE — DISCHARGE INSTRUCTIONS

## 2019-06-16 NOTE — ED AVS SNAPSHOT
Emergency Department  [address] [facility] [address]  Phone:  [phone]  Fax:  [phone]                                    Katia Rizvi   MRN: [id number]    Department:   Emergency Department   Date of Visit:  6/16/2019           After Visit Summary Signature Page    I have received my discharge instructions, and my questions have been answered. I have discussed any challenges I see with this plan with the nurse or doctor.    ..........................................................................................................................................  Patient/Patient Representative Signature      ..........................................................................................................................................  Patient Representative Print Name and Relationship to Patient    ..................................................               ................................................  Date                                   Time    ..........................................................................................................................................  Reviewed by Signature/Title    ...................................................              ..............................................  Date                                               Time          22EPIC Rev 08/18
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
## 2019-06-18 ENCOUNTER — HOSPITAL ENCOUNTER (INPATIENT)
Facility: CLINIC | Age: 24
LOS: 1 days | Discharge: HOME | DRG: 418 | End: 2019-06-20
Attending: EMERGENCY MEDICINE | Admitting: HOSPITALIST
Payer: COMMERCIAL

## 2019-06-18 ENCOUNTER — APPOINTMENT (OUTPATIENT)
Dept: ULTRASOUND IMAGING | Facility: CLINIC | Age: 24
DRG: 418 | End: 2019-06-18
Attending: EMERGENCY MEDICINE
Payer: COMMERCIAL

## 2019-06-18 DIAGNOSIS — G89.18 ACUTE POST-OPERATIVE PAIN: Primary | ICD-10-CM

## 2019-06-18 DIAGNOSIS — K81.0 ACUTE CHOLECYSTITIS: ICD-10-CM

## 2019-06-18 DIAGNOSIS — K83.1 BILIARY OBSTRUCTION (H): ICD-10-CM

## 2019-06-18 LAB
ALBUMIN SERPL-MCNC: 4 G/DL (ref 3.4–5)
ALP SERPL-CCNC: 92 U/L (ref 40–150)
ALT SERPL W P-5'-P-CCNC: 147 U/L (ref 0–50)
ANION GAP SERPL CALCULATED.3IONS-SCNC: 7 MMOL/L (ref 3–14)
AST SERPL W P-5'-P-CCNC: 115 U/L (ref 0–45)
BASOPHILS # BLD AUTO: 0 10E9/L (ref 0–0.2)
BASOPHILS NFR BLD AUTO: 0.3 %
BILIRUB SERPL-MCNC: 0.4 MG/DL (ref 0.2–1.3)
BUN SERPL-MCNC: 13 MG/DL (ref 7–30)
CALCIUM SERPL-MCNC: 9.3 MG/DL (ref 8.5–10.1)
CHLORIDE SERPL-SCNC: 108 MMOL/L (ref 94–109)
CO2 SERPL-SCNC: 28 MMOL/L (ref 20–32)
CREAT SERPL-MCNC: 0.74 MG/DL (ref 0.52–1.04)
DIFFERENTIAL METHOD BLD: ABNORMAL
EOSINOPHIL # BLD AUTO: 0.4 10E9/L (ref 0–0.7)
EOSINOPHIL NFR BLD AUTO: 3.6 %
ERYTHROCYTE [DISTWIDTH] IN BLOOD BY AUTOMATED COUNT: 12.8 % (ref 10–15)
GFR SERPL CREATININE-BSD FRML MDRD: >90 ML/MIN/{1.73_M2}
GLUCOSE SERPL-MCNC: 94 MG/DL (ref 70–99)
HCT VFR BLD AUTO: 40.6 % (ref 35–47)
HGB BLD-MCNC: 13.9 G/DL (ref 11.7–15.7)
IMM GRANULOCYTES # BLD: 0 10E9/L (ref 0–0.4)
IMM GRANULOCYTES NFR BLD: 0.3 %
LIPASE SERPL-CCNC: 148 U/L (ref 73–393)
LYMPHOCYTES # BLD AUTO: 4.8 10E9/L (ref 0.8–5.3)
LYMPHOCYTES NFR BLD AUTO: 40.2 %
MCH RBC QN AUTO: 30.5 PG (ref 26.5–33)
MCHC RBC AUTO-ENTMCNC: 34.2 G/DL (ref 31.5–36.5)
MCV RBC AUTO: 89 FL (ref 78–100)
MONOCYTES # BLD AUTO: 0.8 10E9/L (ref 0–1.3)
MONOCYTES NFR BLD AUTO: 7.1 %
NEUTROPHILS # BLD AUTO: 5.8 10E9/L (ref 1.6–8.3)
NEUTROPHILS NFR BLD AUTO: 48.5 %
NRBC # BLD AUTO: 0 10*3/UL
NRBC BLD AUTO-RTO: 0 /100
PLATELET # BLD AUTO: 325 10E9/L (ref 150–450)
POTASSIUM SERPL-SCNC: 3.9 MMOL/L (ref 3.4–5.3)
PROT SERPL-MCNC: 7.7 G/DL (ref 6.8–8.8)
RBC # BLD AUTO: 4.56 10E12/L (ref 3.8–5.2)
SODIUM SERPL-SCNC: 143 MMOL/L (ref 133–144)
WBC # BLD AUTO: 11.8 10E9/L (ref 4–11)

## 2019-06-18 PROCEDURE — 96361 HYDRATE IV INFUSION ADD-ON: CPT

## 2019-06-18 PROCEDURE — 25000128 H RX IP 250 OP 636: Performed by: EMERGENCY MEDICINE

## 2019-06-18 PROCEDURE — 80053 COMPREHEN METABOLIC PANEL: CPT | Performed by: EMERGENCY MEDICINE

## 2019-06-18 PROCEDURE — 99205 OFFICE O/P NEW HI 60 MIN: CPT | Performed by: HOSPITALIST

## 2019-06-18 PROCEDURE — 85025 COMPLETE CBC W/AUTO DIFF WBC: CPT | Performed by: EMERGENCY MEDICINE

## 2019-06-18 PROCEDURE — 96375 TX/PRO/DX INJ NEW DRUG ADDON: CPT

## 2019-06-18 PROCEDURE — 96376 TX/PRO/DX INJ SAME DRUG ADON: CPT

## 2019-06-18 PROCEDURE — 83690 ASSAY OF LIPASE: CPT | Performed by: EMERGENCY MEDICINE

## 2019-06-18 PROCEDURE — 76705 ECHO EXAM OF ABDOMEN: CPT

## 2019-06-18 PROCEDURE — 96365 THER/PROPH/DIAG IV INF INIT: CPT

## 2019-06-18 PROCEDURE — 25800030 ZZH RX IP 258 OP 636: Performed by: EMERGENCY MEDICINE

## 2019-06-18 PROCEDURE — 99285 EMERGENCY DEPT VISIT HI MDM: CPT | Mod: 25

## 2019-06-18 RX ORDER — PIPERACILLIN SODIUM, TAZOBACTAM SODIUM 3; .375 G/15ML; G/15ML
3.38 INJECTION, POWDER, LYOPHILIZED, FOR SOLUTION INTRAVENOUS ONCE
Status: COMPLETED | OUTPATIENT
Start: 2019-06-18 | End: 2019-06-18

## 2019-06-18 RX ORDER — ONDANSETRON 2 MG/ML
4 INJECTION INTRAMUSCULAR; INTRAVENOUS ONCE
Status: DISCONTINUED | OUTPATIENT
Start: 2019-06-18 | End: 2019-06-19

## 2019-06-18 RX ORDER — ONDANSETRON 2 MG/ML
4 INJECTION INTRAMUSCULAR; INTRAVENOUS EVERY 30 MIN PRN
Status: DISCONTINUED | OUTPATIENT
Start: 2019-06-18 | End: 2019-06-19

## 2019-06-18 RX ORDER — SODIUM CHLORIDE 9 MG/ML
1000 INJECTION, SOLUTION INTRAVENOUS CONTINUOUS
Status: DISCONTINUED | OUTPATIENT
Start: 2019-06-18 | End: 2019-06-19

## 2019-06-18 RX ADMIN — SODIUM CHLORIDE 1000 ML: 9 INJECTION, SOLUTION INTRAVENOUS at 22:12

## 2019-06-18 RX ADMIN — PIPERACILLIN SODIUM,TAZOBACTAM SODIUM 3.38 G: 3; .375 INJECTION, POWDER, FOR SOLUTION INTRAVENOUS at 22:40

## 2019-06-18 RX ADMIN — ONDANSETRON 4 MG: 2 INJECTION INTRAMUSCULAR; INTRAVENOUS at 22:12

## 2019-06-18 RX ADMIN — HYDROMORPHONE HYDROCHLORIDE 1 MG: 1 INJECTION, SOLUTION INTRAMUSCULAR; INTRAVENOUS; SUBCUTANEOUS at 22:12

## 2019-06-18 RX ADMIN — SODIUM CHLORIDE 1000 ML: 9 INJECTION, SOLUTION INTRAVENOUS at 22:43

## 2019-06-18 ASSESSMENT — ENCOUNTER SYMPTOMS
NAUSEA: 0
FEVER: 0
ABDOMINAL PAIN: 1
VOMITING: 0

## 2019-06-18 ASSESSMENT — MIFFLIN-ST. JEOR: SCORE: 2104.4

## 2019-06-19 ENCOUNTER — APPOINTMENT (OUTPATIENT)
Dept: GENERAL RADIOLOGY | Facility: CLINIC | Age: 24
DRG: 418 | End: 2019-06-19
Attending: HOSPITALIST
Payer: COMMERCIAL

## 2019-06-19 ENCOUNTER — ANESTHESIA (OUTPATIENT)
Dept: SURGERY | Facility: CLINIC | Age: 24
DRG: 418 | End: 2019-06-19
Payer: COMMERCIAL

## 2019-06-19 ENCOUNTER — ANESTHESIA EVENT (OUTPATIENT)
Dept: SURGERY | Facility: CLINIC | Age: 24
DRG: 418 | End: 2019-06-19
Payer: COMMERCIAL

## 2019-06-19 PROBLEM — K81.0 ACUTE CHOLECYSTITIS: Status: ACTIVE | Noted: 2019-06-19

## 2019-06-19 PROBLEM — K80.50 CHOLEDOCHOLITHIASIS: Status: ACTIVE | Noted: 2019-06-19

## 2019-06-19 LAB
ALBUMIN SERPL-MCNC: 3.3 G/DL (ref 3.4–5)
ALP SERPL-CCNC: 81 U/L (ref 40–150)
ALT SERPL W P-5'-P-CCNC: 207 U/L (ref 0–50)
ANION GAP SERPL CALCULATED.3IONS-SCNC: 1 MMOL/L (ref 3–14)
AST SERPL W P-5'-P-CCNC: 184 U/L (ref 0–45)
BILIRUB DIRECT SERPL-MCNC: 0.1 MG/DL (ref 0–0.2)
BILIRUB SERPL-MCNC: 0.5 MG/DL (ref 0.2–1.3)
BUN SERPL-MCNC: 13 MG/DL (ref 7–30)
CALCIUM SERPL-MCNC: 8.3 MG/DL (ref 8.5–10.1)
CHLORIDE SERPL-SCNC: 110 MMOL/L (ref 94–109)
CO2 SERPL-SCNC: 30 MMOL/L (ref 20–32)
CREAT SERPL-MCNC: 0.79 MG/DL (ref 0.52–1.04)
GFR SERPL CREATININE-BSD FRML MDRD: >90 ML/MIN/{1.73_M2}
GLUCOSE SERPL-MCNC: 97 MG/DL (ref 70–99)
HCG UR QL: NEGATIVE
POTASSIUM SERPL-SCNC: 4.2 MMOL/L (ref 3.4–5.3)
PROT SERPL-MCNC: 6.1 G/DL (ref 6.8–8.8)
SODIUM SERPL-SCNC: 141 MMOL/L (ref 133–144)
WBC # BLD AUTO: 9.3 10E9/L (ref 4–11)

## 2019-06-19 PROCEDURE — 8E0WXBF COMPUTER ASSISTED PROCEDURE OF TRUNK REGION, WITH FLUOROSCOPY: ICD-10-PCS | Performed by: SURGERY

## 2019-06-19 PROCEDURE — 25000125 ZZHC RX 250: Performed by: NURSE ANESTHETIST, CERTIFIED REGISTERED

## 2019-06-19 PROCEDURE — 40000277 XR SURGERY CARM FLUORO LESS THAN 5 MIN W STILLS

## 2019-06-19 PROCEDURE — 25000128 H RX IP 250 OP 636: Performed by: NURSE ANESTHETIST, CERTIFIED REGISTERED

## 2019-06-19 PROCEDURE — G0378 HOSPITAL OBSERVATION PER HR: HCPCS

## 2019-06-19 PROCEDURE — 96376 TX/PRO/DX INJ SAME DRUG ADON: CPT

## 2019-06-19 PROCEDURE — 81025 URINE PREGNANCY TEST: CPT | Performed by: ANESTHESIOLOGY

## 2019-06-19 PROCEDURE — 25000132 ZZH RX MED GY IP 250 OP 250 PS 637: Performed by: PHYSICIAN ASSISTANT

## 2019-06-19 PROCEDURE — 80048 BASIC METABOLIC PNL TOTAL CA: CPT | Performed by: HOSPITALIST

## 2019-06-19 PROCEDURE — 25000128 H RX IP 250 OP 636: Performed by: HOSPITALIST

## 2019-06-19 PROCEDURE — 25000128 H RX IP 250 OP 636: Performed by: EMERGENCY MEDICINE

## 2019-06-19 PROCEDURE — 96361 HYDRATE IV INFUSION ADD-ON: CPT

## 2019-06-19 PROCEDURE — 99204 OFFICE O/P NEW MOD 45 MIN: CPT | Mod: 57 | Performed by: SURGERY

## 2019-06-19 PROCEDURE — 37000009 ZZH ANESTHESIA TECHNICAL FEE, EACH ADDTL 15 MIN: Performed by: SURGERY

## 2019-06-19 PROCEDURE — 25000125 ZZHC RX 250: Performed by: HOSPITALIST

## 2019-06-19 PROCEDURE — 96366 THER/PROPH/DIAG IV INF ADDON: CPT

## 2019-06-19 PROCEDURE — 25000128 H RX IP 250 OP 636: Performed by: PHYSICIAN ASSISTANT

## 2019-06-19 PROCEDURE — 71000012 ZZH RECOVERY PHASE 1 LEVEL 1 FIRST HR: Performed by: SURGERY

## 2019-06-19 PROCEDURE — 25800030 ZZH RX IP 258 OP 636: Performed by: HOSPITALIST

## 2019-06-19 PROCEDURE — 47563 LAPARO CHOLECYSTECTOMY/GRAPH: CPT | Mod: AS | Performed by: PHYSICIAN ASSISTANT

## 2019-06-19 PROCEDURE — 36000093 ZZH SURGERY LEVEL 4 1ST 30 MIN: Performed by: SURGERY

## 2019-06-19 PROCEDURE — 25000125 ZZHC RX 250: Performed by: PHYSICIAN ASSISTANT

## 2019-06-19 PROCEDURE — 25800030 ZZH RX IP 258 OP 636: Performed by: NURSE ANESTHETIST, CERTIFIED REGISTERED

## 2019-06-19 PROCEDURE — 27210794 ZZH OR GENERAL SUPPLY STERILE: Performed by: SURGERY

## 2019-06-19 PROCEDURE — 37000008 ZZH ANESTHESIA TECHNICAL FEE, 1ST 30 MIN: Performed by: SURGERY

## 2019-06-19 PROCEDURE — BF131ZZ FLUOROSCOPY OF GALLBLADDER AND BILE DUCTS USING LOW OSMOLAR CONTRAST: ICD-10-PCS | Performed by: SURGERY

## 2019-06-19 PROCEDURE — 88304 TISSUE EXAM BY PATHOLOGIST: CPT | Mod: 26 | Performed by: SURGERY

## 2019-06-19 PROCEDURE — 85048 AUTOMATED LEUKOCYTE COUNT: CPT | Performed by: HOSPITALIST

## 2019-06-19 PROCEDURE — 88304 TISSUE EXAM BY PATHOLOGIST: CPT | Performed by: SURGERY

## 2019-06-19 PROCEDURE — 40000170 ZZH STATISTIC PRE-PROCEDURE ASSESSMENT II: Performed by: SURGERY

## 2019-06-19 PROCEDURE — 25000128 H RX IP 250 OP 636: Performed by: SURGERY

## 2019-06-19 PROCEDURE — 0FT44ZZ RESECTION OF GALLBLADDER, PERCUTANEOUS ENDOSCOPIC APPROACH: ICD-10-PCS | Performed by: SURGERY

## 2019-06-19 PROCEDURE — 12000000 ZZH R&B MED SURG/OB

## 2019-06-19 PROCEDURE — 25800030 ZZH RX IP 258 OP 636: Performed by: PHYSICIAN ASSISTANT

## 2019-06-19 PROCEDURE — 99232 SBSQ HOSP IP/OBS MODERATE 35: CPT | Performed by: INTERNAL MEDICINE

## 2019-06-19 PROCEDURE — 47563 LAPARO CHOLECYSTECTOMY/GRAPH: CPT | Performed by: SURGERY

## 2019-06-19 PROCEDURE — 36415 COLL VENOUS BLD VENIPUNCTURE: CPT | Performed by: HOSPITALIST

## 2019-06-19 PROCEDURE — 25000566 ZZH SEVOFLURANE, EA 15 MIN: Performed by: SURGERY

## 2019-06-19 PROCEDURE — 25800030 ZZH RX IP 258 OP 636: Performed by: SURGERY

## 2019-06-19 PROCEDURE — 36000063 ZZH SURGERY LEVEL 4 EA 15 ADDTL MIN: Performed by: SURGERY

## 2019-06-19 PROCEDURE — 25000128 H RX IP 250 OP 636: Performed by: INTERNAL MEDICINE

## 2019-06-19 PROCEDURE — 25000125 ZZHC RX 250: Performed by: SURGERY

## 2019-06-19 PROCEDURE — 80076 HEPATIC FUNCTION PANEL: CPT | Performed by: HOSPITALIST

## 2019-06-19 RX ORDER — HYDROMORPHONE HYDROCHLORIDE 1 MG/ML
.3-.5 INJECTION, SOLUTION INTRAMUSCULAR; INTRAVENOUS; SUBCUTANEOUS EVERY 5 MIN PRN
Status: DISCONTINUED | OUTPATIENT
Start: 2019-06-19 | End: 2019-06-19 | Stop reason: HOSPADM

## 2019-06-19 RX ORDER — ONDANSETRON 4 MG/1
4 TABLET, ORALLY DISINTEGRATING ORAL EVERY 30 MIN PRN
Status: DISCONTINUED | OUTPATIENT
Start: 2019-06-19 | End: 2019-06-19 | Stop reason: HOSPADM

## 2019-06-19 RX ORDER — LABETALOL HYDROCHLORIDE 5 MG/ML
10 INJECTION, SOLUTION INTRAVENOUS
Status: DISCONTINUED | OUTPATIENT
Start: 2019-06-19 | End: 2019-06-19 | Stop reason: HOSPADM

## 2019-06-19 RX ORDER — SODIUM CHLORIDE, SODIUM LACTATE, POTASSIUM CHLORIDE, CALCIUM CHLORIDE 600; 310; 30; 20 MG/100ML; MG/100ML; MG/100ML; MG/100ML
INJECTION, SOLUTION INTRAVENOUS CONTINUOUS
Status: CANCELLED | OUTPATIENT
Start: 2019-06-19

## 2019-06-19 RX ORDER — ONDANSETRON 2 MG/ML
4 INJECTION INTRAMUSCULAR; INTRAVENOUS EVERY 6 HOURS PRN
Status: DISCONTINUED | OUTPATIENT
Start: 2019-06-19 | End: 2019-06-19 | Stop reason: HOSPADM

## 2019-06-19 RX ORDER — FENTANYL CITRATE 50 UG/ML
INJECTION, SOLUTION INTRAMUSCULAR; INTRAVENOUS PRN
Status: DISCONTINUED | OUTPATIENT
Start: 2019-06-19 | End: 2019-06-19

## 2019-06-19 RX ORDER — NALOXONE HYDROCHLORIDE 0.4 MG/ML
.1-.4 INJECTION, SOLUTION INTRAMUSCULAR; INTRAVENOUS; SUBCUTANEOUS
Status: DISCONTINUED | OUTPATIENT
Start: 2019-06-19 | End: 2019-06-19

## 2019-06-19 RX ORDER — LIDOCAINE HYDROCHLORIDE 20 MG/ML
INJECTION, SOLUTION INFILTRATION; PERINEURAL PRN
Status: DISCONTINUED | OUTPATIENT
Start: 2019-06-19 | End: 2019-06-19

## 2019-06-19 RX ORDER — ONDANSETRON 2 MG/ML
INJECTION INTRAMUSCULAR; INTRAVENOUS PRN
Status: DISCONTINUED | OUTPATIENT
Start: 2019-06-19 | End: 2019-06-19

## 2019-06-19 RX ORDER — NALOXONE HYDROCHLORIDE 0.4 MG/ML
.1-.4 INJECTION, SOLUTION INTRAMUSCULAR; INTRAVENOUS; SUBCUTANEOUS
Status: DISCONTINUED | OUTPATIENT
Start: 2019-06-19 | End: 2019-06-20 | Stop reason: HOSPADM

## 2019-06-19 RX ORDER — HYDROMORPHONE HYDROCHLORIDE 1 MG/ML
0.2 INJECTION, SOLUTION INTRAMUSCULAR; INTRAVENOUS; SUBCUTANEOUS
Status: DISCONTINUED | OUTPATIENT
Start: 2019-06-19 | End: 2019-06-19

## 2019-06-19 RX ORDER — ONDANSETRON 2 MG/ML
4 INJECTION INTRAMUSCULAR; INTRAVENOUS EVERY 6 HOURS PRN
Status: DISCONTINUED | OUTPATIENT
Start: 2019-06-19 | End: 2019-06-20 | Stop reason: HOSPADM

## 2019-06-19 RX ORDER — BISACODYL 10 MG
10 SUPPOSITORY, RECTAL RECTAL DAILY PRN
Status: DISCONTINUED | OUTPATIENT
Start: 2019-06-19 | End: 2019-06-20 | Stop reason: HOSPADM

## 2019-06-19 RX ORDER — PIPERACILLIN SODIUM, TAZOBACTAM SODIUM 3; .375 G/15ML; G/15ML
3.38 INJECTION, POWDER, LYOPHILIZED, FOR SOLUTION INTRAVENOUS EVERY 6 HOURS
Status: DISCONTINUED | OUTPATIENT
Start: 2019-06-19 | End: 2019-06-20 | Stop reason: HOSPADM

## 2019-06-19 RX ORDER — HYDROMORPHONE HYDROCHLORIDE 1 MG/ML
.3-.5 INJECTION, SOLUTION INTRAMUSCULAR; INTRAVENOUS; SUBCUTANEOUS
Status: DISCONTINUED | OUTPATIENT
Start: 2019-06-19 | End: 2019-06-20 | Stop reason: HOSPADM

## 2019-06-19 RX ORDER — DEXAMETHASONE SODIUM PHOSPHATE 4 MG/ML
INJECTION, SOLUTION INTRA-ARTICULAR; INTRALESIONAL; INTRAMUSCULAR; INTRAVENOUS; SOFT TISSUE PRN
Status: DISCONTINUED | OUTPATIENT
Start: 2019-06-19 | End: 2019-06-19

## 2019-06-19 RX ORDER — FENTANYL CITRATE 50 UG/ML
25-50 INJECTION, SOLUTION INTRAMUSCULAR; INTRAVENOUS
Status: DISCONTINUED | OUTPATIENT
Start: 2019-06-19 | End: 2019-06-19 | Stop reason: HOSPADM

## 2019-06-19 RX ORDER — SODIUM CHLORIDE 9 MG/ML
INJECTION, SOLUTION INTRAVENOUS CONTINUOUS
Status: DISCONTINUED | OUTPATIENT
Start: 2019-06-19 | End: 2019-06-20 | Stop reason: HOSPADM

## 2019-06-19 RX ORDER — KETOROLAC TROMETHAMINE 30 MG/ML
30 INJECTION, SOLUTION INTRAMUSCULAR; INTRAVENOUS
Status: COMPLETED | OUTPATIENT
Start: 2019-06-19 | End: 2019-06-19

## 2019-06-19 RX ORDER — SODIUM CHLORIDE, SODIUM LACTATE, POTASSIUM CHLORIDE, CALCIUM CHLORIDE 600; 310; 30; 20 MG/100ML; MG/100ML; MG/100ML; MG/100ML
INJECTION, SOLUTION INTRAVENOUS CONTINUOUS
Status: DISCONTINUED | OUTPATIENT
Start: 2019-06-19 | End: 2019-06-19 | Stop reason: HOSPADM

## 2019-06-19 RX ORDER — OXYCODONE HYDROCHLORIDE 5 MG/1
5-10 TABLET ORAL
Status: DISCONTINUED | OUTPATIENT
Start: 2019-06-19 | End: 2019-06-20

## 2019-06-19 RX ORDER — PROCHLORPERAZINE 25 MG
25 SUPPOSITORY, RECTAL RECTAL EVERY 12 HOURS PRN
Status: DISCONTINUED | OUTPATIENT
Start: 2019-06-19 | End: 2019-06-20 | Stop reason: HOSPADM

## 2019-06-19 RX ORDER — MEPERIDINE HYDROCHLORIDE 25 MG/ML
12.5 INJECTION INTRAMUSCULAR; INTRAVENOUS; SUBCUTANEOUS EVERY 5 MIN PRN
Status: DISCONTINUED | OUTPATIENT
Start: 2019-06-19 | End: 2019-06-19 | Stop reason: HOSPADM

## 2019-06-19 RX ORDER — PROCHLORPERAZINE MALEATE 5 MG
5 TABLET ORAL EVERY 6 HOURS PRN
Status: DISCONTINUED | OUTPATIENT
Start: 2019-06-19 | End: 2019-06-20 | Stop reason: HOSPADM

## 2019-06-19 RX ORDER — PROPOFOL 10 MG/ML
INJECTION, EMULSION INTRAVENOUS CONTINUOUS PRN
Status: DISCONTINUED | OUTPATIENT
Start: 2019-06-19 | End: 2019-06-19

## 2019-06-19 RX ORDER — ONDANSETRON 4 MG/1
4 TABLET, ORALLY DISINTEGRATING ORAL EVERY 6 HOURS PRN
Status: DISCONTINUED | OUTPATIENT
Start: 2019-06-19 | End: 2019-06-20 | Stop reason: HOSPADM

## 2019-06-19 RX ORDER — PROPOFOL 10 MG/ML
INJECTION, EMULSION INTRAVENOUS PRN
Status: DISCONTINUED | OUTPATIENT
Start: 2019-06-19 | End: 2019-06-19

## 2019-06-19 RX ORDER — NEOSTIGMINE METHYLSULFATE 1 MG/ML
VIAL (ML) INJECTION PRN
Status: DISCONTINUED | OUTPATIENT
Start: 2019-06-19 | End: 2019-06-19

## 2019-06-19 RX ORDER — GLYCOPYRROLATE 0.2 MG/ML
INJECTION, SOLUTION INTRAMUSCULAR; INTRAVENOUS PRN
Status: DISCONTINUED | OUTPATIENT
Start: 2019-06-19 | End: 2019-06-19

## 2019-06-19 RX ORDER — ACETAMINOPHEN 650 MG/1
650 SUPPOSITORY RECTAL EVERY 4 HOURS PRN
Status: DISCONTINUED | OUTPATIENT
Start: 2019-06-19 | End: 2019-06-20 | Stop reason: HOSPADM

## 2019-06-19 RX ORDER — ONDANSETRON 2 MG/ML
4 INJECTION INTRAMUSCULAR; INTRAVENOUS EVERY 30 MIN PRN
Status: DISCONTINUED | OUTPATIENT
Start: 2019-06-19 | End: 2019-06-19 | Stop reason: HOSPADM

## 2019-06-19 RX ORDER — ACETAMINOPHEN 325 MG/1
650 TABLET ORAL EVERY 4 HOURS PRN
Status: DISCONTINUED | OUTPATIENT
Start: 2019-06-19 | End: 2019-06-20 | Stop reason: HOSPADM

## 2019-06-19 RX ADMIN — SUGAMMADEX 200 MG: 100 INJECTION, SOLUTION INTRAVENOUS at 12:18

## 2019-06-19 RX ADMIN — FENTANYL CITRATE 50 MCG: 50 INJECTION, SOLUTION INTRAMUSCULAR; INTRAVENOUS at 11:04

## 2019-06-19 RX ADMIN — PROPOFOL 200 MG: 10 INJECTION, EMULSION INTRAVENOUS at 10:58

## 2019-06-19 RX ADMIN — FENTANYL CITRATE 100 MCG: 50 INJECTION, SOLUTION INTRAMUSCULAR; INTRAVENOUS at 10:58

## 2019-06-19 RX ADMIN — PHENYLEPHRINE HYDROCHLORIDE 100 MCG: 10 INJECTION INTRAVENOUS at 11:20

## 2019-06-19 RX ADMIN — ONDANSETRON 4 MG: 2 INJECTION INTRAMUSCULAR; INTRAVENOUS at 09:19

## 2019-06-19 RX ADMIN — FENTANYL CITRATE 50 MCG: 50 INJECTION, SOLUTION INTRAMUSCULAR; INTRAVENOUS at 12:34

## 2019-06-19 RX ADMIN — OXYCODONE HYDROCHLORIDE 10 MG: 5 TABLET ORAL at 22:19

## 2019-06-19 RX ADMIN — PIPERACILLIN SODIUM,TAZOBACTAM SODIUM 3.38 G: 3; .375 INJECTION, POWDER, FOR SOLUTION INTRAVENOUS at 04:29

## 2019-06-19 RX ADMIN — KETOROLAC TROMETHAMINE 30 MG: 30 INJECTION, SOLUTION INTRAMUSCULAR at 12:53

## 2019-06-19 RX ADMIN — ROCURONIUM BROMIDE 20 MG: 10 INJECTION INTRAVENOUS at 11:53

## 2019-06-19 RX ADMIN — FAMOTIDINE 20 MG: 10 INJECTION, SOLUTION INTRAVENOUS at 02:00

## 2019-06-19 RX ADMIN — DEXAMETHASONE SODIUM PHOSPHATE 4 MG: 4 INJECTION, SOLUTION INTRA-ARTICULAR; INTRALESIONAL; INTRAMUSCULAR; INTRAVENOUS; SOFT TISSUE at 11:20

## 2019-06-19 RX ADMIN — PROPOFOL 100 MCG/KG/MIN: 10 INJECTION, EMULSION INTRAVENOUS at 10:58

## 2019-06-19 RX ADMIN — HYDROMORPHONE HYDROCHLORIDE 0.5 MG: 1 INJECTION, SOLUTION INTRAMUSCULAR; INTRAVENOUS; SUBCUTANEOUS at 13:30

## 2019-06-19 RX ADMIN — FENTANYL CITRATE 50 MCG: 50 INJECTION, SOLUTION INTRAMUSCULAR; INTRAVENOUS at 12:41

## 2019-06-19 RX ADMIN — HYDROMORPHONE HYDROCHLORIDE 0.5 MG: 1 INJECTION, SOLUTION INTRAMUSCULAR; INTRAVENOUS; SUBCUTANEOUS at 13:00

## 2019-06-19 RX ADMIN — FAMOTIDINE 20 MG: 10 INJECTION, SOLUTION INTRAVENOUS at 15:18

## 2019-06-19 RX ADMIN — PIPERACILLIN SODIUM,TAZOBACTAM SODIUM 3.38 G: 3; .375 INJECTION, POWDER, FOR SOLUTION INTRAVENOUS at 11:17

## 2019-06-19 RX ADMIN — PROCHLORPERAZINE EDISYLATE 5 MG: 5 INJECTION INTRAMUSCULAR; INTRAVENOUS at 17:09

## 2019-06-19 RX ADMIN — OXYCODONE HYDROCHLORIDE 10 MG: 5 TABLET ORAL at 16:19

## 2019-06-19 RX ADMIN — OXYCODONE HYDROCHLORIDE 10 MG: 5 TABLET ORAL at 19:22

## 2019-06-19 RX ADMIN — SODIUM CHLORIDE, POTASSIUM CHLORIDE, SODIUM LACTATE AND CALCIUM CHLORIDE: 600; 310; 30; 20 INJECTION, SOLUTION INTRAVENOUS at 10:53

## 2019-06-19 RX ADMIN — HYDROMORPHONE HYDROCHLORIDE 1 MG: 1 INJECTION, SOLUTION INTRAMUSCULAR; INTRAVENOUS; SUBCUTANEOUS at 00:28

## 2019-06-19 RX ADMIN — GLUCAGON HYDROCHLORIDE 1 MG: KIT at 11:46

## 2019-06-19 RX ADMIN — MEPERIDINE HYDROCHLORIDE 12.5 MG: 25 INJECTION INTRAMUSCULAR; INTRAVENOUS; SUBCUTANEOUS at 12:44

## 2019-06-19 RX ADMIN — HYDROMORPHONE HYDROCHLORIDE 0.5 MG: 1 INJECTION, SOLUTION INTRAMUSCULAR; INTRAVENOUS; SUBCUTANEOUS at 14:31

## 2019-06-19 RX ADMIN — NEOSTIGMINE METHYLSULFATE 5 MG: 1 INJECTION, SOLUTION INTRAVENOUS at 12:07

## 2019-06-19 RX ADMIN — SODIUM CHLORIDE: 9 INJECTION, SOLUTION INTRAVENOUS at 15:17

## 2019-06-19 RX ADMIN — ONDANSETRON 4 MG: 2 INJECTION INTRAMUSCULAR; INTRAVENOUS at 14:28

## 2019-06-19 RX ADMIN — LIDOCAINE HYDROCHLORIDE 100 MG: 20 INJECTION, SOLUTION INFILTRATION; PERINEURAL at 10:58

## 2019-06-19 RX ADMIN — GLYCOPYRROLATE 0.8 MG: 0.2 INJECTION, SOLUTION INTRAMUSCULAR; INTRAVENOUS at 12:07

## 2019-06-19 RX ADMIN — MIDAZOLAM 2 MG: 1 INJECTION INTRAMUSCULAR; INTRAVENOUS at 10:52

## 2019-06-19 RX ADMIN — SUCCINYLCHOLINE CHLORIDE 140 MG: 20 INJECTION, SOLUTION INTRAMUSCULAR; INTRAVENOUS; PARENTERAL at 10:58

## 2019-06-19 RX ADMIN — ROCURONIUM BROMIDE 50 MG: 10 INJECTION INTRAVENOUS at 11:04

## 2019-06-19 RX ADMIN — PIPERACILLIN SODIUM,TAZOBACTAM SODIUM 3.38 G: 3; .375 INJECTION, POWDER, FOR SOLUTION INTRAVENOUS at 16:19

## 2019-06-19 RX ADMIN — ONDANSETRON 4 MG: 2 INJECTION INTRAMUSCULAR; INTRAVENOUS at 12:01

## 2019-06-19 RX ADMIN — ONDANSETRON 4 MG: 2 INJECTION INTRAMUSCULAR; INTRAVENOUS at 00:28

## 2019-06-19 RX ADMIN — SODIUM CHLORIDE: 9 INJECTION, SOLUTION INTRAVENOUS at 04:30

## 2019-06-19 RX ADMIN — ONDANSETRON 4 MG: 2 INJECTION INTRAMUSCULAR; INTRAVENOUS at 12:50

## 2019-06-19 RX ADMIN — SODIUM CHLORIDE, POTASSIUM CHLORIDE, SODIUM LACTATE AND CALCIUM CHLORIDE: 600; 310; 30; 20 INJECTION, SOLUTION INTRAVENOUS at 11:37

## 2019-06-19 ASSESSMENT — ACTIVITIES OF DAILY LIVING (ADL)
ADLS_ACUITY_SCORE: 15
ADLS_ACUITY_SCORE: 14
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
## 2019-06-19 NOTE — ANESTHESIA CARE TRANSFER NOTE
Patient: Katia Rizvi    Procedure(s):  CHOLECYSTECTOMY, LAPAROSCOPIC, WITH CHOLANGIOGRAM    Diagnosis: UNKNOWN  Diagnosis Additional Information: No value filed.    Anesthesia Type:   General, RSI, ETT     Note:  Airway :Face Mask  Patient transferred to:PACU  Comments: Patient awake, extubated, and transferred to PACU. VS stable and report given to RN. Handoff Report: Identifed the Patient, Identified the Reponsible Provider, Reviewed the pertinent medical history, Discussed the surgical course, Reviewed Intra-OP anesthesia mangement and issues during anesthesia, Set expectations for post-procedure period and Allowed opportunity for questions and acknowledgement of understanding      Vitals: (Last set prior to Anesthesia Care Transfer)    CRNA VITALS  6/19/2019 1202 - 6/19/2019 1243      6/19/2019             Resp Rate (set):  10                Electronically Signed By: JOE Murphy CRNA  June 19, 2019  12:43 PM

## 2019-06-19 NOTE — OP NOTE
General Surgery Operative Note    PREOPERATIVE DIAGNOSIS:  cholecystitis.    POSTOPERATIVE DIAGNOSIS:  Cholecystitis and choledocholithiasis     PROCEDURE:  Laparoscopic Cholecystectomy with intraoperative cholangiogram    SURGEON:  Reba Mattson MD    ASSISTANT:  Cyndy Irene PA-C  The physician s assistant was medically necessary for their expertise in camera management, suctioning and retraction.      ANESTHESIA:  General.    BLOOD LOSS: 20ml    FINDINGS: signs of early cholecystitis with wall thickening present. Cholangiogram with filling defect in proximal CBD with minimal contrast seen in duodenum.     INDICATIONS:   Ms. Rizvi presented with RUQ pain. She had an US which revealed findings consistent with cholecystitis. Her LFTs were mildly elevated and increasing and thus cholangiogram was recommended. Patient is presenting for laparoscopic cholecystectomy. I explained the risks, benefits, complications including but not limited to bleeding, infection, possible need to open, possible postop hematoma, seroma, bowel, bladder or bile duct injury, MI, PE, and if any of these occurred the patient would require additional procedures. The patient agreed and did sign consent.    DETAILS OF PROCEDURE:   A small skin incision was made in the left upper quadrant. A 5mm 0degree laparoscope was used with a visiport to obtain access to the abdomen. Insufflation was connected and flowed freely. Insufflation pressure was sent to 15mmhg. The abdomen was surveyed and no acute findings were seen. There was no injury from abdominal entrance noted. A 12mm port was placed in the midline just above the umbilicus. A 5mm 30 degree laparoscope was inserted and revealed no trocar injuries. Local was injected to the upper abdomen and two 5mm ports were placed in the right upper quadrant under direct visualization. The gallbladder was retracted superiorly and laterally. The gallbladder wall was mildly thickened and edematous. Cautery  was used to take down the peritoneal attachments. I was able to delineate the artery and duct and got under the undersurface of the gallbladder to help declinate the duct and artery. This was taken up high to confirm the critical view on multiple views.     A clip was placed distally on the cystic duct and a ductotomy created with the laparoscopic scissors. An angiocatheter was placed in the upper right abdomen and a cholangiogram catheter fed through the angiocath. The catheter was placed in the ductotomy and secured with a clip. With irrigation of sterile saline there was easy flow and no leaking. Fluoroscopy was brought onto the field and contrast injected into the catheter system. We were able to clearly see contrast in the common bile duct. The right and left hepatic ducts also filled with contrast. There was a filling defect noted in the proximal common bile duct at the level of the ampulla. A small amount of contrast entered the duodenum. Glucagon 1mg was administered. I attempted flushing the duct with saline and repeated the cholangiogram. there continued to be minimal duodenal uptake with a filling defect present. The cholangiogram catheter was removed and three clips placed proximally on the cystic duct. The duct was completely transected with laparoscopic scissors. The artery was clipped proximally with two clips and distally with one clip and transected.     The gallbladder was taken off the liver bed with cautery. There was no bile spillage or significant bleeding. The gallbladder was then placed in an Endocatch bag and removed through the umbilical trocar site. The camera was reinserted which showed no bleeding or bile spillage. Copious irrigation were used until clear. The wound bed was inspected multiple times showing that it was completely dry and that the clips were in place. The omentum was packed into the perihepatic fossa. The 12mm port fascia closed with 0 Vicryl in figure-of-eight fashion  using the jason katherine device. All gas was expelled and the trocars were taken out under direct visualization. Marcaine 0.5% were injected to all the wounds. The skin was closed with a 4-0 Monocryl in a subcuticular fashion. Steri-strips and sterile dressings were applied. The patient tolerated the procedure well. There were no complications. They were awoken from anesthesia and transferred to PACU in stable condition. All sponge, instrument and needle counts were correct.       SEVERO CORMIER MD    Please route or send letter to:  Primary Care Provider (PCP) and Referring Provider

## 2019-06-19 NOTE — PLAN OF CARE
DATE & TIME: 6/19/19                      Cognitive Concerns/ Orientation : Alert and Oriented x4   BEHAVIOR & AGGRESSION TOOL COLOR: Green  CIWA SCORE: NA    ABNL VS/O2: VSS on RA  MOBILITY: SBA  PAIN MANAGMENT: Patient received pain meds in ED, slept rest of shift.   DIET: NPO   BOWEL/BLADDER: Continent  ABNL LAB/BG: NA  DRAIN/DEVICES: PIV in left hand infusing   TELEMETRY RHYTHM: NA  SKIN: intact, rash to abdomen, had since Sunday when she first present to ED  TESTS/PROCEDURES: NA  D/C DAY/GOALS/PLACE: Pending  OTHER IMPORTANT INFO: Surgical consult.

## 2019-06-19 NOTE — PROGRESS NOTES
Admission    Patient arrives to room 619-2 via cart from ED.  Care plan note:     Inpatient nursing criteria listed below were met:    PCD's Documented: NA  Skin issues/needs documented :Yes  Isolation education started/completed NA  Patient allergies verified with patient: Yes  Verified completion of [place] Risk Assessment Tool:  Yes  Verified completion of Guardianship screening tool: Yes  Fall Prevention: Care plan updated, Education given and documented Yes  Care Plan initiated: Yes  Home medications documented in belongings flowsheet: Yes  Patient belongings documented in belongings flowsheet: Yes  Reminder note (belongings/ medications) placed in discharge instructions:Yes  Admission profile/ required documentation complete: Yes  Bedside Report Letter given and explained to patient Yes

## 2019-06-19 NOTE — PROGRESS NOTES
RECEIVING UNIT ED HANDOFF REVIEW    ED Nurse Handoff Report was reviewed by: Adriel Rudd on June 19, 2019 at 12:42 AM

## 2019-06-19 NOTE — PHARMACY-ADMISSION MEDICATION HISTORY
Admission medication history interview status for the 6/18/2019  admission is complete. See EPIC admission navigator for prior to admission medications     Medication history source reliability:Good    Actions taken by pharmacist (provider contacted, etc):interviewed patient     Additional medication history information not noted on PTA med list :None    Medication reconciliation/reorder completed by provider prior to medication history? No    Time spent in this activity: 10 minutes    Prior to Admission medications    Medication Sig Last Dose Taking? Auth Provider   aspirin-acetaminophen-caffeine (EXCEDRIN MIGRAINE) 250-250-65 MG tablet Take 2 tablets by mouth every 6 hours as needed for headaches 6/18/2019 at Unknown time Yes Unknown, Entered By History   Multiple Vitamins-Minerals (HAIR SKIN AND NAILS FORMULA) TABS Take 1 tablet by mouth daily as needed Past Week at Unknown time Yes Unknown, Entered By History   ondansetron (ZOFRAN ODT) 4 MG ODT tab Take 1 tablet (4 mg) by mouth every 8 hours as needed for nausea 6/18/2019 at Unknown time Yes Trigger, Teodoro Rueda MD   oxyCODONE (ROXICODONE) 5 MG tablet Take 1 tablet (5 mg) by mouth every 6 hours as needed for pain 6/18/2019 at Unknown time Yes Trigger, Teodoro Rueda MD

## 2019-06-19 NOTE — ED NOTES
"[facility]  ED Nurse Handoff Report    ED Chief complaint: Abdominal Pain      ED Diagnosis:   Final diagnoses:   Acute cholecystitis   Biliary obstruction       Code Status: Full Code    Allergies: No Known Allergies    Activity level - Baseline/Home:  Independent  Activity Level - Current:   Independent    Patient's Preferred language: English   Needed?: No    Isolation: No  Infection: Not Applicable  Bariatric?: Yes    Vital Signs:   Vitals:    06/18/19 2148   BP: 150/90   Pulse: 74   Resp: 25   Temp: 97.7  F (36.5  C)   TempSrc: Temporal   SpO2: 98%   Weight: 140.6 kg (310 lb)   Height: 1.575 m (5' 2\")       Cardiac Rhythm: ,        Pain level:      Is this patient confused?: No   Does this patient have a guardian?  No         If yes, is there guardianship documents in the Epic \"Code/ACP\" activity?  N/A         Guardian Notified?  N/A  [place] - Suicide Severity Rating Scale Completed?  Yes  If yes, what color did the patient score?  White    Patient Report: Initial Complaint: 24 year old female who presents with abdominal pain. The patient was here at [facility] 2 days ago and was diagnosed with cholelithiasis without biliary obstruction. Since, pain has worssened.    Focused Assessment:  Upon arrival, the patient states her pain dramatically worsened tonight and that eating makes it worse. The patient denies any fever, nausea, or vomiting. She last eat rice and green beans.     Tests Performed: labs  Abnormal Results:   Abnormal Labs Reviewed   CBC WITH PLATELETS DIFFERENTIAL - Abnormal; Notable for the following components:       Result Value    WBC 11.8 (*)     All other components within normal limits   COMPREHENSIVE METABOLIC PANEL - Abnormal; Notable for the following components:     (*)      (*)     All other components within normal limits     Treatments provided: Dilaudid 1 mg, zofran 4 mg, zosyn 3.375 g, 2 L NS bolus    Family Comments: " mother at bedside    OBS brochure/video discussed/provided to patient/family: No              Name of person given brochure if not patient: na              Relationship to patient: na    ED Medications:   Medications   0.9% sodium chloride BOLUS (0 mLs Intravenous Stopped 6/18/19 2317)     Followed by   sodium chloride 0.9% infusion (1,000 mLs Intravenous New Bag 6/18/19 2243)   ondansetron (ZOFRAN) injection 4 mg (4 mg Intravenous Given 6/19/19 0028)   HYDROmorphone (DILAUDID) injection 1 mg (1 mg Intravenous Given 6/19/19 0028)   ondansetron (ZOFRAN) injection 4 mg (has no administration in time range)   piperacillin-tazobactam (ZOSYN) 3.375 g vial to attach to  mL bag (3.375 g Intravenous New Bag 6/18/19 2240)       Drips infusing?:  No    For the majority of the shift this patient was Green.   Interventions performed were none.    Severe Sepsis OR Septic Shock Diagnosis Present: No    To be done/followed up on inpatient unit:  nothing noted at this time    ED NURSE PHONE NUMBER: [phone]

## 2019-06-19 NOTE — ED TRIAGE NOTES
Pt here Monday and was diagnosed with gallbladder issues. Pt reports since Monday the pain has been getting worse. Pt reports she has appt tomorrow with surgeon. Pt denies n/v. Pt taking oxycodone- last took 1 hour ago. Pt reports taking 4 tabs of 5mg oxycodone within 1 hour.

## 2019-06-19 NOTE — BRIEF OP NOTE
General Surgery Brief Operative Note    Pre-operative diagnosis: Cholelithiaisis   Post-operative diagnosis Same, probable choledocholithiasis   Procedure: Procedure(s):  CHOLECYSTECTOMY, LAPAROSCOPIC, WITH CHOLANGIOGRAM    Surgeon(s), Assistant(s): Surgeon(s) and Role:     * Reba Mattson MD - Primary      * Cyndy Irene PA-C - Assisting   Estimated blood loss: 20 mL   Drains: None   Specimens: ID Type Source Tests Collected by Time Destination   A : gallbladder and contents Tissue Gallbladder and Contents SURGICAL PATHOLOGY EXAM Reba Mattson MD 6/19/2019 11:40 AM       Findings: +IOC   Condition: Stable   Comments:      Cyndy Irene PA-C See dictated operative report for full details

## 2019-06-19 NOTE — ANESTHESIA POSTPROCEDURE EVALUATION
Patient: Katia Rizvi    Procedure(s):  CHOLECYSTECTOMY, LAPAROSCOPIC, WITH CHOLANGIOGRAM    Diagnosis:UNKNOWN  Diagnosis Additional Information: No value filed.    Anesthesia Type:  General, RSI, ETT    Note:  Anesthesia Post Evaluation    Patient location during evaluation: PACU  Patient participation: Able to fully participate in evaluation  Level of consciousness: sleepy but conscious and responsive to verbal stimuli  Pain management: adequate  Airway patency: patent  Cardiovascular status: acceptable and hemodynamically stable  Respiratory status: acceptable and unassisted  Hydration status: acceptable  PONV: none     Anesthetic complications: None          Last vitals:  Vitals:    06/19/19 1250 06/19/19 1300 06/19/19 1310   BP: 93/77 116/88 105/50   Pulse: 78 74 73   Resp: 19 16 18   Temp: 37.8  C (100  F) 37.7  C (99.9  F) 37.8  C (100  F)   SpO2: 97% 95% 98%         Electronically Signed By: Israel Godoy MD  June 19, 2019  1:20 PM

## 2019-06-19 NOTE — PROGRESS NOTES
"Nursing note  Pt back on the floor PACU around 1430. Pt c/o nausea and abdominal pain. Iv dilaudid and Zofran given. Lap sites dressing c/d/i.Blood pressure (!) 149/94, pulse 73, temperature 98.4  F (36.9  C), resp. rate 18, height 1.575 m (5' 2\"), weight 140.6 kg (310 lb), last menstrual period 06/07/2019, SpO2 94 %, unknown if currently breastfeeding.  pt will be on clear liquid tonight and NPO after MN for possible ERCP tomorrow. GI consult pending. Will continue to monitor.    "
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
## 2019-06-19 NOTE — PLAN OF CARE
Nursing note   Pt a/o x 4, up independently in the room. Pt denies any dizziness or lightheadedness. Pt denies any n/v. Pt c/o mild pain this morning 2/10 and declined pain medication. Pt went down to pre op for Laparoscopic Cholecystectomy w/intraoperative cholangiogram. Currently pt is not on the floor.
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
## 2019-06-19 NOTE — DISCHARGE INSTRUCTIONS
Oxycodone sent down to security to lock up.  Envelope # [phone].      [facility]  Discharge Instructions: Post-Operative Laparoscopic Cholecystectomy    ACTIVITY    Expect to feel tired after your surgery.  This will gradually resolve.      Take frequent, short walks and increase your activity gradually.      Avoid strenuous physical activity or heavy lifting greater than 15-20 lbs. for 2-3 weeks.  You may climb stairs.    You may drive without restrictions when you are not using any prescription pain medication and feel comfortable in a car.    You may return to work/school when you are comfortable without any prescription pain medication.    WOUND CARE    You may remove your outer dressing or Band-Aids and shower 48 hours after the surgery.  Pat your incisions dry and leave them open to air.  Re-apply dressing (Band-Aids or gauze/tape) as needed for comfort or drainage.    You may have steri-strips (looks like white tape) on your incision.  You may peel off the steri-strips 2 weeks after your surgery if they have not peeled off on their own.     Do not soak your incisions in a tub or pool for 2 weeks.     Do not apply any lotions, creams, or ointments to your incisions.    A ridge under your incisions is normal and will gradually resolve.    DIET    Start with liquids, then gradually resume your regular diet as tolerated.  Avoid heavy, spicy, and greasy meals for 2-3 days.    Drink plenty of fluids to stay hydrated.    It is not uncommon to experience some loose stools or diarrhea after surgery.  This is your body s way of adapting to the bile which will slowly drain into your intestine.  A low fat diet may help with this.  This should improve over 1-2 months.    PAIN    Expect some tenderness and discomfort at the incision sites.  Use the prescribed pain medication at your discretion.  Expect gradual resolution of your pain over several days.    You may take ibuprofen with  food (unless you have been told not to) instead of or in addition to your prescribed pain medication.  If you are taking Norco or Percocet, do not take any additional acetaminophen/APAP/Tylenol.    Do not drink alcohol or drive while you are taking pain medications.    You may apply ice to your incisions in 20 minute intervals as needed for the next 48 hours.  After that time, consider switching to heat if you prefer.    EXPECTATIONS    Pain medications can cause constipation.  Limit use when possible.  Take over the counter stool softener/stimulant, such as Colace or Senna, 1-2 times a day with plenty of water.  You may take a mild over the counter laxative, such as Miralax or a suppository, as needed.  You may discontinue these medications once you are having regular bowel movements and/or are no longer taking your narcotic pain medication.      You may have shoulder or upper back discomfort due to the gas used in surgery.  This is temporary and should resolve in 48-72 hours.  Short, frequent walks may help with this.    FOLLOW UP    Our office will contact you approximately 2 weeks to check on your progress and answer any questions you may have.  If you are doing well, you will not need to return for a follow up appointment.  If any concerns are identified over the phone, we will help you make an appointment to see a provider.     If you have not received a phone call, have any questions or concerns, or would like to be seen, please call us at [phone] and ask to speak with our nurse.  We are located at [address] [place] [address].    CALL OUR OFFICE -[phone] IF YOU HAVE:     Chills or fever above 101 F.    Increased redness, warmth, or drainage at your incisions.    Significant bleeding.    Pain not relieved by your pain medication or rest.    Increasing pain after the first 48 hours.    Any other concerns or questions.    Revised January 2018

## 2019-06-20 ENCOUNTER — ANESTHESIA (OUTPATIENT)
Dept: SURGERY | Facility: CLINIC | Age: 24
DRG: 418 | End: 2019-06-20
Payer: COMMERCIAL

## 2019-06-20 ENCOUNTER — APPOINTMENT (OUTPATIENT)
Dept: GENERAL RADIOLOGY | Facility: CLINIC | Age: 24
DRG: 418 | End: 2019-06-20
Attending: HOSPITALIST
Payer: COMMERCIAL

## 2019-06-20 ENCOUNTER — ANESTHESIA EVENT (OUTPATIENT)
Dept: SURGERY | Facility: CLINIC | Age: 24
DRG: 418 | End: 2019-06-20
Payer: COMMERCIAL

## 2019-06-20 VITALS
HEART RATE: 75 BPM | RESPIRATION RATE: 16 BRPM | BODY MASS INDEX: 53.92 KG/M2 | TEMPERATURE: 98.9 F | WEIGHT: 293 LBS | SYSTOLIC BLOOD PRESSURE: 122 MMHG | OXYGEN SATURATION: 94 % | DIASTOLIC BLOOD PRESSURE: 75 MMHG | HEIGHT: 62 IN

## 2019-06-20 LAB
ALBUMIN SERPL-MCNC: 3.1 G/DL (ref 3.4–5)
ALP SERPL-CCNC: 93 U/L (ref 40–150)
ALT SERPL W P-5'-P-CCNC: 337 U/L (ref 0–50)
AST SERPL W P-5'-P-CCNC: 166 U/L (ref 0–45)
BILIRUB DIRECT SERPL-MCNC: 0.1 MG/DL (ref 0–0.2)
BILIRUB SERPL-MCNC: 0.7 MG/DL (ref 0.2–1.3)
COPATH REPORT: NORMAL
ERYTHROCYTE [DISTWIDTH] IN BLOOD BY AUTOMATED COUNT: 12.9 % (ref 10–15)
HCG UR QL: NEGATIVE
HCT VFR BLD AUTO: 34.1 % (ref 35–47)
HGB BLD-MCNC: 11.6 G/DL (ref 11.7–15.7)
MCH RBC QN AUTO: 30.4 PG (ref 26.5–33)
MCHC RBC AUTO-ENTMCNC: 34 G/DL (ref 31.5–36.5)
MCV RBC AUTO: 90 FL (ref 78–100)
PLATELET # BLD AUTO: 247 10E9/L (ref 150–450)
PROT SERPL-MCNC: 6.1 G/DL (ref 6.8–8.8)
RBC # BLD AUTO: 3.81 10E12/L (ref 3.8–5.2)
WBC # BLD AUTO: 10.8 10E9/L (ref 4–11)

## 2019-06-20 PROCEDURE — 27210286 ZZH BALLOON ADDITIONAL: Performed by: INTERNAL MEDICINE

## 2019-06-20 PROCEDURE — 25800030 ZZH RX IP 258 OP 636: Performed by: NURSE ANESTHETIST, CERTIFIED REGISTERED

## 2019-06-20 PROCEDURE — 25000125 ZZHC RX 250: Performed by: NURSE ANESTHETIST, CERTIFIED REGISTERED

## 2019-06-20 PROCEDURE — 40000277 XR SURGERY CARM FLUORO LESS THAN 5 MIN W STILLS

## 2019-06-20 PROCEDURE — 25000128 H RX IP 250 OP 636: Performed by: ANESTHESIOLOGY

## 2019-06-20 PROCEDURE — 25800030 ZZH RX IP 258 OP 636: Performed by: PHYSICIAN ASSISTANT

## 2019-06-20 PROCEDURE — 40000170 ZZH STATISTIC PRE-PROCEDURE ASSESSMENT II: Performed by: INTERNAL MEDICINE

## 2019-06-20 PROCEDURE — C1887 CATHETER, GUIDING: HCPCS | Performed by: INTERNAL MEDICINE

## 2019-06-20 PROCEDURE — 71000012 ZZH RECOVERY PHASE 1 LEVEL 1 FIRST HR: Performed by: INTERNAL MEDICINE

## 2019-06-20 PROCEDURE — 99239 HOSP IP/OBS DSCHRG MGMT >30: CPT | Performed by: INTERNAL MEDICINE

## 2019-06-20 PROCEDURE — 25000132 ZZH RX MED GY IP 250 OP 250 PS 637: Performed by: INTERNAL MEDICINE

## 2019-06-20 PROCEDURE — 81025 URINE PREGNANCY TEST: CPT | Performed by: INTERNAL MEDICINE

## 2019-06-20 PROCEDURE — 80076 HEPATIC FUNCTION PANEL: CPT | Performed by: PHYSICIAN ASSISTANT

## 2019-06-20 PROCEDURE — 25000128 H RX IP 250 OP 636: Performed by: INTERNAL MEDICINE

## 2019-06-20 PROCEDURE — 25000128 H RX IP 250 OP 636: Performed by: NURSE ANESTHETIST, CERTIFIED REGISTERED

## 2019-06-20 PROCEDURE — 36415 COLL VENOUS BLD VENIPUNCTURE: CPT | Performed by: PHYSICIAN ASSISTANT

## 2019-06-20 PROCEDURE — 25000128 H RX IP 250 OP 636: Performed by: PHYSICIAN ASSISTANT

## 2019-06-20 PROCEDURE — 0F798ZZ DILATION OF COMMON BILE DUCT, VIA NATURAL OR ARTIFICIAL OPENING ENDOSCOPIC: ICD-10-PCS | Performed by: INTERNAL MEDICINE

## 2019-06-20 PROCEDURE — 36000058 ZZH SURGERY LEVEL 3 EA 15 ADDTL MIN: Performed by: INTERNAL MEDICINE

## 2019-06-20 PROCEDURE — 25000132 ZZH RX MED GY IP 250 OP 250 PS 637: Performed by: PHYSICIAN ASSISTANT

## 2019-06-20 PROCEDURE — 25000125 ZZHC RX 250: Performed by: PHYSICIAN ASSISTANT

## 2019-06-20 PROCEDURE — 36000060 ZZH SURGERY LEVEL 3 W FLUORO 1ST 30 MIN: Performed by: INTERNAL MEDICINE

## 2019-06-20 PROCEDURE — 37000008 ZZH ANESTHESIA TECHNICAL FEE, 1ST 30 MIN: Performed by: INTERNAL MEDICINE

## 2019-06-20 PROCEDURE — 25000566 ZZH SEVOFLURANE, EA 15 MIN: Performed by: INTERNAL MEDICINE

## 2019-06-20 PROCEDURE — 85027 COMPLETE CBC AUTOMATED: CPT | Performed by: PHYSICIAN ASSISTANT

## 2019-06-20 PROCEDURE — 37000009 ZZH ANESTHESIA TECHNICAL FEE, EACH ADDTL 15 MIN: Performed by: INTERNAL MEDICINE

## 2019-06-20 RX ORDER — AMOXICILLIN 250 MG
1 CAPSULE ORAL 2 TIMES DAILY
Status: DISCONTINUED | OUTPATIENT
Start: 2019-06-20 | End: 2019-06-20 | Stop reason: HOSPADM

## 2019-06-20 RX ORDER — PROPOFOL 10 MG/ML
INJECTION, EMULSION INTRAVENOUS CONTINUOUS PRN
Status: DISCONTINUED | OUTPATIENT
Start: 2019-06-20 | End: 2019-06-20

## 2019-06-20 RX ORDER — ONDANSETRON 2 MG/ML
4 INJECTION INTRAMUSCULAR; INTRAVENOUS EVERY 30 MIN PRN
Status: DISCONTINUED | OUTPATIENT
Start: 2019-06-20 | End: 2019-06-20 | Stop reason: HOSPADM

## 2019-06-20 RX ORDER — AMOXICILLIN 250 MG
1 CAPSULE ORAL 2 TIMES DAILY
Qty: 15 TABLET | Refills: 0 | Status: SHIPPED | OUTPATIENT
Start: 2019-06-20 | End: 2021-01-22

## 2019-06-20 RX ORDER — NALOXONE HYDROCHLORIDE 0.4 MG/ML
.1-.4 INJECTION, SOLUTION INTRAMUSCULAR; INTRAVENOUS; SUBCUTANEOUS
Status: DISCONTINUED | OUTPATIENT
Start: 2019-06-20 | End: 2019-06-20 | Stop reason: HOSPADM

## 2019-06-20 RX ORDER — HYDROMORPHONE HYDROCHLORIDE 2 MG/1
2-4 TABLET ORAL EVERY 4 HOURS PRN
Status: DISCONTINUED | OUTPATIENT
Start: 2019-06-20 | End: 2019-06-20 | Stop reason: HOSPADM

## 2019-06-20 RX ORDER — LIDOCAINE HYDROCHLORIDE 20 MG/ML
INJECTION, SOLUTION INFILTRATION; PERINEURAL PRN
Status: DISCONTINUED | OUTPATIENT
Start: 2019-06-20 | End: 2019-06-20

## 2019-06-20 RX ORDER — FENTANYL CITRATE 50 UG/ML
INJECTION, SOLUTION INTRAMUSCULAR; INTRAVENOUS PRN
Status: DISCONTINUED | OUTPATIENT
Start: 2019-06-20 | End: 2019-06-20

## 2019-06-20 RX ORDER — SODIUM CHLORIDE, SODIUM LACTATE, POTASSIUM CHLORIDE, CALCIUM CHLORIDE 600; 310; 30; 20 MG/100ML; MG/100ML; MG/100ML; MG/100ML
INJECTION, SOLUTION INTRAVENOUS CONTINUOUS
Status: DISCONTINUED | OUTPATIENT
Start: 2019-06-20 | End: 2019-06-20 | Stop reason: HOSPADM

## 2019-06-20 RX ORDER — LIDOCAINE 40 MG/G
CREAM TOPICAL
Status: DISCONTINUED | OUTPATIENT
Start: 2019-06-20 | End: 2019-06-20 | Stop reason: HOSPADM

## 2019-06-20 RX ORDER — HYDROMORPHONE HYDROCHLORIDE 2 MG/1
2-4 TABLET ORAL EVERY 4 HOURS PRN
Qty: 15 TABLET | Refills: 0 | Status: SHIPPED | OUTPATIENT
Start: 2019-06-20 | End: 2021-01-22

## 2019-06-20 RX ORDER — SODIUM CHLORIDE, SODIUM LACTATE, POTASSIUM CHLORIDE, CALCIUM CHLORIDE 600; 310; 30; 20 MG/100ML; MG/100ML; MG/100ML; MG/100ML
INJECTION, SOLUTION INTRAVENOUS CONTINUOUS
Status: DISCONTINUED | OUTPATIENT
Start: 2019-06-20 | End: 2019-06-20

## 2019-06-20 RX ORDER — NALOXONE HYDROCHLORIDE 0.4 MG/ML
.1-.4 INJECTION, SOLUTION INTRAMUSCULAR; INTRAVENOUS; SUBCUTANEOUS
Status: CANCELLED | OUTPATIENT
Start: 2019-06-20 | End: 2019-06-21

## 2019-06-20 RX ORDER — INDOMETHACIN 50 MG/1
100 SUPPOSITORY RECTAL
Status: DISCONTINUED | OUTPATIENT
Start: 2019-06-20 | End: 2019-06-20 | Stop reason: HOSPADM

## 2019-06-20 RX ORDER — FLUMAZENIL 0.1 MG/ML
0.2 INJECTION, SOLUTION INTRAVENOUS
Status: DISCONTINUED | OUTPATIENT
Start: 2019-06-20 | End: 2019-06-20 | Stop reason: HOSPADM

## 2019-06-20 RX ORDER — ONDANSETRON 2 MG/ML
INJECTION INTRAMUSCULAR; INTRAVENOUS PRN
Status: DISCONTINUED | OUTPATIENT
Start: 2019-06-20 | End: 2019-06-20

## 2019-06-20 RX ORDER — SODIUM CHLORIDE, SODIUM LACTATE, POTASSIUM CHLORIDE, CALCIUM CHLORIDE 600; 310; 30; 20 MG/100ML; MG/100ML; MG/100ML; MG/100ML
INJECTION, SOLUTION INTRAVENOUS CONTINUOUS PRN
Status: DISCONTINUED | OUTPATIENT
Start: 2019-06-20 | End: 2019-06-20

## 2019-06-20 RX ORDER — PROPOFOL 10 MG/ML
INJECTION, EMULSION INTRAVENOUS PRN
Status: DISCONTINUED | OUTPATIENT
Start: 2019-06-20 | End: 2019-06-20

## 2019-06-20 RX ORDER — ONDANSETRON 4 MG/1
4 TABLET, ORALLY DISINTEGRATING ORAL EVERY 30 MIN PRN
Status: DISCONTINUED | OUTPATIENT
Start: 2019-06-20 | End: 2019-06-20 | Stop reason: HOSPADM

## 2019-06-20 RX ORDER — FENTANYL CITRATE 50 UG/ML
25-50 INJECTION, SOLUTION INTRAMUSCULAR; INTRAVENOUS
Status: DISCONTINUED | OUTPATIENT
Start: 2019-06-20 | End: 2019-06-20 | Stop reason: HOSPADM

## 2019-06-20 RX ORDER — DEXAMETHASONE SODIUM PHOSPHATE 4 MG/ML
INJECTION, SOLUTION INTRA-ARTICULAR; INTRALESIONAL; INTRAMUSCULAR; INTRAVENOUS; SOFT TISSUE PRN
Status: DISCONTINUED | OUTPATIENT
Start: 2019-06-20 | End: 2019-06-20

## 2019-06-20 RX ORDER — HYDROMORPHONE HYDROCHLORIDE 1 MG/ML
.3-.5 INJECTION, SOLUTION INTRAMUSCULAR; INTRAVENOUS; SUBCUTANEOUS EVERY 5 MIN PRN
Status: DISCONTINUED | OUTPATIENT
Start: 2019-06-20 | End: 2019-06-20 | Stop reason: HOSPADM

## 2019-06-20 RX ADMIN — MIDAZOLAM 2 MG: 1 INJECTION INTRAMUSCULAR; INTRAVENOUS at 12:07

## 2019-06-20 RX ADMIN — PHENYLEPHRINE HYDROCHLORIDE 100 MCG: 10 INJECTION INTRAVENOUS at 12:25

## 2019-06-20 RX ADMIN — SODIUM CHLORIDE, POTASSIUM CHLORIDE, SODIUM LACTATE AND CALCIUM CHLORIDE: 600; 310; 30; 20 INJECTION, SOLUTION INTRAVENOUS at 12:01

## 2019-06-20 RX ADMIN — FAMOTIDINE 20 MG: 10 INJECTION, SOLUTION INTRAVENOUS at 01:38

## 2019-06-20 RX ADMIN — HYDROMORPHONE HYDROCHLORIDE 0.3 MG: 1 INJECTION, SOLUTION INTRAMUSCULAR; INTRAVENOUS; SUBCUTANEOUS at 08:05

## 2019-06-20 RX ADMIN — PROCHLORPERAZINE EDISYLATE 5 MG: 5 INJECTION INTRAMUSCULAR; INTRAVENOUS at 08:04

## 2019-06-20 RX ADMIN — PHENYLEPHRINE HYDROCHLORIDE 100 MCG: 10 INJECTION INTRAVENOUS at 12:35

## 2019-06-20 RX ADMIN — SUCCINYLCHOLINE CHLORIDE 140 MG: 20 INJECTION, SOLUTION INTRAMUSCULAR; INTRAVENOUS; PARENTERAL at 12:07

## 2019-06-20 RX ADMIN — HYDROMORPHONE HYDROCHLORIDE 0.3 MG: 1 INJECTION, SOLUTION INTRAMUSCULAR; INTRAVENOUS; SUBCUTANEOUS at 10:44

## 2019-06-20 RX ADMIN — PROCHLORPERAZINE MALEATE 5 MG: 5 TABLET, FILM COATED ORAL at 00:16

## 2019-06-20 RX ADMIN — SODIUM CHLORIDE: 9 INJECTION, SOLUTION INTRAVENOUS at 01:42

## 2019-06-20 RX ADMIN — FENTANYL CITRATE 50 MCG: 50 INJECTION, SOLUTION INTRAMUSCULAR; INTRAVENOUS at 12:07

## 2019-06-20 RX ADMIN — FENTANYL CITRATE 50 MCG: 50 INJECTION, SOLUTION INTRAMUSCULAR; INTRAVENOUS at 12:51

## 2019-06-20 RX ADMIN — HYDROMORPHONE HYDROCHLORIDE 0.3 MG: 1 INJECTION, SOLUTION INTRAMUSCULAR; INTRAVENOUS; SUBCUTANEOUS at 13:44

## 2019-06-20 RX ADMIN — ACETAMINOPHEN 650 MG: 325 TABLET, FILM COATED ORAL at 00:16

## 2019-06-20 RX ADMIN — PIPERACILLIN SODIUM,TAZOBACTAM SODIUM 3.38 G: 3; .375 INJECTION, POWDER, FOR SOLUTION INTRAVENOUS at 10:11

## 2019-06-20 RX ADMIN — Medication 1 LOZENGE: at 14:09

## 2019-06-20 RX ADMIN — LIDOCAINE HYDROCHLORIDE 100 MG: 20 INJECTION, SOLUTION INFILTRATION; PERINEURAL at 12:07

## 2019-06-20 RX ADMIN — PHENYLEPHRINE HYDROCHLORIDE 100 MCG: 10 INJECTION INTRAVENOUS at 12:43

## 2019-06-20 RX ADMIN — ACETAMINOPHEN 650 MG: 325 TABLET, FILM COATED ORAL at 14:37

## 2019-06-20 RX ADMIN — ONDANSETRON 4 MG: 2 INJECTION INTRAMUSCULAR; INTRAVENOUS at 12:33

## 2019-06-20 RX ADMIN — DEXAMETHASONE SODIUM PHOSPHATE 4 MG: 4 INJECTION, SOLUTION INTRA-ARTICULAR; INTRALESIONAL; INTRAMUSCULAR; INTRAVENOUS; SOFT TISSUE at 12:22

## 2019-06-20 RX ADMIN — PROPOFOL 50 MCG/KG/MIN: 10 INJECTION, EMULSION INTRAVENOUS at 12:22

## 2019-06-20 RX ADMIN — PIPERACILLIN SODIUM,TAZOBACTAM SODIUM 3.38 G: 3; .375 INJECTION, POWDER, FOR SOLUTION INTRAVENOUS at 04:29

## 2019-06-20 RX ADMIN — PROPOFOL 200 MG: 10 INJECTION, EMULSION INTRAVENOUS at 12:07

## 2019-06-20 ASSESSMENT — ACTIVITIES OF DAILY LIVING (ADL)
ADLS_ACUITY_SCORE: 9
ADLS_ACUITY_SCORE: 14
ADLS_ACUITY_SCORE: 10
ADLS_ACUITY_SCORE: 10

## 2019-06-20 NOTE — PROGRESS NOTES
DATE & TIME: 2300-0730 6/20/19    Cognitive Concerns/ Orientation : A/Ox4   BEHAVIOR & AGGRESSION TOOL COLOR: GREEN  CIWA SCORE:    ABNL VS/O2: VSS. RA  MOBILITY: IND  PAIN MANAGMENT: headache pain; tylenol given   DIET: NPO.   BOWEL/BLADDER: up to bathroom   ABNL LAB/BG:   DRAIN/DEVICES: CAPNO on. IVF @100  TELEMETRY RHYTHM:   SKIN: incision sites covered with bandaids   TESTS/PROCEDURES:  ERCP @ noon today   D/C DAY/GOALS/PLACE: Discharge in 1-2 days   OTHER IMPORTANT INFO:
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
## 2019-06-20 NOTE — PROGRESS NOTES
"[facility]  GENERAL SURGERY Progress Note    Admission Date: 2019         Assessment and Plan:   Katia Rizvi is a 24 year old female S/P Procedure(s): lap lucian with +IOC, POD 1.  - NPO for ERCP today  - Discontinue Oxy, try dilaudid  - Continue Zosyn for today  - Continue Pepcid  - Ambulate 4x day and encourage IS  - Med mngt per hospitalist, appreciate your help  - Home when medically able  - Discharge instructions discussed and on chart              Interval History:   Oxy not effective for pain control, urinating without difficulty, NPO.                     Physical Exam:   Blood pressure 129/75, pulse 75, temperature 98.3  F (36.8  C), temperature source Oral, resp. rate 18, height 1.575 m (5' 2\"), weight 140.6 kg (310 lb), last menstrual period 2019, SpO2 98 %, unknown if currently breastfeeding.  Temperature Temp  Av.4  F (37.4  C)  Min: 98.3  F (36.8  C)  Max: 100  F (37.8  C)   I/O last 3 completed shifts:  In: 1500 [I.V.:1500]  Out: 770 [Urine:750; Blood:20]  Constitutional:  Awake, alert, oriented, and in no apparent distress.   Abdomen: Soft, non-distended, appropriately tender at incision(s).   Wound(s): Clean, dry, and intact. No erythema or drainage.    Extremities: No edema or calf tenderness. +SCDs          Data:     Recent Labs   Lab Test 19  0853 19  0613 19  1134   WBC 10.8 9.3 11.8* 12.0*   HGB 11.6*  --  13.9 14.0   HCT 34.1*  --  40.6 40.6     --  325 334      Recent Labs   Lab Test 19  0853 19  0613 19  2214 19  1134 18  0444   BILITOTAL 0.7 0.5 0.4 0.4 0.2   DBIL 0.1 0.1  --   --   --    * 207* 147* 23 14   * 184* 115* 9 15   ALKPHOS 93 81 92 73 73   LIPASE  --   --  148 115 183     DAVID [facility]  [phone]  "

## 2019-06-20 NOTE — PROGRESS NOTES
ERCP: Common bile duct stone removed after sphincterotomy.  No further retained stone or sludge in the common bile duct.  Plan: Clear liquid diet today.  Patient can be discharged from GI standpoint.    Uzair Banks MD

## 2019-06-20 NOTE — PLAN OF CARE
Pt tolerated clear liquid diet. Pt refused 1600 IV antibiotic, present IV site infiltrated and pt refused restart of IV. Pt states she has a home medication locked up, but we were unable to find security slip, and family had a copy of it out in their car and did not want to go get it, pt said she would be fine without it and that there was only 4 pills left. Discharging to home.

## 2019-06-20 NOTE — ANESTHESIA CARE TRANSFER NOTE
Patient: Katia Rizvi    Procedure(s):  ENDOSCOPIC RETROGRADE CHOLANGIOPANCREATOGRAPHY    Diagnosis: STONES  Diagnosis Additional Information: No value filed.    Anesthesia Type:   General, ETT     Note:  Airway :Face Mask  Patient transferred to:PACU  Comments: Neuromuscular blockade not used after succinylcholine for intubation, spontaneous return of TOF 4/4 with sustained tetany, spontaneous respirations, adequate tidal volumes, followed commands to voice, oropharynx suctioned with soft flexible catheter, extubated atraumatically, extubated with suction, airway patent after extubation.  Oxygen via facemask at 10 liters per minute to PACU. Oxygen tubing connected to wall O2 in PACU, SpO2, NiBP, and EKG monitors and alarms on and functioning, Rex Hugger warmer connected to patient gown, report on patient's clinical status given to PACU RN. Handoff Report: Identifed the Patient, Identified the Reponsible Provider, Reviewed the pertinent medical history, Discussed the surgical course, Reviewed Intra-OP anesthesia mangement and issues during anesthesia, Set expectations for post-procedure period and Allowed opportunity for questions and acknowledgement of understanding      Vitals: (Last set prior to Anesthesia Care Transfer)    CRNA VITALS  6/20/2019 1229 - 6/20/2019 1304      6/20/2019             NIBP:  113/73    Pulse:  86    NIBP Mean:  78    SpO2:  90 %    Resp Rate (set):  10                Electronically Signed By: JOE Hester CRNA  June 20, 2019  1:04 PM

## 2019-06-20 NOTE — PROGRESS NOTES
[facility]  Gastroenterology Progress Note     Katia Rizvi MRN# [id number]   YOB: 1995 Age: 24 year old          Assessment and Plan:     Acute cholecystitis    Choledocholithiasis  Patient is doing better patient is still complaining of significant abdominal pain patient is status post cholecystectomy with Intra-Op cholangiogram consistent with retained stone in the common bile duct.  Plan is to proceed with a ERCP today.  Risks benefits and plan were discussed in detail with the patient.              Acute cholecystitis  Biliary obstruction  Acute post-operative pain      Interval History:   doing well; no cp, sob, n/v/d, or abd pain.              Review of Systems:   C: NEGATIVE for fever, chills, change in weight  E/M: NEGATIVE for ear, mouth and throat problems  R: NEGATIVE for significant cough or SOB  CV: NEGATIVE for chest pain, palpitations or peripheral edema             Medications:   I have reviewed this patient's current medications    famotidine  20 mg Intravenous Q12H     piperacillin-tazobactam  3.375 g Intravenous Q6H     senna-docusate  1 tablet Oral BID                  Physical Exam:   Vitals were reviewed  Vital Signs with Ranges  Temp:  [97.7  F (36.5  C)-99  F (37.2  C)] 99  F (37.2  C)  Pulse:  [57-86] 57  Heart Rate:  [63-86] 64  Resp:  [12-18] 14  BP: (107-149)/(50-94) 112/72  SpO2:  [91 %-99 %] 98 %  I/O last 3 completed shifts:  In: 1500 [I.V.:1500]  Out: 770 [Urine:750; Blood:20]  Constitutional: healthy, alert and no distress   Cardiovascular: negative, PMI normal. No lifts, heaves, or thrills. RRR. No murmurs, clicks gallops or rub  Respiratory: negative, Percussion normal. Good diaphragmatic excursion. Lungs clear  Head: Normocephalic. No masses, lesions, tenderness or abnormalities  Neck: Neck supple. No adenopathy. Thyroid symmetric, normal size,, Carotids without bruits.  Abdomen: Abdomen soft, non-tender. BS normal. No masses,  organomegaly  SKIN: no suspicious lesions or rashes           Data:   I reviewed the patient's new clinical lab test results.   Recent Labs   Lab Test 06/20/19  0853 06/19/19  0613 06/18/19 2214 06/16/19  1134   WBC 10.8 9.3 11.8* 12.0*   HGB 11.6*  --  13.9 14.0   MCV 90  --  89 88     --  325 334     Recent Labs   Lab Test 06/19/19  0613 06/18/19 2214 06/16/19  1134   POTASSIUM 4.2 3.9 4.2   CHLORIDE 110* 108 109   CO2 30 28 24   BUN 13 13 15   ANIONGAP 1* 7 6     Recent Labs   Lab Test 06/20/19  0853 06/19/19  0613 06/18/19 2214 06/16/19  1134 06/16/19  1125 09/15/18  1647 09/04/18  0545 09/04/18  0444   ALBUMIN 3.1* 3.3* 4.0 3.8  --   --   --  2.7*   BILITOTAL 0.7 0.5 0.4 0.4  --   --   --  0.2   * 207* 147* 23  --   --   --  14   * 184* 115* 9  --   --   --  15   PROTEIN  --   --   --   --  Negative 10* 30*  --    LIPASE  --   --  148 115  --   --   --  183       I reviewed the patient's new imaging results.    All laboratory data reviewed  All imaging studies reviewed by me.    Uzair Banks MD,  6/20/2019  [facility]  Office : [phone]  Cell: [phone]

## 2019-06-20 NOTE — PLAN OF CARE
DATE & TIME: 6/19/2019 3148-1709    Cognitive Concerns/ Orientation : A+Ox4   BEHAVIOR & AGGRESSION TOOL COLOR: Calm and Cooperative  CIWA SCORE: N/A   ABNL VS/O2: RA  MOBILITY: Independent  PAIN MANAGMENT: Oxycodone 10mg q3 x3  DIET: Clears/ now NPO  BOWEL/BLADDER: Independent passing gas urinating well  ABNL LAB/BG: None  DRAIN/DEVICES: None  TELEMETRY RHYTHM: NA  SKIN: 4 Lap/Livier incisions  TESTS/PROCEDURES: More surgery tomorrow  D/C DAY/GOALS/PLACE: TBD  OTHER IMPORTANT INFO: Very nauseous after surgery

## 2019-06-20 NOTE — PLAN OF CARE
DATE & TIME: 06/20/2019 7738-8538                Cognitive Concerns/ Orientation : A/Ox4   BEHAVIOR & AGGRESSION TOOL COLOR: GREEN  CIWA SCORE:           ABNL VS/O2: VSS. RA  MOBILITY: IND  PAIN MANAGMENT: headache pain; incision pain, IV dilaudid given. Pt prefers compazine for nausea.   DIET: Clear liq.   BOWEL/BLADDER: up to bathroom   ABNL LAB/BG: ALT & AST elevated   DRAIN/DEVICES: CAPNO on. IVF @100  TELEMETRY RHYTHM: Regular   SKIN: incision sites covered with bandaids   TESTS/PROCEDURES:Currently at ERCP    D/C DAY/GOALS/PLACE:Discharge possible today, depending upon ERCP outcome.    OTHER IMPORTANT INFO:Possible stent placement with ERCP. Still on IV Zosyn.

## 2019-06-20 NOTE — ANESTHESIA POSTPROCEDURE EVALUATION
Patient: Katia Rizvi    Procedure(s):  ENDOSCOPIC RETROGRADE CHOLANGIOPANCREATOGRAPHY    Diagnosis:STONES  Diagnosis Additional Information: No value filed.    Anesthesia Type:  General, ETT    Note:  Anesthesia Post Evaluation    Patient location during evaluation: PACU  Patient participation: Able to fully participate in evaluation  Level of consciousness: awake and alert  Pain management: adequate  Airway patency: patent  Cardiovascular status: acceptable and hemodynamically stable  Respiratory status: acceptable and unassisted  Hydration status: acceptable  PONV: none             Last vitals:  Vitals:    06/20/19 1350 06/20/19 1400 06/20/19 1431   BP: 115/67 112/72 122/75   Pulse: 62 57 75   Resp: 15 14 16   Temp:   37.2  C (98.9  F)   SpO2: 97% 98% 94%         Electronically Signed By: Joseph Rea DO  June 20, 2019  4:53 PM
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
## 2019-06-21 NOTE — PROGRESS NOTES
Discharge    Patient discharged to home via wheelchair with family  Care plan note: See POC note    Listed belongings gathered and returned to patient. Yes  Care Plan and Patient education resolved: Yes  Prescriptions if needed, hard copies sent with patient  NA  Home and hospital acquired medications returned to patient: NA  Medication Bin checked and emptied on discharge Yes  Follow up appointment made for patient: Yes

## 2019-07-10 ENCOUNTER — TELEPHONE (OUTPATIENT)
Dept: SURGERY | Facility: CLINIC | Age: 24
End: 2019-07-10

## 2019-07-10 NOTE — TELEPHONE ENCOUNTER
[facility]  Post op call note - No Answer    Katia Rizvi was called for an update regarding her recovery.  There was no answer and there was no option to leave a message.    Cyndy Irene PA-C

## 2020-08-07 NOTE — MR AVS SNAPSHOT
After Visit Summary   11/5/2018    Katia Rizvi    MRN: [id number]           Patient Information     Date Of Birth          1995        Visit Information        Provider Department      11/5/2018 3:30 PM Kaylyn Kinney MD [facility] Maternal Fetal Medicine  Fred        Today's Diagnoses     Obesity affecting pregnancy in third trimester    -  1    Post-term pregnancy, 40-42 weeks of gestation           Follow-ups after your visit        Your next 10 appointments already scheduled     Nov 07, 2018  3:00 PM CST   MFKAYDEN BPP SINGLE with [id number]   [facility] Maternal Fetal Medicine Ultrasound - [facility])    [address]  [facility] [address]   [phone]            Nov 07, 2018  3:30 PM CST   Radiology MD with UR PATRICK LANDRY   [facility] Maternal Fetal Medicine - [facility])    [address]  [facility] [address]   [phone]           Please arrive at the time given for your first appointment. This visit is used internally to schedule the physician's time during your ultrasound.              Future tests that were ordered for you today     Open Future Orders        Priority Expected Expires Ordered    M BPP Single Routine 11/8/2018 9/5/2019 11/5/2018            Who to contact     If you have questions or need follow up information about today's clinic visit or your schedule please contact [employer] [facility] directly at [phone].  Normal or non-critical lab and imaging results will be communicated to you by MyChart, letter or phone within 4 business days after the clinic has received the results. If you do not hear from us within 7 days, please contact the clinic through MyChart or phone. If you have a critical or abnormal lab result, we will notify you by phone as soon as possible.  Submit refill requests through [employer] or call your pharmacy and  [facility] Hospital Medicine  Discharge Summary      Patient Name: Joo York  MRN: [id number]  Admission Date: 7/18/2020  Hospital Length of Stay: 20 days  Discharge Date and Time:  08/07/2020 12:12 PM  Attending Physician: Melissa Lyons MD   Discharging Provider: Melissa Lyons MD  Primary Care Provider: Keegan Watson MD      HPI:   Joo York is a 81 y.o. male with HTN who presented with with report of shortness of breath, fever, chills last 3 days.  He was tested for COVID-19 2 days ago and positive.  He said dyspnea with exertion and a cough that started today.  Denies hemoptysis, stridor, or night sweats.  No nausea, vomiting diarrhea reported.  Patient is a former smoker.  Denies home oxygen use.  Code status discussed in the most remain a DNR DNI.  The family is aware of these wishes.  History is limited to respiratory distress as patient is speaking in short sentences.    In the emergency department routine laboratory studies and chest x-ray obtained.  Evidence of bilateral pneumonia.  Was supplemental oxygen and dexamethasone.  O2 sat 67% air prior to oxygen supplementation.  Okay to escalate to CPAP/BiPAP as.  Does not want to be intubated.    Hospital medicine has been asked to admit to inpatient in the ICU for further evaluation and treatment.     * No surgery found *      Hospital Course:   Mr. York presented with shortness of breath and fever.  He was admitted for acute respiratory failure secondary to COVID-19. Placed on BiPAP. Code status DNR. Completed course of empiric antibiotics for elevated procalcitonin level, course of dexamethasone 6 mg daily and Remdesivir. Also on full dose lovenox for hypercoagulable state. Diuresed intermittently. He slowly improved and de-escalated to vapotherm NC. On 7/26, patient had melena and acute drop in Hgb from 16 to 12 in 24 hours. BP stable and respirations were not affected. Placed on pantoprazole IV 40 mg BID and held  "they will forward the refill request to us. Please allow 3 business days for your refill to be completed.          Additional Information About Your Visit        MyChart Information     [employer] lets you send messages to your doctor, view your test results, renew your prescriptions, schedule appointments and more. To sign up, go to [email or web address] . Click on \"Log in\" on the left side of the screen, which will take you to the Welcome page. Then click on \"Sign up Now\" on the right side of the page.     You will be asked to enter the access code listed below, as well as some personal information. Please follow the directions to create your username and password.     Your access code is: [email or web address]  Expires: 2018  4:48 PM     Your access code will  in 90 days. If you need help or a new code, please call your [facility] clinic or [phone].        Care EveryWhere ID     This is your Care EveryWhere ID. This could be used by other organizations to access your [facility] medical records  [id number]         Blood Pressure from Last 3 Encounters:   09/15/18 117/64   18 106/60   18 126/64    Weight from Last 3 Encounters:   09/15/18 130.6 kg (288 lb)   18 132 kg (291 lb)   18 132 kg (291 lb 0.1 oz)               Primary Care Provider Office Phone # Fax #    Lisette JOE Portillo [facility] [phone]       [facility] [address] [address]        Equal Access to Services     [facility] CenterBRINDA : Hadii eddie ku hadasho Sojohn, waaxda luqadaha, qaybta kaalmada hi, james murphy. So [facility] [phone].    ATENCIÓN: Si habla español, tiene a kiran disposición servicios gratuitos de asistencia lingüística. Llame al [phone].    We comply with applicable federal civil rights laws and [place] laws. We do not discriminate on the basis of race, color, national origin, age, disability, sex, sexual orientation, or " ASA/plavix which he uses for PVD. Repeat Hgb levels remained stable and amount of melena decreased. Given such high risk for thromboembolism, he continued on enoxaparin. Noted increased melena and lovenox stopped, and changed IV PPI to Q12 and increased H/H surveillance and GI consulted.  Transfused 2 units of packed red blood cells and PPI changed to infusion.  No further bleeding and H/H overall stable. Empiric Zosyn and vancomycin initiated for continued worsening leukocytosis.  Blood cultures no growth to date.  Sputum culture NGTD. Intermittent high fever and Vancomycin levels were subtherapeutic - Zyvox added and Vancomycin stopped. Zyvox stopped on . Mental status worsening. On continuous BiPAP and not responsive. Morphine gtt started for agonal breathing. Palliative care following. Inpatient hospice is not able to take him on BiPAP. Plan for daughter and wife to come to hospital tomorrow and transition to full comfort measures then. Family present. Comfort medications continued and BiPAP removed. Patient . Time of death 11:50AM on 2020. Cause of death: acute hypoxic respiratory failure due to COVID-19.      Consults:   Consults (From admission, onward)        Status Ordering Provider     Inpatient consult to Gastroenterology  Once     Provider:  Louis Ceballos MD    Completed ALEJANDRA RAJAN     Inpatient consult to Palliative Care  Once     Provider:  Carie Macias NP    Completed STACEY SEO     Inpatient consult to PICC team ([facility])  Once     Provider:  (Not yet assigned)    Completed ALEJANDRA RAJAN     Inpatient consult to PICC team ([facility])  Once     Provider:  (Not yet assigned)    Completed AJ MENSAH     Inpatient consult to Pulmonology  Once     Provider:  Bradley Law MD    Completed WILL EDWARDS     Inpatient consult to Social Work  Once     Provider:  (Not yet assigned)    Completed YASMINE CUTLER     Inpatient consult to Spiritual Care  Once     Provider:   (Not yet assigned)    Completed STACEY SEO          No new Assessment & Plan notes have been filed under this hospital service since the last note was generated.  Service: Hospital Medicine    Final Active Diagnoses:    Diagnosis Date Noted POA    PRINCIPAL PROBLEM:  Acute hypoxemic respiratory failure [J96.01] 2020 Yes    Comfort measures only status [Z51.5] 2020 Not Applicable    Palliative care encounter [Z51.5]  Not Applicable    Shock [R57.9] 2020 No    Anemia due to acute blood loss [D62] 2020 No    GIB (gastrointestinal bleeding) [K92.2] 2020 No    COVID-19 [U07.1] 2020 Yes    Multifocal pneumonia [J18.9] 2020 Yes    Lower respiratory tract infection due to COVID-19 virus [U07.1, J22] 2020 Yes    Essential hypertension [I10] 2020 Yes    Elevated troponin I level [R79.89] 2020 Yes    PAD (peripheral artery disease) [I73.9] 2020 Yes    Dyslipidemia [E78.5] 2014 Yes      Problems Resolved During this Admission:       Discharged Condition:     Disposition: patient     Follow Up: patient     Patient Instructions:      Notify your health care provider if you experience any of the following:   Order Comments: Notify hospice of any discomfort     Activity as tolerated       Significant Diagnostic Studies: Labs: All labs within the past 24 hours have been reviewed    Pending Diagnostic Studies:     None         Medications:  None (patient  at medical facility)    Indwelling Lines/Drains at time of discharge:   Lines/Drains/Airways     Peripherally Inserted Central Catheter Line            PICC Triple Lumen 200 right brachial 7 days          Drain                 Urethral Catheter 20 1800 Latex 16 Fr. 9 days                Time spent on the discharge of patient: 35 minutes  Patient was seen and examined on the date of discharge and determined to be suitable for discharge.      Stacey  gender identity.            Thank you!     Thank you for choosing [facility]  for your care. Our goal is always to provide you with excellent care. Hearing back from our patients is one way we can continue to improve our services. Please take a few minutes to complete the written survey that you may receive in the mail after your visit with us. Thank you!             Your Updated Medication List - Protect others around you: Learn how to safely use, store and throw away your medicines at www.disposemymeds.org.          This list is accurate as of 11/5/18  7:53 PM.  Always use your most recent med list.                   Brand Name Dispense Instructions for use Diagnosis    [employer] PRENATAL 28-0.8 MG Tabs      Take 1 tablet by mouth daily        PRENATAL 1 PO           RANITIDINE HCL PO           * VITAMIN D (CHOLECALCIFEROL) PO      Take by mouth daily        * vitamin D3 2000 units Caps      Take 2,000 Units by mouth daily        * Notice:  This list has 2 medication(s) that are the same as other medications prescribed for you. Read the directions carefully, and ask your doctor or other care provider to review them with you.       Miles Castellanos MD  Department of Hospital Medicine  [facility]

## 2021-01-22 ENCOUNTER — VIRTUAL VISIT (OUTPATIENT)
Dept: PHARMACY | Facility: PHYSICIAN GROUP | Age: 26
End: 2021-01-22
Payer: COMMERCIAL

## 2021-01-22 VITALS
BODY MASS INDEX: 45.71 KG/M2 | SYSTOLIC BLOOD PRESSURE: 112 MMHG | HEART RATE: 72 BPM | HEIGHT: 63 IN | WEIGHT: 258 LBS | DIASTOLIC BLOOD PRESSURE: 74 MMHG

## 2021-01-22 DIAGNOSIS — R51.9 NONINTRACTABLE EPISODIC HEADACHE, UNSPECIFIED HEADACHE TYPE: ICD-10-CM

## 2021-01-22 DIAGNOSIS — E66.813 CLASS 3 SEVERE OBESITY WITHOUT SERIOUS COMORBIDITY WITH BODY MASS INDEX (BMI) OF 45.0 TO 49.9 IN ADULT, UNSPECIFIED OBESITY TYPE (H): ICD-10-CM

## 2021-01-22 DIAGNOSIS — E66.01 CLASS 3 SEVERE OBESITY WITHOUT SERIOUS COMORBIDITY WITH BODY MASS INDEX (BMI) OF 45.0 TO 49.9 IN ADULT, UNSPECIFIED OBESITY TYPE (H): ICD-10-CM

## 2021-01-22 DIAGNOSIS — F98.8 ATTENTION DEFICIT DISORDER, UNSPECIFIED HYPERACTIVITY PRESENCE: ICD-10-CM

## 2021-01-22 DIAGNOSIS — F41.8 SITUATIONAL ANXIETY: ICD-10-CM

## 2021-01-22 DIAGNOSIS — J45.20 MILD INTERMITTENT ASTHMA WITHOUT COMPLICATION: ICD-10-CM

## 2021-01-22 DIAGNOSIS — E66.9 OBESITY: Primary | ICD-10-CM

## 2021-01-22 PROCEDURE — 99605 MTMS BY PHARM NP 15 MIN: CPT | Mod: TEL | Performed by: PHARMACIST

## 2021-01-22 PROCEDURE — 99607 MTMS BY PHARM ADDL 15 MIN: CPT | Mod: TEL | Performed by: PHARMACIST

## 2021-01-22 RX ORDER — DEXTROAMPHETAMINE SACCHARATE, AMPHETAMINE ASPARTATE MONOHYDRATE, DEXTROAMPHETAMINE SULFATE AND AMPHETAMINE SULFATE 5; 5; 5; 5 MG/1; MG/1; MG/1; MG/1
20 CAPSULE, EXTENDED RELEASE ORAL DAILY
COMMUNITY
End: 2022-03-11

## 2021-01-22 RX ORDER — ALBUTEROL SULFATE 90 UG/1
2 AEROSOL, METERED RESPIRATORY (INHALATION) EVERY 6 HOURS PRN
COMMUNITY

## 2021-01-22 RX ORDER — ALPRAZOLAM 0.5 MG
0.5 TABLET ORAL PRN
COMMUNITY
End: 2022-05-19

## 2021-01-22 RX ORDER — LIRAGLUTIDE 6 MG/ML
1.8 INJECTION SUBCUTANEOUS DAILY
COMMUNITY
End: 2021-05-27 | Stop reason: ALTCHOICE

## 2021-01-22 ASSESSMENT — MIFFLIN-ST. JEOR: SCORE: 1871.47

## 2021-01-22 NOTE — PROGRESS NOTES
Medication Therapy Management (MTM) Encounter    ASSESSMENT:                            Medication Adherence/Access: See below for considerations      Obesity: Reviewed options, pros/cons and using shared decision making will start daily Victoza. The only weekly GLP1 agonist on her formulary is Bydureon and this is one dose and slightly more challenging of a device to use, so decided daily Victoza may be better given more dose flexibility, slightly greater efficacy per trial data and on formulary. We did discuss other agents that may be something to consider in the future including bupropion as this could help some with ADD symptoms, but also the reward center processing related to her eating. Would need to monitor anxiety if this was used as it can sometimes worsen anxiety. The other option to consider would be topiramate give her headaches.     ADD: recommend starting this medication after getting 1-2 weeks of Victoza started.     Asthma: Stable.    Headaches: Focusing on lifestyle management at this time, but may consider topiramate if needed in the future- would need to discuss concern in pregnancy if this agent was used.      Anxiety: Stable.    PLAN:                            1. Start Victoza 0.6mg daily x 7 days then increase to 1.2mg daily    2. Start food log for the evening to help determine efficacy.     3. Wait wait to start Adderall until after a few days on the 1.2mg Victoza dose.     4. Future consideration would be to trial bupropion.     Follow-up: 3 weeks    SUBJECTIVE/OBJECTIVE:                          Katia Rizvi is a 25 year old female called for an initial visit. She was referred to me from Dr. Peters.    Reason for visit: Discuss weight loss medication.    Allergies/ADRs: Reviewed in chart  Tobacco: She reports that she has never smoked. She has never used smokeless tobacco.  Alcohol: not currently using  Caffeine: 4-5 cups/day of coffee  Activity: limited  Past Medical History:  "Reviewed in chart    Medication Adherence/Access: doesn't typically take daily medication so is thinking weekly medication may be easier for her to take.     Obesity: BMI= 46.5, weight 258lbs.   has not tried any medication options yet.   Weight has always been an issue, but starting more dieting about 5 years ago. Has done WW, meal preps, shakes.   Currently following a Keto diet right now (has been doing this for about a year), feeling stuck right now in regards to any additional weight loss. Down 10lbs from Dec (268lbs)  Always feeling hungry.   The evening hours are the hardest for her where only focus is food.   Not waking at night to eat.   Drinking water or coffee.   Limited exercise at this time, has done a little yoga, but not much. Would be interested in the gym in the future.      ADD: Starting on Adderall XR 20mg daily - has not started this just yet as she is hoping to start something for weight loss first then start this. Psych testing in 2018.     Asthma: Current medications: Short-Acting Bronchodilator: Albuterol MDI and Nebs.  Rarely using these, but has if needed. Patient reports the following symptoms: none.    Headaches: not on any medication at this time but typically gets headaches and some migraines around her cycle each month. Does drink a lot of caffeine in efforts to keep energy up which may also be contributing when the caffeine use varies.     Anxiety: Situational anxiety around flying and driving (riding with others). Has Xanax as needed, works well for her and denies issues. No other concerns with anxiety at this time.     Today's Vitals: /74   Pulse 72   Ht 5' 2.5\" (1.588 m)   Wt 258 lb (117 kg)   BMI 46.44 kg/m    ----------------    I spent 45 minutes with this patient today. All changes were made via collaborative practice agreement with Dr. Peters. A copy of the visit note was provided to the patient's primary care provider.    The patient declined a summary of these " recommendations.     Keri Arenas, Pharm.D, [facility]  Medication Therapy Management Pharmacist  [phone]      Telemedicine Visit Details  Type of service:  Telephone visit  Start Time: 12:31 PM  End Time: 1:16 PM  Originating Location (patient location): Home  Distant Location (provider location):  [facility]      Medication Therapy Recommendations  Obesity    Current Medication: liraglutide (VICTOZA) 18 MG/3ML solution   Rationale: Untreated condition - Needs additional medication therapy - Indication   Recommendation: Start Medication - VICTOZA PEN 18 MG/3ML soln - start medication   Status: Accepted per CPA

## 2021-02-12 ENCOUNTER — VIRTUAL VISIT (OUTPATIENT)
Dept: PHARMACY | Facility: PHYSICIAN GROUP | Age: 26
End: 2021-02-12
Payer: COMMERCIAL

## 2021-02-12 DIAGNOSIS — F98.8 ATTENTION DEFICIT DISORDER, UNSPECIFIED HYPERACTIVITY PRESENCE: ICD-10-CM

## 2021-02-12 DIAGNOSIS — E66.813 CLASS 3 SEVERE OBESITY WITHOUT SERIOUS COMORBIDITY WITH BODY MASS INDEX (BMI) OF 45.0 TO 49.9 IN ADULT, UNSPECIFIED OBESITY TYPE (H): Primary | ICD-10-CM

## 2021-02-12 DIAGNOSIS — E66.01 CLASS 3 SEVERE OBESITY WITHOUT SERIOUS COMORBIDITY WITH BODY MASS INDEX (BMI) OF 45.0 TO 49.9 IN ADULT, UNSPECIFIED OBESITY TYPE (H): Primary | ICD-10-CM

## 2021-02-12 PROCEDURE — 99606 MTMS BY PHARM EST 15 MIN: CPT | Mod: TEL | Performed by: PHARMACIST

## 2021-02-12 PROCEDURE — 99607 MTMS BY PHARM ADDL 15 MIN: CPT | Mod: TEL | Performed by: PHARMACIST

## 2021-02-12 NOTE — PROGRESS NOTES
Medication Therapy Management (MTM) Encounter    ASSESSMENT:                            Medication Adherence/Access: No issues identified    obesity: Increase victoza to 1.2mg daily- reviewed strategies for dosing and goal doses for weight loss.     ADD: Stable.    PLAN:                            1. Increase Victoza to 1.2mg daily.     Follow-up: 4 weeks    SUBJECTIVE/OBJECTIVE:                          Katia Rizvi is a 26 year old female called for a follow-up visit. She was referred to me from Dr. Peters.  Today's visit is a follow-up MTM visit from 1/22     Reason for visit: med follow up.    Allergies/ADRs: Reviewed in chart  Tobacco: She reports that she has never smoked. She has never used smokeless tobacco.  Alcohol: not currently using  Caffeine: 4-5 cups/day of coffee  Activity: limited  Past Medical History: Reviewed in chart    Medication Adherence/Access: doesn't typically take daily medication so is thinking weekly medication may be easier for her to take.     Obesity: Victoza 0.6mg for now, has been staying at this dose since starting, wasn't sure if she should increase or not. Felt appetite went down on week one, but went back to normal the next week. Tolerating well without side effects.   BMI= 46.5, weight 258lbs.   Weight has always been an issue, but starting more dieting about 5 years ago. Has done WW, meal preps, shakes.   Currently following a Keto diet right now (has been doing this for about a year),Always feeling hungry.   The evening hours are the hardest for her where only focus is food.   Not waking at night to eat.   Drinking water or coffee.   Limited exercise at this time, has done a little yoga, but not much. Would be interested in the gym in the future.      ADD: Starting on Adderall XR 20mg daily, tolerating this and does report that her focus is better on the medication.  Psych testing in 2018. Denies side effects or concerns with the medication at this time.      Today's  Vitals: There were no vitals taken for this visit.  ----------------      I spent 10 minutes with this patient today. All changes were made via collaborative practice agreement with Dr. Peters. A copy of the visit note was provided to the patient's primary care provider.    The patient declined a summary of these recommendations.      Keri Arenas, Pharm.D, [facility]  Medication Therapy Management Pharmacist  [phone]      Telemedicine Visit Details  Type of service:  Telephone visit  Start Time: 1:03 PM  End Time: 1:13 PM  Originating Location (patient location): [place]  Distant Location (provider location):  [facility]      Medication Therapy Recommendations  Obesity    Current Medication: liraglutide (VICTOZA) 18 MG/3ML solution   Rationale: Does not understand instructions - Adherence - Adherence   Recommendation: Provide Education - VICTOZA PEN 18 MG/3ML soln - increase to 1.2mg daily   Status: Patient Agreed - Adherence/Education

## 2021-03-10 ENCOUNTER — VIRTUAL VISIT (OUTPATIENT)
Dept: PHARMACY | Facility: PHYSICIAN GROUP | Age: 26
End: 2021-03-10
Payer: COMMERCIAL

## 2021-03-10 DIAGNOSIS — Z30.8 ENCOUNTER FOR OTHER CONTRACEPTIVE MANAGEMENT: ICD-10-CM

## 2021-03-10 DIAGNOSIS — F98.8 ATTENTION DEFICIT DISORDER, UNSPECIFIED HYPERACTIVITY PRESENCE: ICD-10-CM

## 2021-03-10 DIAGNOSIS — E66.01 CLASS 3 SEVERE OBESITY WITHOUT SERIOUS COMORBIDITY WITH BODY MASS INDEX (BMI) OF 45.0 TO 49.9 IN ADULT, UNSPECIFIED OBESITY TYPE (H): Primary | ICD-10-CM

## 2021-03-10 DIAGNOSIS — E66.813 CLASS 3 SEVERE OBESITY WITHOUT SERIOUS COMORBIDITY WITH BODY MASS INDEX (BMI) OF 45.0 TO 49.9 IN ADULT, UNSPECIFIED OBESITY TYPE (H): Primary | ICD-10-CM

## 2021-03-10 PROCEDURE — 99606 MTMS BY PHARM EST 15 MIN: CPT | Mod: TEL | Performed by: PHARMACIST

## 2021-03-10 PROCEDURE — 99607 MTMS BY PHARM ADDL 15 MIN: CPT | Mod: TEL | Performed by: PHARMACIST

## 2021-03-10 RX ORDER — DEXTROAMPHETAMINE SACCHARATE, AMPHETAMINE ASPARTATE, DEXTROAMPHETAMINE SULFATE AND AMPHETAMINE SULFATE 2.5; 2.5; 2.5; 2.5 MG/1; MG/1; MG/1; MG/1
10 TABLET ORAL DAILY
COMMUNITY
End: 2022-03-11

## 2021-03-10 NOTE — PROGRESS NOTES
Medication Therapy Management (MTM) Encounter    ASSESSMENT:                            Medication Adherence/Access: No issues identified    Obesity: suggest increase in GLP1 dose and discussion around alternative options for weight management including behavioral therapy or health coaching.     ADD: continue with new regimen.     Contraception:  Reviewed options for contraception and the risks of weight gain across the different medications, will trial loestrin monitoring weight.    PLAN:                            1. Trial of Saxenda for 2.4mg dose- UPDATE: was not covered and PA was denied, so plan to continue with Victoza 1.8mg daily.     2. Referral placed for medical weight management with [facility] for a comprehensive approach and evaluation of services.     Follow-up: 1 month or sooner if needed.     SUBJECTIVE/OBJECTIVE:                          Katia Rizvi is a 26 year old female called for a follow-up visit. She was referred to me from Dr. Peters.  Today's visit is a follow-up MTM visit from 2/12     Reason for visit: medication follow up.    Allergies/ADRs: Reviewed in chart  Tobacco: She reports that she has never smoked. She has never used smokeless tobacco.  Alcohol: not currently using  Caffeine: 4-5 cups/day of coffee  Activity: limited  Past Medical History: Reviewed in chart    Medication Adherence/Access: doesn't typically take daily medication so is thinking weekly medication may be easier for her to take.     Obesity: Victoza 1.8mg increased last visit, going okay but not sure if the effect is sticking with her after the initial week of a dose increase. Reports the full feeling working really well for the first week after a dose increase but then it 'wears off'. No side effects. Has seen 30lb loss since December, last check was 248lbs.   Going to the gym twice a week or outside if she can.  Weight has always been an issue, but starting more dieting about 5 years ago. Has done WW,  meal preps, shakes.   Currently following a Keto diet right now (has been doing this for about a year), Always feeling hungry.   The evening hours are the hardest for her where only focus is food, feels her childhood and thought patterns are a big part of the eating behaviors, would be willing to work with therapist or other supports in weight loss efforts. Is not interested in surgical interventions.  Not waking at night to eat.   Drinking water or coffee.     ADD: Starting on Adderall XR 20mg daily and now second dose mid day just added by PCP. tolerating this and does report that her focus is better on the medication.  Psych testing in 2018. Denies side effects or concerns with the medication at this time.    Contraception: Just prescribed Loestrin by PCP for OCP, but she is nervous about the weight gain potential of the birth control.       Today's Vitals: There were no vitals taken for this visit.  ----------------    I spent  18 minutes with this patient today. All changes were made via collaborative practice agreement with Dr. Peters. A copy of the visit note was provided to the patient's primary care provider.    The patient was sent via clinic portal a summary of these recommendations.     Keri Arenas, Pharm.D, [facility]  Medication Therapy Management Pharmacist  [phone]      Telemedicine Visit Details  Type of service:  Telephone visit  Start Time: 1:52 PM  End Time: 2:10 PM  Originating Location (patient location): [place]  Distant Location (provider location):  [facility]      Medication Therapy Recommendations  Obesity    Current Medication: liraglutide (VICTOZA) 18 MG/3ML solution   Rationale: Dose too low - Dosage too low - Effectiveness   Recommendation: Increase Dose - Saxenda 18 MG/3ML Sopn - 2.4 mg daily   Status: Accepted per CPA          Current Medication: liraglutide (VICTOZA) 18 MG/3ML solution   Rationale: Synergistic therapy - Needs additional medication therapy  - Indication   Recommendation: Referral to Service  - VICTOZA PEN 18 MG/3ML soln - Referral to medical weight management   Status: Accepted per CPA

## 2021-03-12 RX ORDER — NORETHINDRONE ACETATE AND ETHINYL ESTRADIOL .03; 1.5 MG/1; MG/1
1 TABLET ORAL DAILY
COMMUNITY
End: 2021-07-28 | Stop reason: ALTCHOICE
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
## 2021-04-08 ENCOUNTER — VIRTUAL VISIT (OUTPATIENT)
Dept: SURGERY | Facility: CLINIC | Age: 26
End: 2021-04-08
Payer: COMMERCIAL

## 2021-04-08 VITALS — HEIGHT: 62 IN | WEIGHT: 235 LBS | BODY MASS INDEX: 43.24 KG/M2

## 2021-04-08 DIAGNOSIS — Z13.21 SCREENING FOR ENDOCRINE, NUTRITIONAL, METABOLIC AND IMMUNITY DISORDER: ICD-10-CM

## 2021-04-08 DIAGNOSIS — E66.01 CLASS 3 SEVERE OBESITY DUE TO EXCESS CALORIES WITH SERIOUS COMORBIDITY AND BODY MASS INDEX (BMI) OF 40.0 TO 44.9 IN ADULT (H): Primary | ICD-10-CM

## 2021-04-08 DIAGNOSIS — E66.813 CLASS 3 SEVERE OBESITY DUE TO EXCESS CALORIES WITH SERIOUS COMORBIDITY AND BODY MASS INDEX (BMI) OF 40.0 TO 44.9 IN ADULT (H): Primary | ICD-10-CM

## 2021-04-08 DIAGNOSIS — Z13.0 SCREENING FOR ENDOCRINE, NUTRITIONAL, METABOLIC AND IMMUNITY DISORDER: ICD-10-CM

## 2021-04-08 DIAGNOSIS — Z13.29 SCREENING FOR ENDOCRINE, NUTRITIONAL, METABOLIC AND IMMUNITY DISORDER: ICD-10-CM

## 2021-04-08 DIAGNOSIS — Z13.228 SCREENING FOR ENDOCRINE, NUTRITIONAL, METABOLIC AND IMMUNITY DISORDER: ICD-10-CM

## 2021-04-08 PROCEDURE — 99215 OFFICE O/P EST HI 40 MIN: CPT | Mod: 95 | Performed by: PHYSICIAN ASSISTANT

## 2021-04-08 ASSESSMENT — MIFFLIN-ST. JEOR: SCORE: 1754.2

## 2021-04-08 NOTE — PATIENT INSTRUCTIONS
It was good talking to you today.  Please call and schedule to check back in with me in 4-5 weeks after meeting with the dietitian.     Goals:  Eat Breakfast and have 3 meals daily  Get labs drawn  Start therapy - referral placed                                Protien Links:  Protein Sources for Weight Loss  [email or web address]

## 2021-04-12 NOTE — PROGRESS NOTES
"Video-Visit Details    Type of service:  Video Visit    Video Start Time (time video started): 3:03    Video End Time (time video stopped): 3:48    Originating Location (pt. Location): Home    Distant Location (provider location):  [facility] [place]     Mode of Communication:  Video Conference via [employer]          MEDICAL WEIGHT LOSS INITIAL EVALUATION  DIAGNOSIS:  Class III Obesity    NUTRITION HISTORY:    Diet Recall Review with Patient 4/13/2021-updated   Do you typically eat breakfast? Skips 3X per week or eggs with ham, spinach, black coffee + heavy whipping cream   Do you typically eat lunch? Skips 2X per week- soup or basil fried rice or Liberian food   Do you typically eat supper? Yes   If you do eat supper, what types of food do you typically eat? Meat and keto snacks (coconut clusters made with coconut) or chips and salsa or cheese   Do you typically eat snacks? Yes @ 8-10 pm   If you do snack, what types of food do you typically eat? Keto snacks or tortilla chips and salsa or beef jerky, left over taco meat,       Beverage choices: 64 oz water, coffee + heavy whipping cream, ETOH-2 glasses of wine or martini per month  Dining out: Door dash or other take out 2-3X per week-Liberian or Eddi   Binge eating: no  Emotional eating: yes, due to boredom  Night time eating: no  Exercise: Gym (elliptical) or yoga weights at home 3X per week-20-30 min.  Other: Patient shared that both of her parents are \"big.\"  Her mom had gastric bypass surgery ~ 17 years ago and has gained most of her weight back.She was active in sports in High School.  Has been following a \"dirty keto diet.\" She is open to having weight loss surgery if she can't sustain her weight loss. Lives with her s/o, his sister and her 2.5 year old daughter. She does most of the cooking for all of them. She has lost ~ 58 pounds over the past 5 month. Works from home most days. Patient requested a calorie level to help promote " "weight loss. Advised 7066-1428 calories/ day (11-14 kcal/kg ABS), but did not set a goal to track intake.    Medication for Weight Loss:  Vitoza (prescribed by another provider ~ 6 months ago)    ANTHROPOMETRICS:  Height: 62\"  Weight: 235 lb on 4/8/21 per pt   BMI:  42.98 kg/m2  NUTRITION DIAGNOSIS:   Obese, class III related to excess energy intake as evidence by BMI of  43.35  NUTRITION INTERVENTIONS  Nutrition Prescription:  Recommend modified energy- nutrient intake  Implementation:  Nutrition Education (Content):    Discussed portion sizes     Determined alternative to emotional eating    Reviewed healthy carbohydrates and the cons of the Keto diet    Provided  My plate guidelines    Nutrition Education (Application):     Patient to practice goals as stated below    Patient verbalizes understanding of diet by wanting to try eating some good carbohydrates    Anticipate fair-good compliance    Goals:  Focus on eating 3 meals per day at regular times  Practice alternatives to boredom eating (yoga, adult coloring, biking, kick boxing)  To focus on eating slower try eating with smaller utensils  Replace whipping cream in coffee with whole milk     FOLLOW UP AND MONITORING:    Other  - follow up in 8 weeks.     TIME SPENT WITH PATIENT:   43 minutes   Chi Chi RD, LD  [facility], [place]  "

## 2021-04-13 ENCOUNTER — VIRTUAL VISIT (OUTPATIENT)
Dept: SURGERY | Facility: CLINIC | Age: 26
End: 2021-04-13
Payer: COMMERCIAL

## 2021-04-13 VITALS — BODY MASS INDEX: 43.35 KG/M2 | WEIGHT: 237 LBS

## 2021-04-13 NOTE — PATIENT INSTRUCTIONS
Ezio Montalvo-  Menacome to the [facility], [place]! It was great to visit with you and learn about your interest in weight loss. Below are the goals we discussed.  Goals:  Focus on eating 3 meals per day at regular times  Practice alternatives to boredom eating (yoga, adult coloring, biking, kick boxing)  To focus on eating slower try eating with smaller utensils  Replace whipping cream in coffee with whole milk    Nutrition Educational Materials:    My Plate Planner_English - Pt Education  [email or web address]    Carbohydrates  [email or web address]     Mindful Eating  [email or web address]     Please call [phone] to schedule your next virtual visit with a Dietitian in 2 months.  Thanks!  Chi Chi RD, LD  [facility], [place]

## 2021-04-17 ENCOUNTER — HEALTH MAINTENANCE LETTER (OUTPATIENT)
Age: 26
End: 2021-04-17

## 2021-04-30 ENCOUNTER — VIRTUAL VISIT (OUTPATIENT)
Dept: PSYCHOLOGY | Facility: CLINIC | Age: 26
End: 2021-04-30
Attending: PHYSICIAN ASSISTANT
Payer: COMMERCIAL

## 2021-04-30 DIAGNOSIS — F41.9 ANXIETY DISORDER, UNSPECIFIED TYPE: Primary | ICD-10-CM

## 2021-04-30 PROCEDURE — 90834 PSYTX W PT 45 MINUTES: CPT | Mod: 95 | Performed by: SOCIAL WORKER

## 2021-04-30 ASSESSMENT — ANXIETY QUESTIONNAIRES
7. FEELING AFRAID AS IF SOMETHING AWFUL MIGHT HAPPEN: NOT AT ALL
GAD7 TOTAL SCORE: 8
1. FEELING NERVOUS, ANXIOUS, OR ON EDGE: MORE THAN HALF THE DAYS
6. BECOMING EASILY ANNOYED OR IRRITABLE: MORE THAN HALF THE DAYS
GAD7 TOTAL SCORE: 8
5. BEING SO RESTLESS THAT IT IS HARD TO SIT STILL: NOT AT ALL
4. TROUBLE RELAXING: NOT AT ALL
3. WORRYING TOO MUCH ABOUT DIFFERENT THINGS: MORE THAN HALF THE DAYS
GAD7 TOTAL SCORE: 8
7. FEELING AFRAID AS IF SOMETHING AWFUL MIGHT HAPPEN: NOT AT ALL
2. NOT BEING ABLE TO STOP OR CONTROL WORRYING: MORE THAN HALF THE DAYS

## 2021-04-30 ASSESSMENT — COLUMBIA-SUICIDE SEVERITY RATING SCALE - C-SSRS
5. HAVE YOU STARTED TO WORK OUT OR WORKED OUT THE DETAILS OF HOW TO KILL YOURSELF? DO YOU INTEND TO CARRY OUT THIS PLAN?: NO
3. HAVE YOU BEEN THINKING ABOUT HOW YOU MIGHT KILL YOURSELF?: NO
1. IN THE PAST MONTH, HAVE YOU WISHED YOU WERE DEAD OR WISHED YOU COULD GO TO SLEEP AND NOT WAKE UP?: YES
5. HAVE YOU STARTED TO WORK OUT OR WORKED OUT THE DETAILS OF HOW TO KILL YOURSELF? DO YOU INTEND TO CARRY OUT THIS PLAN?: NO
2. HAVE YOU ACTUALLY HAD ANY THOUGHTS OF KILLING YOURSELF LIFETIME?: NO
1. IN THE PAST MONTH, HAVE YOU WISHED YOU WERE DEAD OR WISHED YOU COULD GO TO SLEEP AND NOT WAKE UP?: NO
ATTEMPT PAST THREE MONTHS: NO
2. HAVE YOU ACTUALLY HAD ANY THOUGHTS OF KILLING YOURSELF?: NO
ATTEMPT LIFETIME: NO
4. HAVE YOU HAD THESE THOUGHTS AND HAD SOME INTENTION OF ACTING ON THEM?: NO
4. HAVE YOU HAD THESE THOUGHTS AND HAD SOME INTENTION OF ACTING ON THEM?: NO

## 2021-04-30 ASSESSMENT — PATIENT HEALTH QUESTIONNAIRE - PHQ9
SUM OF ALL RESPONSES TO PHQ QUESTIONS 1-9: 6
SUM OF ALL RESPONSES TO PHQ QUESTIONS 1-9: 6
10. IF YOU CHECKED OFF ANY PROBLEMS, HOW DIFFICULT HAVE THESE PROBLEMS MADE IT FOR YOU TO DO YOUR WORK, TAKE CARE OF THINGS AT HOME, OR GET ALONG WITH OTHER PEOPLE: SOMEWHAT DIFFICULT

## 2021-04-30 NOTE — PROGRESS NOTES
Progress Note - Initial Visit    Client Name:  Katia Rizvi Date: 2021         Service Type: Individual     Visit Start Time:  8:34 am   Visit End Time:  9:25 am    Visit #: 1    Attendees: Client attended alone    Service Modality:  Video Visit:      Provider verified identity through the following two step process.  Patient provided:  Patient  and Patient address    Telemedicine Visit: The patient's condition can be safely assessed and treated via synchronous audio and visual telemedicine encounter.      Reason for Telemedicine Visit: Services only offered telehealth    Originating Site (Patient Location): Patient's home    Distant Site (Provider Location): Provider Remote Setting    Consent:  The patient/guardian has verbally consented to: the potential risks and benefits of telemedicine (video visit) versus in person care; bill my insurance or make self-payment for services provided; and responsibility for payment of non-covered services.     Patient would like the video invitation sent by:  My Chart    Mode of Communication:  Video Conference via Amwell    As the provider I attest to compliance with applicable laws and regulations related to telemedicine.       DATA:   Interactive Complexity: No   Crisis: No     Presenting Concerns/  Current Stressors:   Patient reports multiple stressors at this time.  Patient reports stress in relationship with her boyfriend.  Patient reports she has struggled with being overweight and would like to lose weight.  Patient reports a history of Cannabis use, reports she used it daily at this time.  Patient reports a history of ADHD symptoms.        ASSESSMENT:  Mental Status Assessment:  Appearance:   Appropriate   Eye Contact:   Good   Psychomotor Behavior: Normal   Attitude:   Cooperative   Orientation:   All  Speech   Rate / Production: Normal/ Responsive   Volume:  Normal   Mood:    Anxious  Depressed  Irritable   Affect:    Appropriate   Thought  Content:  Clear  Rumination   Thought Form:  Coherent  Logical   Insight:    Good  and Fair       Safety Issues and Plan for Safety and Risk Management:     [place] Suicide Severity Rating Scale (Lifetime/Recent)  [place] Suicide Severity Rating (Lifetime/Recent) 4/30/2021   1. Wish to be Dead (Lifetime) Yes   Wish to be Dead Description (Lifetime) some passing after a breakup about 6 years ago   1. Wish to be Dead (Recent) No   2. Non-Specific Active Suicidal Thoughts (Lifetime) No   2. Non-Specific Active Suicidal Thoughts (Recent) No   3. Active Suicidal Ideation with any Methods (Not Plan) Without Intent to Act (Lifetime) No   3. Active Suicidal Ideation with any Methods (Not Plan) Without Intent to Act (Recent) No   4. Active Suicidal Ideation with Some Intent to Act, Without Specific Plan (Lifetime) No   4. Active Suicidal Ideation with Some Intent to Act, Without Specific Plan (Recent) No   5. Active Suicidal Ideation with Specific Plan and Intent (Lifetime) No   5. Active Suicidal Ideation with Specific Plan and Intent (Recent) No   Actual Attempt (Lifetime) No   Actual Attempt (Past 3 Months) No   Has subject engaged in non-suicidal self-injurious behavior? (Lifetime) No   Has subject engaged in non-suicidal self-injurious behavior? (Past 3 Months) No     Patient denies current fears or concerns for personal safety.  Patient denies current or recent suicidal ideation or behaviors.  Patient denies current or recent homicidal ideation or behaviors.  Patient denies current or recent self injurious behavior or ideation.  Patient denies other safety concerns.  Recommended that patient call 911 or go to the local ED should there be a change in any of these risk factors.  Patient reports there are firearms in the house. The firearms are secured in a locked space.     Diagnostic Criteria:  Mixed anxiety-depressive disorder: clinically significant symptoms of anxiety and depression, but the criteria are not met  for either a specific Mood Disorder or a specific Anxiety Disorder.  The client does not report enough symptoms for the full criteria of any specific Anxiety Disorder to have been met  Anxiety disorder is present, but at this time therapist is unable to determine whether it is primary.  Further assessment needed.  Client reports the following symptoms of anxiety:   - Excessive anxiety and worry about a number of events or activities (such as work or school performance).    - The person finds it difficult to control the worry.   - Restlessness or feeling keyed up or on edge.    - Being easily fatigued.    - Difficulty concentrating or mind going blank.    - Irritability.    - Sleep disturbance (difficulty falling or staying asleep, or restless unsatisfying sleep).    - The focus of the anxiety and worry is not confined to features of an Axis I disorder.   - The anxiety, worry, or physical symptoms cause clinically significant distress or impairment in social, occupational, or other important areas of functioning.       DSM5 Diagnoses: (Sustained by DSM5 Criteria Listed Above)  Diagnoses: 300.00 (F41.9) Unspecified Anxiety Disorder  Psychosocial & Contextual Factors: Has 2.5 year old daughter.  Working full time.  Parents  when she was a young child.  Reports history of struggling for obesity.    WHODAS 2.0 (12 item): No flowsheet data found.  Intervention:   Completed through review of safety issues and safety interventions, Educated on treatment planning and started identifying goals and interventions for treatment plan and Reflective Listening, Building Rapport and Validation  Collateral Reports Completed:  Plan to send completed DA to PCP.        PLAN: (Homework, other):  1. Provider will continue Diagnostic Assessment.  Patient was given the following to do until next session:  Patient encouraged to focus on self-care.  .    2. Provider recommended the following referrals: Recommended to continue  outpatient therapy.  .          Gayle Rea, [facility]  April 30, 2021

## 2021-05-01 ASSESSMENT — PATIENT HEALTH QUESTIONNAIRE - PHQ9: SUM OF ALL RESPONSES TO PHQ QUESTIONS 1-9: 6

## 2021-05-01 ASSESSMENT — ANXIETY QUESTIONNAIRES: GAD7 TOTAL SCORE: 8

## 2021-05-14 ENCOUNTER — VIRTUAL VISIT (OUTPATIENT)
Dept: SURGERY | Facility: CLINIC | Age: 26
End: 2021-05-14
Payer: COMMERCIAL

## 2021-05-14 ENCOUNTER — VIRTUAL VISIT (OUTPATIENT)
Dept: PSYCHOLOGY | Facility: CLINIC | Age: 26
End: 2021-05-14
Payer: COMMERCIAL

## 2021-05-14 VITALS — HEIGHT: 63 IN | BODY MASS INDEX: 40.22 KG/M2 | WEIGHT: 227 LBS

## 2021-05-14 DIAGNOSIS — E66.813 CLASS 3 SEVERE OBESITY DUE TO EXCESS CALORIES WITH SERIOUS COMORBIDITY AND BODY MASS INDEX (BMI) OF 40.0 TO 44.9 IN ADULT (H): Primary | ICD-10-CM

## 2021-05-14 DIAGNOSIS — E66.01 CLASS 3 SEVERE OBESITY DUE TO EXCESS CALORIES WITH SERIOUS COMORBIDITY AND BODY MASS INDEX (BMI) OF 40.0 TO 44.9 IN ADULT (H): Primary | ICD-10-CM

## 2021-05-14 DIAGNOSIS — F41.9 ANXIETY DISORDER, UNSPECIFIED TYPE: Primary | ICD-10-CM

## 2021-05-14 DIAGNOSIS — M54.50 LEFT LOW BACK PAIN, UNSPECIFIED CHRONICITY, UNSPECIFIED WHETHER SCIATICA PRESENT: ICD-10-CM

## 2021-05-14 PROCEDURE — 90791 PSYCH DIAGNOSTIC EVALUATION: CPT | Mod: 95 | Performed by: SOCIAL WORKER

## 2021-05-14 PROCEDURE — 99214 OFFICE O/P EST MOD 30 MIN: CPT | Mod: 95 | Performed by: PHYSICIAN ASSISTANT

## 2021-05-14 ASSESSMENT — ANXIETY QUESTIONNAIRES
3. WORRYING TOO MUCH ABOUT DIFFERENT THINGS: SEVERAL DAYS
2. NOT BEING ABLE TO STOP OR CONTROL WORRYING: SEVERAL DAYS
4. TROUBLE RELAXING: MORE THAN HALF THE DAYS
6. BECOMING EASILY ANNOYED OR IRRITABLE: SEVERAL DAYS
1. FEELING NERVOUS, ANXIOUS, OR ON EDGE: SEVERAL DAYS
5. BEING SO RESTLESS THAT IT IS HARD TO SIT STILL: SEVERAL DAYS
7. FEELING AFRAID AS IF SOMETHING AWFUL MIGHT HAPPEN: SEVERAL DAYS
GAD7 TOTAL SCORE: 8

## 2021-05-14 ASSESSMENT — MIFFLIN-ST. JEOR: SCORE: 1733.8

## 2021-05-14 ASSESSMENT — PATIENT HEALTH QUESTIONNAIRE - PHQ9: SUM OF ALL RESPONSES TO PHQ QUESTIONS 1-9: 2

## 2021-05-14 NOTE — PATIENT INSTRUCTIONS
It was great seeing you today. Congrats on all of your hard work.  You can follow up with me in 1-2 months or prn.  Have a great weekend        Eat Better ? Move More ? Live Well    Eat 3 nutrient-rich meals each day     Don t skip meals--it will cause you to overeat later in the day!     Eating fiber (vegetables/fruits/whole grains) and protein with meals helps you stay full longer     Choose foods with less than 10 grams of sugar and 5 grams of fat per serving to prevent excess calories and weight re-gain   Eat around the same times each day to develop a routine eating schedule    Avoid snacking unless physically hungry.   Planned snacks: 1-2 times per day and no more than 150 calories    Eat protein first    Protein helps with healing, maintaining adequate muscle mass, reducing hunger and optimizing nutritional status    Aim for 60-80 grams of protein per day   Fill up on Fiber    Fiber comes from plants--fruits, veggies, whole grains, nuts/seeds and beans    Fiber is low in calories, high in phytonutrients and helps you stay full longer    Aim for 25-35 grams per day by eating fiber with meals and snacks  Eat [id number]    Take 20-30 minutes to eat each meal by taking small bites, chewing foods to applesauce consistency or 20-30 times before you swallow    Eating foods too fast can delay satiety/fullness signals and increase overeating   ? Slow down your eating by using toddler utensils, putting your fork/spoon down between bites and not watching TV or emailing during meals!   Keep a Journal          Writing down what you eat, how you feel and when you are active helps you identify new changes to work on from week to week          Look for ways to cut 100 calories from your current diet 2-3 times per day  Drink 64 ounces of 0-Calorie drinks between meals    Water    Zero calorie Propel  or Vitamin Water      SoBe Lifewater  Zero Calories    Crystal Light , Sugar-Free Con-Aid , and other sugar-free lemonade or  flavored hamilton    Keep Caffeine to less than 300mg per day ie: 3-6oz cups coffee     Work up to 45-60 minutes of physical activity most days of the week    Helps with losing weight and prevent regaining those extra pounds!     Do a combo of cardio (walking/water exercises) and strength training (lifting weights/Vinyasa yoga)    Avoid Mindless Eating    Be present when you eat--take note of the smell, taste and quality of your food    Make a list of alternative activities you could do to prevent eating out of boredom/stress  ? Go for a walk, call a friend, chew gum, paint your nails, re-organize the garage, etc
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
## 2021-05-14 NOTE — PROGRESS NOTES
Katia is a 26 year old who is being evaluated via a billable video visit.      If the video visit is dropped, the invitation should be resent by: Text to cell phone: [phone]  Will anyone else be joining your video visit? No      Video-Visit Details    Type of service:  Video Visit    Video Start Time: 3:06    Video End Time: 3:24    32 minutes spent on the date of the encounter doing chart review, review of test results, patient visit and documentation       Originating Location (pt. Location): Home    Distant Location (provider location):  [facility] [place]     Platform used for Video Visit: [employer]            May 14, 2021          Return Virtual Medical Weight Management Note         Katia Rizvi  MRN:  [id number]  :  1995      Dear Lisette Zepeda,    I had the pleasure of doing a virtual visit with your patient Katia Rizvi.  She is a 26 year old female who I am continuing to see for treatment of obesity related to:       2021   I have the following health issues associated with obesity: High Cholesterol   I have the following symptoms associated with obesity: Knee Pain, Depression, Back Pain, Fatigue, Groin Rash, Irregular Menstral Cycle       INTERVAL HISTORY:  Patient was seen 2021 for weight management.  It was agreed at that time that she would start working with a dietitian and work on lifestyle.  She was already on victoza and adderall that are managing her appetite.  She was also given a referral to start therapy.  Patient has increased her exercising to 3 times weekly.  Eats 3 times daily sticks to Keto diet   Started therapy and has gone twice.  She is down close to 10 lbs      Goals:  Eat Breakfast and have 3 meals daily - MET  Get labs drawn  Start therapy - referral placed - MET        CURRENT WEIGHT:   227 lbs 0 oz    Wt Readings from Last 4 Encounters:   21 227 lb (103 kg)   21 230 lb (104.3 kg)   21 237 lb  (107.5 kg)   04/08/21 235 lb (106.6 kg)       DIETARY HISTORY  Meals Per Day: 3  Food Diary:   B: Smoothie with spinach, mixed frozen fruit, skim milk and flax seed.    L: Today had leftovers. Had spring rolls with shrimp and chicken  D: Chicken tacos  Snacks Per Day:  No not really   Fluid Intake: At least half gallon of water daily.    Calorie Containing Beverages: 2-4 shots of espresso 3 Times weekly  Typical Protein Food Choices: meat  Meals at Restaurant per week: 2    Positive Changes Since Last Visit: exercise and having breakfast daily  Struggling With: Nighttime is still tough with cravings    Getting Adequate Protein: yes    Exercise: 3 days weekly for 60 minutes will do cardio and weight lifting at gym. Twice a week will work out in basement      ROS: updated 5/14/21  General  Fatigue: Yes  Sleep Quality: good  Birth Control: Yes OCP just in the past month started taking  HEENT  Hx of glaucoma: No  Vision changes: No  Cardiovascular  Chest Pain with Exertion: No  Palpitations: No  Hx of heart disease: No  Pulmonary  Shortness of breath at rest: No  Shortness of breath with exertion: No  Snoring: yes  Gastrointestinal  Heartburn: No  Abdominal pain: No  History of pancreatitis:  No  Severe constipation: some  Gastrourinary  History of kidney stones: No  Psychiatric  Moods Stable: No  History of drug abuse: No  Endocrine  Polydipsia: No  Polyuria: No  Personal or family history of thyroid cancer:  No  Neurologic:  Hx of seizures: No  Hx of paresthesias:No       MEDICATIONS:   Current Outpatient Medications   Medication     Acetaminophen-Caffeine (EXCEDRIN TENSION HEADACHE PO)     albuterol (PROAIR HFA/PROVENTIL HFA/VENTOLIN HFA) 108 (90 Base) MCG/ACT inhaler     ALPRAZolam (XANAX) 0.5 MG tablet     amphetamine-dextroamphetamine (ADDERALL XR) 20 MG 24 hr capsule     amphetamine-dextroamphetamine (ADDERALL) 10 MG tablet     Aspirin-Acetaminophen-Caffeine (EXCEDRIN MIGRAINE PO)     liraglutide (VICTOZA) 18  "MG/3ML solution     norethindrone-ethinyl estradiol (LOESTRIN 1.5/30, 21,) 1.5-30 MG-MCG tablet     No current facility-administered medications for this visit.        PE:  Ht 5' 3\" (1.6 m)   Wt 227 lb (103 kg)   BMI 40.21 kg/m    GENERAL Pt sounds in NAD      ASSESSMENT/PLAN:   Class 3 severe obesity due to excess calories with Body Mass Index (BMI) of 42  High Cholesterol  Knee pain     Congratulated patient on her weight loss!  Reinforced the importance of lifestyle with long term success.  She will continue to work with her therapist and on her diet and exercise.  She can follow up with provider in 1-2 months or prn.        Corry Gonzáles MS, PA-C        "

## 2021-05-14 NOTE — Clinical Note
Hello - Just FYI that I recently completed a Diagnostic Assessment for this patient, patient appears to struggle more with anxiety than depression at this time, also reports using Cannabis multiple times per day.  Patient did not show for her most recent appointment with me.  Hope to see her in June.  I'd be glad to coordinate care in the future if she would like.    Thanks,  CARLI Rodriguez

## 2021-05-15 ASSESSMENT — ANXIETY QUESTIONNAIRES: GAD7 TOTAL SCORE: 8

## 2021-05-24 ENCOUNTER — TELEPHONE (OUTPATIENT)
Dept: PSYCHOLOGY | Facility: OTHER | Age: 26
End: 2021-05-24

## 2021-05-24 NOTE — TELEPHONE ENCOUNTER
Patient was scheduled for 4 pm session.  Patient did not respond to two attempts to connect via video. Attempted to reach pt by phone 2x, left vmail regarding missed appointment.  In message reminded pt of other scheduled appts with this writer.  Also provided contact information for this writer and scheduling.

## 2021-05-26 ENCOUNTER — VIRTUAL VISIT (OUTPATIENT)
Dept: PHARMACY | Facility: PHYSICIAN GROUP | Age: 26
End: 2021-05-26
Payer: COMMERCIAL

## 2021-05-26 DIAGNOSIS — R51.9 NONINTRACTABLE EPISODIC HEADACHE, UNSPECIFIED HEADACHE TYPE: ICD-10-CM

## 2021-05-26 DIAGNOSIS — Z30.8 ENCOUNTER FOR OTHER CONTRACEPTIVE MANAGEMENT: ICD-10-CM

## 2021-05-26 DIAGNOSIS — E66.01 CLASS 3 SEVERE OBESITY WITHOUT SERIOUS COMORBIDITY WITH BODY MASS INDEX (BMI) OF 45.0 TO 49.9 IN ADULT, UNSPECIFIED OBESITY TYPE (H): Primary | ICD-10-CM

## 2021-05-26 DIAGNOSIS — E66.813 CLASS 3 SEVERE OBESITY WITHOUT SERIOUS COMORBIDITY WITH BODY MASS INDEX (BMI) OF 45.0 TO 49.9 IN ADULT, UNSPECIFIED OBESITY TYPE (H): Primary | ICD-10-CM

## 2021-05-26 PROCEDURE — 99607 MTMS BY PHARM ADDL 15 MIN: CPT | Performed by: PHARMACIST

## 2021-05-26 PROCEDURE — 99606 MTMS BY PHARM EST 15 MIN: CPT | Performed by: PHARMACIST

## 2021-05-26 NOTE — PROGRESS NOTES
Medication Therapy Management (MTM) Encounter    ASSESSMENT:                            Medication Adherence/Access: No issues identified    Obesity: Discussed options available and would consider trial of Trulicity 3 mg weekly to replace the daily Victoza 1.8 mg.  Will require prior authorization in hopes that higher dose may be more effective and weekly shot less bruising.  In addition the evening food cravings and migraine prevention may benefit from the use of topiramate.  Low starting dose with titration is recommended to minimize side effects.    Migraine: Given the need for prophylactic agent for migraines and looking for help with food cravings and weight loss topiramate is a good agent to consider.  Patient will need to remain on birth control given risks associated with birth defects and topiramate and therapy should be reevaluated if patient is considering pregnancy.     Contraception: Stable.    PLAN:                            1. Attempt to get coverage for Trulicity 3mg weekly vs Victoza 1.8mg daily- daily shots causing extra bruising and hoping for additional weight loss with higher doses available with Trulicity.     2.  Topiramate 25 mg daily for 7 days then increase to 50 mg daily.       Follow-up: Return in 3 weeks (on 6/16/2021) for Phone call with MTM-10:30am.    SUBJECTIVE/OBJECTIVE:                          Katia Rizvi is a 26 year old female called for a follow-up visit. She was referred to me from Dr. Peters.  Today's visit is a follow-up MTM visit from 3/10     Reason for visit: Medication follow up.    Allergies/ADRs: Reviewed in chart  Tobacco: She reports that she has never smoked. She has never used smokeless tobacco.  Alcohol: not currently using  Caffeine: 4-5 cups/day of coffee  Activity: limited  Past Medical History: Reviewed in chart    Medication Adherence/Access: doesn't typically take daily medication so is thinking weekly medication may be easier for her to take.      Obesity: Victoza 1.8mg daily - Saxenda was not covered so unable to go higher on the dosing.   Feeling like the benefits are no longer showing for appetite suppression but has seen success over the last few months with her lifestyle changes.  She is wondering if there is additional medication to help with her food cravings in the evenings when eating is the hardest.   Is having significant bruising from daily Victoza shots wondering if there is an alternative way to give this medication.  Saxenda was not covered.  Other formulary option is Bydureon which is not currently available, so Bydureon BCise, Trulicity, Ozempic would all need prior authorization for coverage.  Patient is not currently experiencing any side effects.   Starting weight in Dec 2020= 268lbs.   Going to the gym three times per week, exercise at home 2x per week.    Therapist initiated-and feels this is going well.  Referral for dietician also placed - last visit on 4/13, no longer seeing them because just didn't feel it was a good fit given she wants to continue keto diet.     In the past has done WW, meal preps, shakes.   Drinking water or coffee.   Still following Keto diet and eating 3 meals per day vs skipping meals.     Migraine: Patient was prescribed propranolol 60 mg once daily she took 1 dose and felt extremely nauseous and uncomfortable so stopped taking this and is worried about trying to take this again.  Wondering if this is the best option for migraine prevention.  Has not been on any other agents.  Has Excedrin Migraine for emergency use for headaches. this works well.  Denies side effects at this time.    Contraception: Taking Loestrin by PCP for OCP, was nervous about the weight gain however this has not been a concern as her weight continues to improve with her medications and lifestyle changes.  Wt Readings from Last 4 Encounters:   05/14/21 227 lb (103 kg)   05/11/21 230 lb (104.3 kg)   04/13/21 237 lb (107.5 kg)   04/08/21  235 lb (106.6 kg)         Today's Vitals: There were no vitals taken for this visit.  ----------------      I spent 20 minutes with this patient today. All changes were made via collaborative practice agreement with Dr. Peters. A copy of the visit note was provided to the patient's primary care provider.    The patient was sent via [employer] a summary of these recommendations.     Keri Arenas, Pharm.D, [facility] [place]  Medication Therapy Management Pharmacist  [phone]      Telemedicine Visit Details  Type of service:  Telephone visit  Start Time: 2:37 PM  End Time: 2:57 PM  Originating Location (patient location): [place]  Distant Location (provider location):  [facility] MTM        Medication Therapy Recommendations  Class 3 severe obesity due to excess calories with serious comorbidity and body mass index (BMI) of 40.0 to 44.9 in adult (H)    Current Medication: Dulaglutide (TRULICITY) 3 MG/0.5ML SOPN   Rationale: More effective medication available - Ineffective medication - Effectiveness   Recommendation: Change Medication - Trulicity 3 MG/0.5ML Sopn - Stop Victoza and start Trulicity 3 mg weekly   Status: Accepted per CPA         Nonintractable episodic headache, unspecified headache type    Current Medication: topiramate (TOPAMAX) 50 MG tablet   Rationale: Untreated condition - Needs additional medication therapy - Indication   Recommendation: Start Medication - topiramate 50 MG tablet - Start 1/2 tablet daily for 7 days then 1 daily   Status: Accepted per Provider

## 2021-05-27 RX ORDER — DULAGLUTIDE 3 MG/.5ML
3 INJECTION, SOLUTION SUBCUTANEOUS WEEKLY
Qty: 2 ML | COMMUNITY
Start: 2021-05-27 | End: 2021-06-16

## 2021-05-27 RX ORDER — TOPIRAMATE 50 MG/1
50 TABLET, FILM COATED ORAL DAILY
Qty: 30 TABLET | Refills: 1 | COMMUNITY
Start: 2021-05-27 | End: 2021-07-28

## 2021-05-27 NOTE — PATIENT INSTRUCTIONS
Recommendations from today's MTM visit:                                                         1. Attempt to get coverage for Trulicity 3mg weekly vs Victoza 1.8mg daily- daily shots causing extra bruising and hoping for additional weight loss with higher doses available with Trulicity.     2.  Topiramate 25 mg daily for 7 days then increase to 50 mg daily.         Follow-up: Return in 3 weeks (on 6/16/2021) for Phone call with MTM-10:30am.    It was great to speak with you today.  I value your experience and would be very thankful for your time with providing feedback on our clinic survey. You may receive a survey via email or text message in the next few days.     To schedule another MTM appointment, please call the clinic directly or you may call the MTM scheduling line at [phone] or toll-free at [phone].     My Clinical Pharmacist's contact information:                                                      Please feel free to contact me with any questions or concerns you have.      Keri Arenas, Pharm.D, BCACP  Medication Therapy Management Pharmacist  [phone]

## 2021-06-16 ENCOUNTER — VIRTUAL VISIT (OUTPATIENT)
Dept: PHARMACY | Facility: PHYSICIAN GROUP | Age: 26
End: 2021-06-16
Payer: COMMERCIAL

## 2021-06-16 DIAGNOSIS — Z30.9 ENCOUNTER FOR CONTRACEPTIVE MANAGEMENT, UNSPECIFIED TYPE: ICD-10-CM

## 2021-06-16 DIAGNOSIS — R51.9 NONINTRACTABLE EPISODIC HEADACHE, UNSPECIFIED HEADACHE TYPE: ICD-10-CM

## 2021-06-16 DIAGNOSIS — E66.01 CLASS 3 SEVERE OBESITY WITHOUT SERIOUS COMORBIDITY WITH BODY MASS INDEX (BMI) OF 45.0 TO 49.9 IN ADULT, UNSPECIFIED OBESITY TYPE (H): Primary | ICD-10-CM

## 2021-06-16 DIAGNOSIS — E66.813 CLASS 3 SEVERE OBESITY WITHOUT SERIOUS COMORBIDITY WITH BODY MASS INDEX (BMI) OF 45.0 TO 49.9 IN ADULT, UNSPECIFIED OBESITY TYPE (H): Primary | ICD-10-CM

## 2021-06-16 PROCEDURE — 99607 MTMS BY PHARM ADDL 15 MIN: CPT | Performed by: PHARMACIST

## 2021-06-16 PROCEDURE — 99606 MTMS BY PHARM EST 15 MIN: CPT | Performed by: PHARMACIST

## 2021-06-16 RX ORDER — LIRAGLUTIDE 6 MG/ML
1.8 INJECTION SUBCUTANEOUS DAILY
COMMUNITY
End: 2021-07-28

## 2021-06-16 NOTE — PROGRESS NOTES
Medication Therapy Management (MTM) Encounter    ASSESSMENT:                            Medication Adherence/Access: No issues identified    Obesity: Will try to move topamax up in the evening to dinner time, if not helping any with appetite in the evening.      Migraine: No change but feels this is doing okay with or without topiramate.     Contraception: Continue same for now.     PLAN:                            1. Okay to move topamax up to dinner time.     2. Patient will call to set up a follow up with RD.      Follow-up: Return in 4 weeks (on 7/14/2021) for Phone call with MTM.    SUBJECTIVE/OBJECTIVE:                          Katia Rizvi is a 26 year old female called for a follow-up visit. She was referred to me from Dr. Peters.  Today's visit is a follow-up MTM visit from 5/26     Reason for visit: weight loss.    Allergies/ADRs: Reviewed in chart  Tobacco: She reports that she has never smoked. She has never used smokeless tobacco.  Alcohol: not currently using  Caffeine: 4-5 cups/day of coffee  Activity: limited  Past Medical History: Reviewed in chart    Medication Adherence/Access: denies issues at this time.     Obesity: Victoza 1.8mg daily.  Tolerating injections well however did have her belly button irritated once, limited rash ring, it did go away.  Stopped victoza for 3 days and noticed significant increase in appetite when off the medication.  She feels that the medication has made a big difference.  Trulicity high dose and Saxenda were not covered so unable to go higher on the dosing.   Patient is not currently experiencing any side effects.   Starting weight in Dec 2020= 268lbs.   Yesterday (6/16/2020) = 211lbs  Going to the gym three times per week, exercise at home 2x per week.    Therapist initiated- missed last appointment.   Referral for dietician also placed - last visit on 4/13, no longer seeing them because just didn't feel it was a good fit given she wants to continue keto diet.   She is interested in going back to a dietitian at this time but hoping to find ways to incorporate carbohydrates into her diet in a healthy manner without causing significant backslide of her progress that she has seen with using the keto diet.  Is noticing low energy throughout the day and feels it is likely related.    In the past has done WW, meal preps, shakes.   Drinking water or coffee.     Migraine: Patient was topamax 50mg once daily, doesn't feel like it is helping with weight loss or headaches.  She is wondering if moving this up in the day will help better with the evening cravings for food. Was making her tired to start but now not doing that.  Has not had a headache in quiet awhile, so not sure if the medication is helping or the diet changes.    In the past was given propranolol 60 mg once daily she took 1 dose and felt extremely nauseous and uncomfortable so stopped taking this and is worried about trying to take this again.  Has Excedrin Migraine for emergency use for headaches. this works well.  We will get these headaches around her.  But has not had a regular.  Since being on continuous birth control noted below.  Will still have breakthrough bleeding.  Denies side effects at this time.    Contraception: Taking Loestrin by PCP for OCP, was nervous about the weight gain however this has not been a concern as her weight continues to improve with her medications and lifestyle changes.      Today's Vitals: There were no vitals taken for this visit.  ----------------    I spent 30 minutes with this patient today. All changes were made via collaborative practice agreement with Dr. Peters. A copy of the visit note was provided to the patient's primary care provider.    The patient was given a summary of these recommendations.     Keri Arenas, Pharm.D, [facility]  Medication Therapy Management Pharmacist  [phone]    Telemedicine Visit Details  Type of service:  Telephone visit  Start Time: 11:04  AM  End Time: 11:34 AM  Originating Location (patient location): Home  Distant Location (provider location):  [facility]        Medication Therapy Recommendations  Class 3 severe obesity due to excess calories with serious comorbidity and body mass index (BMI) of 40.0 to 44.9 in adult (H)    Current Medication: topiramate (TOPAMAX) 50 MG tablet   Rationale: Incorrect administration - Dosage too low - Effectiveness   Recommendation: Change Administration Time - topiramate 50 MG tablet - Move medication   Status: Patient Agreed - Adherence/Education

## 2021-06-16 NOTE — PATIENT INSTRUCTIONS
Recommendations from today's MTM visit:                                                    1. Okay to move topamax up to dinner time.     2. Patient will call to set up a follow up with RD.      Follow-up: Return in 4 weeks (on 7/14/2021) for Phone call with MTM.    It was great to speak with you today.  I value your experience and would be very thankful for your time with providing feedback on our clinic survey. You may receive a survey via email or text message in the next few days.     To schedule another MTM appointment, please call the clinic directly or you may call the MTM scheduling line at [phone] or toll-free at [phone].     My Clinical Pharmacist's contact information:                                                      Please feel free to contact me with any questions or concerns you have.      Keri Arenas, Pharm.D, [facility]  Medication Therapy Management Pharmacist  [phone]

## 2021-06-23 ENCOUNTER — VIRTUAL VISIT (OUTPATIENT)
Dept: PSYCHOLOGY | Facility: CLINIC | Age: 26
End: 2021-06-23
Payer: COMMERCIAL

## 2021-06-23 DIAGNOSIS — F41.9 ANXIETY DISORDER, UNSPECIFIED TYPE: Primary | ICD-10-CM

## 2021-06-23 PROCEDURE — 90834 PSYTX W PT 45 MINUTES: CPT | Mod: 95 | Performed by: SOCIAL WORKER

## 2021-06-23 ASSESSMENT — ANXIETY QUESTIONNAIRES
7. FEELING AFRAID AS IF SOMETHING AWFUL MIGHT HAPPEN: SEVERAL DAYS
5. BEING SO RESTLESS THAT IT IS HARD TO SIT STILL: SEVERAL DAYS
1. FEELING NERVOUS, ANXIOUS, OR ON EDGE: SEVERAL DAYS
GAD7 TOTAL SCORE: 8
6. BECOMING EASILY ANNOYED OR IRRITABLE: MORE THAN HALF THE DAYS
4. TROUBLE RELAXING: NOT AT ALL
3. WORRYING TOO MUCH ABOUT DIFFERENT THINGS: MORE THAN HALF THE DAYS
IF YOU CHECKED OFF ANY PROBLEMS ON THIS QUESTIONNAIRE, HOW DIFFICULT HAVE THESE PROBLEMS MADE IT FOR YOU TO DO YOUR WORK, TAKE CARE OF THINGS AT HOME, OR GET ALONG WITH OTHER PEOPLE: VERY DIFFICULT
2. NOT BEING ABLE TO STOP OR CONTROL WORRYING: SEVERAL DAYS

## 2021-06-23 ASSESSMENT — PATIENT HEALTH QUESTIONNAIRE - PHQ9: SUM OF ALL RESPONSES TO PHQ QUESTIONS 1-9: 4
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
## 2021-06-24 ASSESSMENT — ANXIETY QUESTIONNAIRES: GAD7 TOTAL SCORE: 8

## 2021-06-30 ENCOUNTER — VIRTUAL VISIT (OUTPATIENT)
Dept: SURGERY | Facility: CLINIC | Age: 26
End: 2021-06-30
Payer: COMMERCIAL

## 2021-06-30 VITALS — WEIGHT: 208 LBS | BODY MASS INDEX: 38.28 KG/M2 | HEIGHT: 62 IN

## 2021-06-30 DIAGNOSIS — E66.813 CLASS 3 SEVERE OBESITY DUE TO EXCESS CALORIES WITH SERIOUS COMORBIDITY AND BODY MASS INDEX (BMI) OF 40.0 TO 44.9 IN ADULT (H): ICD-10-CM

## 2021-06-30 DIAGNOSIS — E66.01 CLASS 3 SEVERE OBESITY DUE TO EXCESS CALORIES WITH SERIOUS COMORBIDITY AND BODY MASS INDEX (BMI) OF 40.0 TO 44.9 IN ADULT (H): ICD-10-CM

## 2021-06-30 PROCEDURE — 97803 MED NUTRITION INDIV SUBSEQ: CPT | Mod: 95 | Performed by: DIETITIAN, REGISTERED

## 2021-06-30 ASSESSMENT — MIFFLIN-ST. JEOR: SCORE: 1631.73

## 2021-06-30 NOTE — PROGRESS NOTES
Katia is a 26 year old who is being evaluated via a billable video visit.      How would you like to obtain your AVS? MyChart  If the video visit is dropped, the invitation should be resent by: Text to cell phone: [phone]  Will anyone else be joining your video visit? No      Video Start Time: 2:36pm       Video-Visit Details    Type of service:  Video Visit    Video End Time: 3:07pm    Originating Location (pt. Location): Home    Distant Location (provider location):  [facility]     Platform used for Video Visit: Sangart     MEDICAL WEIGHT LOSS FOLLOW UP    Reason for visit: medical weight loss     DIAGNOSIS:  Class II Obesity    ANTHROPOMETRICS:  Initial Weight: 235 pounds (4/8/2021)  Previous Weight: 227 pounds (5/14/2021)  Current Weight:  208 lbs 0 oz   Weight Change: -19 lbs  BMI: Body mass index is 38.04 kg/m .     Weight Loss Medications:   Victoza (through outside provider)    NUTRITION HISTORY:  Breakfast: [wakes at 9am, eats at 11am] keto toast + avocado  leftovers  Lunch:  (see breakfast - generally eats 2 meals/day)  Dinner:  [6pm] restaurant - [employer] [place]   Snacks:  Cheese sticks, beef sticks, smoothie, keto cookies  Beverage choices:  Water, coffee (w/almond or skim milk)       Dining Out:  How often do you dine out: 2-3x/week  What types of food do you order: [employer], Keto meals      Exercise:  Type: Gym   Duration: 30-60  Frequency: 1x/week     Additional Information: Pt following modified keto diet. Increased stress over the last month resulted in less gym time and increased snacking. Pt also noticing some weight loss plateaus and sugar cravings. Reviewed plate method and importance of fiber in the diet. Provided calorie intake recommendations per request. Pt hopes to pursue liposuction in near future but desires to continue with healthy lifestyle changes and weight loss.      EVALUATION/PROGRESS TOWARDS GOALS:  Previous Goals:   Focus on eating 3  meals per day at regular times - not met  Practice alternatives to boredom eating (yoga, adult coloring, biking, kick boxing) - partially met/ongoing  To focus on eating slower try eating with smaller utensils - met  Replace whipping cream in coffee with whole milk - met    Previous Nutrition Diagnosis:  Obese class III related to excess energy intake and self-monitoring deficit as evidenced by BMI of 43.35 kg/m2.  No change, modified below     Current Nutrition Diagnosis:   Obese class II related to excess energy intake and self-monitoring deficit as evidenced by BMI of 38.04 kg/m2.    INTERVENTION:    Nutrition Prescription:  Recommend modified nutrient intake by decreasing energy intake.    Implementation:    Nutrition Education (Content):    Discussed previous goals and determined new goals    Encourage physical activity    Supported patient in attempted weight loss and behavior changes    Congratulated patient on successful weight loss     Patient verbalizes understanding of diet by stating she will eat 3 meals/day and include more vegetables/fruit    Anticipate good compliance    Goals:  Eat breakfast within 1h of waking, then eat meals every 4-6 hours  Include a vegetable and/or fruit with each meal  Aim for 7134-5356 calories each day    Follow Up/Monitoring:  Other  -  patient to follow up in 4-8 weeks    Time Spent With Patient:  31 Minutes      Debra Dickson RD, LD  Clinical Dietitian

## 2021-06-30 NOTE — PATIENT INSTRUCTIONS
"Ezio Montalvo!      Great meeting you today! Here's the goals we discussed:    1. Eat breakfast within 1 hour of waking, then eat meals every 4-6 hours  - This helps prevent cravings later in the day, as well as maintain healthy metabolism    2. Include a vegetable and/or fruit with each meal  - This will start easing your body into having more fiber  - This will also help with feeling shaw, for longer    3. If counting calories, aim for 7619-2298 calories each day  - Additional nutrients to be mindful of: 60-90g protein and 25-35g fiber per day          Resources Discussed:    Fiber Sources:  http://Caliber Data/398092.pdf     My Plate:  https://www.choosemyplate.gov/     A helpful search term to type into [employer], [employer], etc is \"myplate examples.\"              Let's plan on following up in 1-2 months. This can be scheduled via our call center at . Reach out sooner with any questions or concerns. Have a great week and holiday!        Debra Dickson RD, LD  Clinical Dietitian     "

## 2021-07-09 ENCOUNTER — VIRTUAL VISIT (OUTPATIENT)
Dept: PSYCHOLOGY | Facility: CLINIC | Age: 26
End: 2021-07-09
Payer: COMMERCIAL

## 2021-07-09 DIAGNOSIS — F41.9 ANXIETY DISORDER, UNSPECIFIED TYPE: Primary | ICD-10-CM

## 2021-07-09 PROCEDURE — 90834 PSYTX W PT 45 MINUTES: CPT | Mod: 95 | Performed by: SOCIAL WORKER

## 2021-07-13 NOTE — PROGRESS NOTES
Medication Therapy Management (MTM) Encounter    ASSESSMENT:                            Medication Adherence/Access: No issues identified    Obesity: will try to increase Victoza by 0.6mg with extra pen to see if able to get better control of appetite then transition back down to covered dose of 1.8mg daily.     Migraine: Modification of diet encouraged and monitoring triggers. May be a result of the birth control choice - see below.     Birth control: Consider alternative progesterone that may offer less nausea and lower headache frequency for her. Unsure components that are fully contributing but based on her current formulation of birth control, may consider an alternative like sprintec that has more estrogen properties with lower androgen/progestin activities to see if she responds differently.     PLAN:                            1. Increase an extra 0.6mg plus 1.8mg daily, Victoza. Can try to see if high dose is covered.     2. Continue working with nutritionist.     3. See Dr. Peters for follow up on birth control options, may consider switching to different dose/formulation pill like Sprintec.       Follow-up: Return in 2 weeks (on 7/28/2021) for Phone call with MTM.    SUBJECTIVE/OBJECTIVE:                          Katia Rizvi is a 26 year old female called for a follow-up visit. She was referred to me from Dr. Peters.  Today's visit is a follow-up MTM visit from 6/16     Reason for visit: Med follow up.    Allergies/ADRs: Reviewed in chart  Tobacco: She reports that she has never smoked. She has never used smokeless tobacco.  Alcohol: not currently using  Caffeine: 4-5 cups/day of coffee  Activity: limited  Past Medical History: Reviewed in chart    Medication Adherence/Access: denies issues at this time.     Obesity: Victoza 1.8mg daily. Tolerating injections well however did have her belly button irritated once, limited rash ring, it did go away.     Took midday 1.8mg  and another 1.8 at bedtime.      Stopped Victoza for 3 days and noticed significant increase in appetite when off the medication.  She feels that the medication has made a big difference.  Trulicity high dose and Saxenda were not covered so unable to go higher on the dosing.   Patient is not currently experiencing any side effects.   Weight 208lbs   Starting weight in Dec 2020= 268lbs.   Going to the gym three times per week, exercise at home 2x per week.    Therapist initiated.   Debra decker dietician - will be seeing again next month. Feels this is a better fit for her. Frustrated lately that didn't meet her goal of 200lbs by July4th. Has been stuck at this ~210 weight for a few weeks now.   In the past has done WW, meal preps, shakes.   Drinking water or coffee, no other beverages. .     Migraine: Patient was topamax 50mg once daily at dinner to see if would be more helpful with eating. Last few weeks getting more headaches, reports 3 headaches this last week, usually always in the evening. Staying hydrated, diet has changed slightly to include slightly more carbohydrates.     Using Excedrin migraine as needed and ice pack to get rid of headaches.   On computer all day for work, wears blue light glasses.   Thinking diet changes may be impacting possibly- more eating out in the last few weeks. Only drinking water, no soda/juice/alcohol.     Contraception: Taking Loestrin by PCP for OCP, was nervous about the weight gain however this has not been a concern as her weight continued to improve with her medications and lifestyle changes, just recnetly has hit plateau. Wondering about relationship of birth control and her headaches and her cycle and her headaches. Does not want to do implant birth control and does not want shot due to weight gain.       Today's Vitals: There were no vitals taken for this visit.  ----------------      I spent 36 minutes with this patient today. All changes were made via collaborative practice agreement with   Fran. A copy of the visit note was provided to the patient's primary care provider.    The patient was sent via [employer] a summary of these recommendations.     Keri Arenas, Pharm.D, [facility]  Medication Therapy Management Pharmacist  [phone]      Telemedicine Visit Details  Type of service:  Telephone visit  Start Time: 11:04 AM  End Time: 11:40 AM  Originating Location (patient location): [place]  Distant Location (provider location):  [facility]YS[facility] MTM        Medication Therapy Recommendations  Class 3 severe obesity due to excess calories with serious comorbidity and body mass index (BMI) of 40.0 to 44.9 in adult (H)    Current Medication: liraglutide (VICTOZA) 18 MG/3ML solution   Rationale: Dose too low - Dosage too low - Effectiveness   Recommendation: Increase Dose - VICTOZA PEN 18 MG/3ML soln - add extra 0.6mg daily   Status: Accepted per CPA         Encounter for contraceptive management, unspecified type    Current Medication: norethindrone-ethinyl estradiol (LOESTRIN 1.5/30, 21,) 1.5-30 MG-MCG tablet   Rationale: Undesirable effect - Adverse medication event - Safety   Recommendation: Change Medication - Sprintec 28 0.25-35 MG-MCG Tabs - trial of alternative agent   Status: Contact Provider - Awaiting Response

## 2021-07-14 ENCOUNTER — VIRTUAL VISIT (OUTPATIENT)
Dept: PHARMACY | Facility: PHYSICIAN GROUP | Age: 26
End: 2021-07-14
Payer: COMMERCIAL

## 2021-07-14 DIAGNOSIS — E66.01 CLASS 3 SEVERE OBESITY WITHOUT SERIOUS COMORBIDITY WITH BODY MASS INDEX (BMI) OF 45.0 TO 49.9 IN ADULT, UNSPECIFIED OBESITY TYPE (H): Primary | ICD-10-CM

## 2021-07-14 DIAGNOSIS — R51.9 NONINTRACTABLE EPISODIC HEADACHE, UNSPECIFIED HEADACHE TYPE: ICD-10-CM

## 2021-07-14 DIAGNOSIS — Z30.9 ENCOUNTER FOR CONTRACEPTIVE MANAGEMENT, UNSPECIFIED TYPE: ICD-10-CM

## 2021-07-14 DIAGNOSIS — E66.813 CLASS 3 SEVERE OBESITY WITHOUT SERIOUS COMORBIDITY WITH BODY MASS INDEX (BMI) OF 45.0 TO 49.9 IN ADULT, UNSPECIFIED OBESITY TYPE (H): Primary | ICD-10-CM

## 2021-07-14 PROCEDURE — 99607 MTMS BY PHARM ADDL 15 MIN: CPT | Performed by: PHARMACIST

## 2021-07-14 PROCEDURE — 99606 MTMS BY PHARM EST 15 MIN: CPT | Performed by: PHARMACIST

## 2021-07-15 NOTE — PATIENT INSTRUCTIONS
Recommendations from today's MTM visit:                                                       1. Increase an extra 0.6mg plus 1.8mg daily, Victoza. Can try to see if high dose is covered.     2. Continue working with nutritionist.     3. See Dr. Peters for follow up on birth control options, may consider switching to different dose/formulation pill like Sprintec.     Follow-up: Return in 2 weeks (on 7/28/2021) for Phone call with MTM.    It was great to speak with you today.  I value your experience and would be very thankful for your time with providing feedback on our clinic survey. You may receive a survey via email or text message in the next few days.     To schedule another MTM appointment, please call the clinic directly or you may call the MTM scheduling line at [phone] or toll-free at [phone].     My Clinical Pharmacist's contact information:                                                      Please feel free to contact me with any questions or concerns you have.      Keri Arenas, Pharm.D, [facility]  Medication Therapy Management Pharmacist  [phone]
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
## 2021-07-28 ENCOUNTER — VIRTUAL VISIT (OUTPATIENT)
Dept: PHARMACY | Facility: PHYSICIAN GROUP | Age: 26
End: 2021-07-28
Payer: COMMERCIAL

## 2021-07-28 DIAGNOSIS — Z30.9 ENCOUNTER FOR CONTRACEPTIVE MANAGEMENT, UNSPECIFIED TYPE: ICD-10-CM

## 2021-07-28 DIAGNOSIS — R51.9 NONINTRACTABLE EPISODIC HEADACHE, UNSPECIFIED HEADACHE TYPE: ICD-10-CM

## 2021-07-28 DIAGNOSIS — E66.01 CLASS 3 SEVERE OBESITY WITHOUT SERIOUS COMORBIDITY WITH BODY MASS INDEX (BMI) OF 45.0 TO 49.9 IN ADULT, UNSPECIFIED OBESITY TYPE (H): Primary | ICD-10-CM

## 2021-07-28 DIAGNOSIS — E66.813 CLASS 3 SEVERE OBESITY WITHOUT SERIOUS COMORBIDITY WITH BODY MASS INDEX (BMI) OF 45.0 TO 49.9 IN ADULT, UNSPECIFIED OBESITY TYPE (H): Primary | ICD-10-CM

## 2021-07-28 PROCEDURE — 99606 MTMS BY PHARM EST 15 MIN: CPT | Performed by: PHARMACIST

## 2021-07-28 PROCEDURE — 99607 MTMS BY PHARM ADDL 15 MIN: CPT | Performed by: PHARMACIST

## 2021-07-28 RX ORDER — NORGESTIMATE AND ETHINYL ESTRADIOL 0.25-0.035
1 KIT ORAL DAILY
COMMUNITY
Start: 2021-07-28 | End: 2021-12-10

## 2021-07-28 RX ORDER — LIRAGLUTIDE 6 MG/ML
1.8 INJECTION SUBCUTANEOUS DAILY
COMMUNITY
Start: 2021-07-28

## 2021-07-28 NOTE — PATIENT INSTRUCTIONS
Recommendations from today's MTM visit:                                                       1. Will try for increase in victoza to 2.4mg daily, if not covered plan to stay with 1.8mg daily.     2. Continue off Topiramate for now due to side effects.     3. Switch birth control to Sprintec - 1 daily.     Follow-up: Return in 23 days (on 8/20/2021).    It was great to speak with you today.  I value your experience and would be very thankful for your time with providing feedback on our clinic survey. You may receive a survey via email or text message in the next few days.     To schedule another MTM appointment, please call the clinic directly or you may call the MTM scheduling line at [phone] or toll-free at [phone].     My Clinical Pharmacist's contact information:                                                      Please feel free to contact me with any questions or concerns you have.      Keri Arenas, Pharm.D, [facility]  Medication Therapy Management Pharmacist  [phone]

## 2021-07-28 NOTE — PROGRESS NOTES
Medication Therapy Management (MTM) Encounter    ASSESSMENT:                            Medication Adherence/Access: stopped meds on own, adjustments below.    Obesity: continued success with GLP1 use, would liek ongoing use of higher dose to see continued weight loss. May not be covered, so may need to continue with 1.8mg daily if coverage is an issue. Monitoring nausea.    Migraine:  Continue off topiramate for now, changing birth control to see if better tolerability.    Contraception: Switch to different progesterone formulation and adjust estrogen dose to see if better tolerated for her.     PLAN:                            1. Will try for increase in victoza to 2.4mg daily, if not covered plan to stay with 1.8mg daily.     2. Continue off Topiramate for now due to side effects.     3. Switch birth control to Sprintec - 1 daily.     Follow-up: Return in 23 days (on 8/20/2021).    SUBJECTIVE/OBJECTIVE:                          Katia Rizvi is a 26 year old female called for a follow-up visit. She was referred to me from Dr. Peters.  Today's visit is a follow-up MTM visit from 7/14     Reason for visit: Med follow up.    Allergies/ADRs: Reviewed in chart  Past Medical History: Reviewed in chart  Tobacco: She reports that she has never smoked. She has never used smokeless tobacco.  Alcohol: not currently using  Caffeine: 4-5 cups/day of coffee  Activity: limited  Past Medical History: Reviewed in chart    Medication Adherence/Access: denies issues at this time.     Obesity: Victoza 1.8mg daily plus 0.6mg daily (added extra 0.6mg short term last visit). Feels the medication efficacy wears off at the lower doses, but have not been able to get any of the alternatives covered for her.   The extra dose made significant difference in her appetite and overall felt so much better. Hit her July Goal weight this last week!  Trulicity high dose and Saxenda were not covered so unable to go higher on the dosing.      Tolerating injections well however did have her belly button irritated once, limited rash ring, it did go away has not recurred.  Does have nausea, but this was related to her birthcontrol and topiramate timing and not associated when she increased Victoza dosing.     Stopped Victoza for 3 days and noticed significant increase in appetite when off the medication.  She feels that the medication has made a big difference, but feels hungry at night and over time the strength of the medication impact decreases.     Patient is not currently experiencing any side effects.   Weight 200lbs    Starting weight in Dec 2020= 268lbs.   Going to the gym three times per week, exercise at home 2x per week.    Therapist initiated.   Debra decekr dietician. Feels this is a better fit for her.   In the past has done WW, meal preps, shakes.   Drinking water or coffee, no other beverages.      Migraine: Patient was on topamax 50mg once daily at dinner to see if would be more helpful with eating, but stopped this between last visit due to not feeling well with it. Wants to try off. Feels her head aches are related to food and menstrual cycle mostly.   Staying hydrated, diet has changed slightly to include slightly more carbohydrates.   Using Excedrin migraine as needed and ice pack to get rid of headaches.   On computer all day for work, wears blue light glasses.     Contraception: Stop taking Loestrin by PCP for OCP for the last few weeks as not feeling well on it, more nausea more headaches. Currently on period, would like alternative option.   Sick/Throwing up after taking at times. Only noticed this on the medication, not when doubling the Victoza doses.  Was nervous about the weight gain however this has not been a concern as her weight continued to improve with her medications and lifestyle changes, just recnetly has hit plateau. Wondering about relationship of birth control and her headaches and her cycle and her headaches.  Does not want to do implant birth control and does not want shot due to weight gain.     Today's Vitals: There were no vitals taken for this visit.  ----------------    I spent 17 minutes with this patient today. All changes were made via collaborative practice agreement with . A copy of the visit note was provided to the patient's primary care provider.    The patient was given a summary of these recommendations.     Keri Arenas, Pharm.D, [facility]  Medication Therapy Management Pharmacist  [phone]      Telemedicine Visit Details  Type of service:  Telephone visit  Start Time: 2:51 PM  End Time: 3:08 PM  Originating Location (patient location): [place]  Distant Location (provider location):  [facility]     Medication Therapy Recommendations  Class 3 severe obesity due to excess calories with serious comorbidity and body mass index (BMI) of 40.0 to 44.9 in adult (H)    Current Medication: liraglutide (VICTOZA) 18 MG/3ML solution   Rationale: Dose too low - Dosage too low - Effectiveness   Recommendation: Increase Dose - VICTOZA PEN 18 MG/3ML soln - 2.4mg daily   Status: Accepted per CPA         Encounter for contraceptive management, unspecified type    Current Medication: norgestimate-ethinyl estradiol (ORTHO-CYCLEN) 0.25-35 MG-MCG tablet   Rationale: Undesirable effect - Adverse medication event - Safety   Recommendation: Change Medication - Sprintec 28 0.25-35 MG-MCG Tabs - change to sprintec   Status: Accepted per CPA

## 2021-08-09 ENCOUNTER — VIRTUAL VISIT (OUTPATIENT)
Dept: PSYCHOLOGY | Facility: CLINIC | Age: 26
End: 2021-08-09
Payer: COMMERCIAL

## 2021-08-09 DIAGNOSIS — F41.9 ANXIETY DISORDER, UNSPECIFIED TYPE: Primary | ICD-10-CM

## 2021-08-09 PROCEDURE — 90834 PSYTX W PT 45 MINUTES: CPT | Mod: 95 | Performed by: SOCIAL WORKER

## 2021-08-09 ASSESSMENT — ANXIETY QUESTIONNAIRES
2. NOT BEING ABLE TO STOP OR CONTROL WORRYING: SEVERAL DAYS
5. BEING SO RESTLESS THAT IT IS HARD TO SIT STILL: SEVERAL DAYS
6. BECOMING EASILY ANNOYED OR IRRITABLE: MORE THAN HALF THE DAYS
GAD7 TOTAL SCORE: 8
3. WORRYING TOO MUCH ABOUT DIFFERENT THINGS: MORE THAN HALF THE DAYS
4. TROUBLE RELAXING: NOT AT ALL
1. FEELING NERVOUS, ANXIOUS, OR ON EDGE: SEVERAL DAYS
7. FEELING AFRAID AS IF SOMETHING AWFUL MIGHT HAPPEN: SEVERAL DAYS

## 2021-08-09 ASSESSMENT — PATIENT HEALTH QUESTIONNAIRE - PHQ9: SUM OF ALL RESPONSES TO PHQ QUESTIONS 1-9: 3
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
## 2021-08-10 ASSESSMENT — ANXIETY QUESTIONNAIRES: GAD7 TOTAL SCORE: 8

## 2021-08-20 ENCOUNTER — VIRTUAL VISIT (OUTPATIENT)
Dept: PHARMACY | Facility: PHYSICIAN GROUP | Age: 26
End: 2021-08-20
Payer: COMMERCIAL

## 2021-08-20 DIAGNOSIS — Z30.9 ENCOUNTER FOR CONTRACEPTIVE MANAGEMENT, UNSPECIFIED TYPE: ICD-10-CM

## 2021-08-20 DIAGNOSIS — E66.813 CLASS 3 SEVERE OBESITY WITHOUT SERIOUS COMORBIDITY WITH BODY MASS INDEX (BMI) OF 45.0 TO 49.9 IN ADULT, UNSPECIFIED OBESITY TYPE (H): Primary | ICD-10-CM

## 2021-08-20 DIAGNOSIS — E66.01 CLASS 3 SEVERE OBESITY WITHOUT SERIOUS COMORBIDITY WITH BODY MASS INDEX (BMI) OF 45.0 TO 49.9 IN ADULT, UNSPECIFIED OBESITY TYPE (H): Primary | ICD-10-CM

## 2021-08-20 DIAGNOSIS — R51.9 NONINTRACTABLE EPISODIC HEADACHE, UNSPECIFIED HEADACHE TYPE: ICD-10-CM

## 2021-08-20 PROCEDURE — 99606 MTMS BY PHARM EST 15 MIN: CPT | Performed by: PHARMACIST

## 2021-08-20 PROCEDURE — 99607 MTMS BY PHARM ADDL 15 MIN: CPT | Performed by: PHARMACIST

## 2021-08-20 NOTE — PROGRESS NOTES
Medication Therapy Management (MTM) Encounter    ASSESSMENT:                            Medication Adherence/Access: No issues identified    Obesity: Continue same medications, but should see PCP for discussion on family history concerns and dizziness.     Migraines: Stable.    Contraception: Need to check with pharmacy to determine why prescription was not filled    PLAN:                            1. Checked with [facility] on Sprintec order, confusion on filling and issues in dispensing-  they now have it and it just has to be processed for different generic, filling today for patient.     2. Patient to schedule office visit with Dr. Peters for discussion on family history and dizziness concerns.     Follow-up: Return in 4 weeks (on 9/17/2021) for Phone call with MTM.    SUBJECTIVE/OBJECTIVE:                          Katia Rizvi is a 26 year old female called for a follow-up visit. She was referred to me from .  Today's visit is a follow-up MTM visit from 7/28     Reason for visit: Weight management.    Allergies/ADRs: Reviewed in chart  Past Medical History: Reviewed in chart  Tobacco: She reports that she has never smoked. She has never used smokeless tobacco.  Alcohol: Less than 1 beverages / week    Medication Adherence/Access: no issues reported    Obesity: Victoza 1.8mg daily (added extra 0.6mg short term last visit, cannot get higher dose covered for her). Feels the medication efficacy wears off at the lower doses, but have not been able to get any of the alternatives covered for her.   The extra dose made significant difference in her appetite and overall felt so much better. Hit her July Goal weight this last week!Trulicity high dose and Saxenda were not covered so unable to go higher on the dosing.     Tolerating injections well however did have her belly button irritated once, limited rash ring, it did go away has not recurred.  Does have nausea, but this was related to her birthcontrol and  topiramate timing and not associated when she increased Victoza dosing.     Patient is not currently experiencing any side effects.   Weight 195lbs yesterday  Starting weight in Dec 2020= 268lbs.   Going to the gym three times per week, exercise at home 2x per week.    Therapist initiated.   Debra decker dietician. Feels this is a better fit for her. More lightheaded some times lately- worried about possible family history contributing to her symptoms. Drinking water or coffee, no other beverages.    In the past has done WW, meal preps, shakes.     Seeing psychiatrist coming up for management of her Adderall.     New changes to sleep pattern and holding the Adderall mid afternoon.     Migraine: Patient did resume the topiramate again. Did take pregnancy test last week - was negative. Still waiting on birth control- see below. Feels her head aches are related to food and menstrual cycle mostly.   Staying hydrated, diet has changed slightly to include slightly more carbohydrates.   Using Excedrin migraine as needed and ice pack to get rid of headaches.   On computer all day for work, wears blue light glasses.     Contraception: New birth control ordered but never got it. No nausea or throwing up since stopping the Loestrin.   Was nervous about the weight gain however this has not been a concern as her weight continued to improve with her medications and lifestyle changes. Wondering about relationship of birth control and her headaches and her cycle and her headaches. Does not want to do implant birth control and does not want shot due to weight gain.       Today's Vitals: There were no vitals taken for this visit.  ----------------    I spent 19 minutes with this patient today. All changes were made via collaborative practice agreement with Dr. Peters. A copy of the visit note was provided to the patient's primary care provider.    The patient was sent via [employer] a summary of these recommendations.     Keri  Deepa, Pharm.D, [facility]  Medication Therapy Management Pharmacist  [phone]      Telemedicine Visit Details  Type of service:  Telephone visit  Start Time: 10:32 AM  End Time: 10:51 AM  Originating Location (patient location): Home  Distant Location (provider location):  [facility]     Medication Therapy Recommendations  Encounter for contraceptive management, unspecified type        Current Medication: norgestimate-ethinyl estradiol (ORTHO-CYCLEN) 0.25-35 MG-MCG tablet   Rationale: Medication product not available - Adherence - Adherence   Recommendation: Provide Education - callled pharmacy to get rx filled   Status: Patient Agreed - Adherence/Education

## 2021-08-20 NOTE — PATIENT INSTRUCTIONS
Recommendations from today's MTM visit:                                                       1. Checked with [facility] on Sprintec order- they have it and it just has to be processed for different generic, filling today for patient.     2. Schedule office visit with Dr. Peters for discussion on family history, dizziness and sleep concerns.    Follow-up: Return in 4 weeks (on 9/17/2021) for Phone call with MTM.    It was great to speak with you today.  I value your experience and would be very thankful for your time with providing feedback on our clinic survey. You may receive a survey via email or text message in the next few days.     To schedule another MTM appointment, please call the clinic directly or you may call the MTM scheduling line at [phone] or toll-free at [phone].     My Clinical Pharmacist's contact information:                                                      Please feel free to contact me with any questions or concerns you have.      Keri Arenas, Pharm.D, [facility] [place]  Medication Therapy Management Pharmacist  [phone]

## 2021-08-23 ENCOUNTER — TELEPHONE (OUTPATIENT)
Dept: PSYCHOLOGY | Facility: OTHER | Age: 26
End: 2021-08-23

## 2021-08-24 NOTE — TELEPHONE ENCOUNTER
Patient was scheduled for 4 pm video therapy session.  Patient did not respond to video invite to cell and email.  Attempted 2x to reach patient on phone for session.  Left patient vmail regarding missed session, no other upcoming sessions.   Also provided contact information for scheduling and this writer.   In message asked patient to contact this writer prior to scheduling additional sessions due to multiple no showed appointments.

## 2021-09-04 NOTE — ED AVS SNAPSHOT
Emergency Department    [address] [facility] [address]    Phone:  [phone]    Fax:  [phone]                                       Katia Rizvi   MRN: [id number]    Department:   Emergency Department   Date of Visit:  9/4/2018           After Visit Summary Signature Page     I have received my discharge instructions, and my questions have been answered. I have discussed any challenges I see with this plan with the nurse or doctor.    ..........................................................................................................................................  Patient/Patient Representative Signature      ..........................................................................................................................................  Patient Representative Print Name and Relationship to Patient    ..................................................               ................................................  Date                                            Time    ..........................................................................................................................................  Reviewed by Signature/Title    ...................................................              ..............................................  Date                                                            Time          22EPIC Rev 08/18         [facility]    Medicine Progress Note - Hospitalist Service       Date of Admission:  6/11/2021    Summary:  Dayron Neri is a 67 year male with h/o HTN who presented to ED on 5/15/2021 with acute SAH after a fall with unresponsiveness.  He underwent emergent left external ventricular drain placement.  Angiogram confirmed ruptured of posterior communicating aneurysm so he also underwent craniotomy with clipping of PCOM aneurysm.  On 5/28/2021, patient had persistent cerebral vasospasm, CT head showed bilateral BAYLEE infarct and was treated with milrinone and verapamil.  On 6/1/2021, angiogram showed no evidence of vasospasm.  Extubated on 6/3/2021.  6/6/2021 he was reintubated due to hypoxia.  On 6/7/2021, patient underwent tracheostomy and PEG tube placement.  EVD removed 6/9/2021.   Patient was treated for possible pneumonia with Unasyn on 5/20/2021, It was switched to ceftriaxone the next day for sputum culture growing Klebsiella. On 5/23, sputum culture also grew Pseudomonas. Antibiotic was switched to Zosyn.  Due to increased WBCs in CSF, possibility of ventriculitis was raised. CSF culture was negative. Antibiotic was switched to cefepime and vancomycin on 5/28/2021 for 2 weeks.  Vanco was discontinued on 6/9/2021. On 6/5/2021, sputum grew ESBL so cefepime was switched to meropenem. [facility]     Since admission to [facility], patient did not make significant progression for his neurology status. Trach weaning is also lack of progression due to his cognitive impairment.       Assessment & Plan           Acute on chronic hypoxemic respiratory failure: S/p treatment pseudomonas ESBL and Klebsiella pneumonia. Has excessive secretions. Weaning has been slow. Per pulmonary, his cognition prevents him from improving further. His pulmonary status could improve more if his cognition improves.   - Has been capped on room air for day and night since 8/31. Per pulmonary, no plan for  "decannulation due to excessive secretions.   - Continue to wean per RT and pulmonary.   - Airway secretion: Glycopyrrolate was tried and stopped. 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 Last CT head done on 7/1/21, which shows resolution of previous multicompartment hemorrhage in the right frontal lobe and the ventricles.   - Need follow up with Dr. Martinez with Neurosurgery in 3 months with repeat CTA of head and neck for post-op f/u. Next CTA of head and neck mid September.     Acute metabolic encephalopathy/cognitive impairment: His current neurology status is likely his new baseline due to frontal lobe injury.  Wife is still hopeful he will improve further but it is unclear if he has the capacity to focus and learn what they are attempting to teach him during therapy.  - Continue Effexor and nuvigil     Dysphagia: On tube feeds and NPO.   - Speech therapy following. Per note from 8/17/21, despite several weeks of working with patient there has been \"no functional improvement in patient's ability to manage his oropharyngeal secretions.\" Speech discussed this with wife. Intensive dysphagia treatment has been discontinued.     Severe malnutrition: has moderate/severe subcutaneous fat loss, moderate/severe muscle loss  - continuous tube feeding per dietician     Chronic kidney disease (CKD) stage 3a: Creatinine has been stable.    Anemia: hemoglobin stable. Continue to monitor.    Essential hypertension: BP controlled. 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 Sputum culture 6/19 showed MDR pseudomonas. On Tobramycin nebs 6/24/2021 - 7/9/2021.   - Continue pulmonary toilet, " scheduled duonebs    Ventilator associated pneumonia: Completed antibiotics on July 18.    Severe debility, weakness, critical illness, deconditioning: Continue PT/OT    Hypercalcemia: unclear etiology, mild at this point. Parathyroid hormone normal. May be related to relative immobilization. Continue to monitor.    Insomnia: Continue nighttime mirtazapine 7.5 mg scheduled.       Diet: Adult Formula Drip Feeding: Continuous [employer] Peptide 1.5; Gastrostomy; Goal Rate: 90; mL/hr; Medication - Feeding Tube Flush Frequency: At least 15-30 mL water before and after medication administration and with tube clogging; Amount to Send ...    DVT Prophylaxis: Heparin SQ  Gutierrez Catheter: Not present  Central Lines: None  Code Status: Full Code      Disposition Plan   Expected discharge: to be determined    The patient's care was discussed with the Bedside Nurse and Care Coordinator/.    Yazmin Hooper MD  Hospitalist Service  [facility]  Securely message with the Vocera Web Console (learn more here)  Text page via [employer] Paging/Directory      ______________________________________________________________________    Interval History   Patient was in bed when seen and examined today. He whispered when asked how he feels. He appeared comfortable.     Data reviewed today: I reviewed all medications, new labs and imaging results over the last 24 hours.     Physical Exam   Vital Signs: Temp: 97.9  F (36.6  C) Temp src: Oral BP: 128/84 Pulse: 98   Resp: 20 SpO2: 95 % O2 Device: None (Room air)    Weight: 157 lbs 12.8 oz    General appearance: not in acute distress  HEENT: PERRL, EOMI. Trach in place.  Lungs: Clear breath sounds in bilateral lung fields  Cardiovascular: Regular rate and rhythm, normal S1-S2  Abdomen: Soft, non tender, no distension  Musculoskeletal: No joint swelling  Skin: No rash and no edema  Neurology: Awake and alert. Mumble speech and hard to understand. Moves all four  extremities.      Data   Recent Labs   Lab 09/04/21  0707 09/03/21  0640 09/02/21  0602 08/30/21  0634   WBC  --   --   --  7.1   HGB  --   --   --  10.5*   MCV  --   --   --  97   PLT  --   --   --  340    144 143 142   POTASSIUM 4.9 4.6 4.7 4.9   CHLORIDE 102 102 103 101   CO2 32* 32* 31 31   BUN 43* 41* 42* 34*   CR 0.88 0.88 0.82 0.90   ANIONGAP 8 10 9 10   RAGINI 11.1* 11.1* 10.9* 11.3*    117 118 113   ALBUMIN 3.2* 3.1* 3.1* 2.9*       Restraint:    Patient pulls his IV line and trach.     Within an hour after restraint an in person face to face assessment was completed, including an evaluation of the patient's immediate reaction to the intervention, behavioral assessment and review/assessment of history, drugs and medications, recent labs, etc., and behavioral condition.  The patient experienced: No adverse physical outcome from seclusion/restraint initiation.  The intervention of restraint or seclusion needs to continue.

## 2021-09-14 ENCOUNTER — VIRTUAL VISIT (OUTPATIENT)
Dept: SURGERY | Facility: CLINIC | Age: 26
End: 2021-09-14
Payer: COMMERCIAL

## 2021-09-14 VITALS — WEIGHT: 195 LBS | HEIGHT: 62 IN | BODY MASS INDEX: 35.88 KG/M2

## 2021-09-14 DIAGNOSIS — E66.01 CLASS 3 SEVERE OBESITY DUE TO EXCESS CALORIES WITH SERIOUS COMORBIDITY AND BODY MASS INDEX (BMI) OF 40.0 TO 44.9 IN ADULT (H): ICD-10-CM

## 2021-09-14 DIAGNOSIS — E66.813 CLASS 3 SEVERE OBESITY DUE TO EXCESS CALORIES WITH SERIOUS COMORBIDITY AND BODY MASS INDEX (BMI) OF 40.0 TO 44.9 IN ADULT (H): ICD-10-CM

## 2021-09-14 PROCEDURE — 97803 MED NUTRITION INDIV SUBSEQ: CPT | Mod: 95 | Performed by: DIETITIAN, REGISTERED

## 2021-09-14 ASSESSMENT — MIFFLIN-ST. JEOR: SCORE: 1572.76

## 2021-09-14 NOTE — PATIENT INSTRUCTIONS
Ezio Montalvo!      It was great chatting with you again today! Here's a summary of the goals we discussed:    1. Include protein at breakfast and lunch  [email or web address]     2. Add strength training to your exercise regimen  - Aim for 2-3x/week  - This is to raise your overall metabolism  - Continue with your current cardio regimen    3. Avoid consuming less than 1200 calories per day      Plan on following up in 1-2 months. This can be scheduled via our call center at . Reach out sooner with any questions or concerns. Have a great day!      Debra Dickson RD, LD  Clinical Dietitian

## 2021-09-14 NOTE — PROGRESS NOTES
"Katia is a 26 year old who is being evaluated via a billable video visit.      How would you like to obtain your AVS? MyChart  If the video visit is dropped, the invitation should be resent by: Text to cell phone: [phone]  Will anyone else be joining your video visit? No      Video Start Time: 10:42am    Video-Visit Details    Type of service:  Video Visit    Video End Time:11:17am    Originating Location (pt. Location): Home    Distant Location (provider location): Provider Remote Setting    Platform used for Video Visit: [facility]     MEDICAL WEIGHT LOSS FOLLOW UP    Reason for visit: medical weight loss     DIAGNOSIS:  Class II Obesity    ANTHROPOMETRICS:  Initial Weight: 235 pounds (4/8/2021)  Previous Weight:  208 pounds (6/30/2021)  Current Weight:  195 lbs 0 oz  Weight Change: -13 lbs since last appointment  -40 lbs overall   BMI: Body mass index is 35.67 kg/m .     Weight Loss Medications:   Victoza (through outside provider)    NUTRITION HISTORY:  Breakfast: [wakes at 8-9am, eats at 10-10:30am] fruit or toast w/avocado  toast w/cream cheese  Lunch: [ 3-4pm] spring rolls (no noodles)  soup  might skip lunch  Dinner:  [6-7pm] meat +/- vegetables +/- small amount of rice   Snacks: mostly fruit, occasional chips or veggie straws, craves sweets in the evenings - will have sugar-free chocolate   Beverage choices: water (64oz+), coffee (w/almond milk)    Dining Out:  How often do you dine out: 3x/week  What types of food do you order: chips + salsa, spring rolls + wonton soup  tacos     Exercise:  Type: gym, group classes (\"burn camp\"), bike riding, elliptical, strength training, walking  Duration: 30-60 minutes   Frequency: 3+    Additional Information: Pt pleased to be down 100 lbs overall (note weight loss prior to starting program). Has noticed occasional light-headedness/dizziness after standing up/walking too quickly. Not on blood pressure meds and denies any history of blood pressure or blood sugar " issues. Recommended f/u with PCP if continues. Reviewed rationale for including protein at each meal and the importance of moderate CHO for energy.      EVALUATION/PROGRESS TOWARDS GOALS:  Previous Goals:   Eat breakfast within 1h of waking, then eat meals every 4-6 hours - met  Include a vegetable and/or fruit with each meal - improving  Aim for 4482-7676 calories each day - met    Previous Nutrition Diagnosis:  Obese class II related to excess energy intake and self-monitoring deficit as evidenced by BMI of 38.04 kg/m2.  No change, modified below     Current Nutrition Diagnosis:   Obese class II related to excess energy intake and self-monitoring deficit as evidenced by BMI of 35.67 kg/m2.    INTERVENTION:    Nutrition Prescription:  Recommend modified nutrient intake by decreasing energy intake.    Implementation:    Nutrition Education (Content):    Discussed previous goals and determined new goals    Encourage physical activity    Supported patient in attempted weight loss and behavior changes    Congratulated patient on successful weight loss     Patient verbalizes understanding of diet by stating she will increase protein intake    Anticipate good compliance    Goals:  Include protein at breakfast and lunch  Include strength training 2-3x/week  Avoid dropping below 1200 calories per day    Follow Up/Monitoring:  Other  -  patient to follow up in 4-8 weeks    Time Spent With Patient:  35 Minutes      Debra Dickson RD, LD  Clinical Dietitian

## 2021-09-17 ENCOUNTER — VIRTUAL VISIT (OUTPATIENT)
Dept: PHARMACY | Facility: PHYSICIAN GROUP | Age: 26
End: 2021-09-17
Payer: COMMERCIAL

## 2021-09-17 DIAGNOSIS — E66.01 CLASS 3 SEVERE OBESITY WITHOUT SERIOUS COMORBIDITY WITH BODY MASS INDEX (BMI) OF 45.0 TO 49.9 IN ADULT, UNSPECIFIED OBESITY TYPE (H): Primary | ICD-10-CM

## 2021-09-17 DIAGNOSIS — R51.9 NONINTRACTABLE EPISODIC HEADACHE, UNSPECIFIED HEADACHE TYPE: ICD-10-CM

## 2021-09-17 DIAGNOSIS — E66.813 CLASS 3 SEVERE OBESITY WITHOUT SERIOUS COMORBIDITY WITH BODY MASS INDEX (BMI) OF 45.0 TO 49.9 IN ADULT, UNSPECIFIED OBESITY TYPE (H): Primary | ICD-10-CM

## 2021-09-17 DIAGNOSIS — Z30.9 ENCOUNTER FOR CONTRACEPTIVE MANAGEMENT, UNSPECIFIED TYPE: ICD-10-CM

## 2021-09-17 PROCEDURE — 99606 MTMS BY PHARM EST 15 MIN: CPT | Performed by: PHARMACIST

## 2021-09-17 NOTE — PATIENT INSTRUCTIONS
Recommendations from today's MTM visit:                                                       1. Schedule visit with Dr. Peters      Follow-up: No follow-ups on file.    It was great to speak with you today.  I value your experience and would be very thankful for your time with providing feedback on our clinic survey. You may receive a survey via email or text message in the next few days.     To schedule another MTM appointment, please call the clinic directly or you may call the MTM scheduling line at [phone] or toll-free at [phone].     My Clinical Pharmacist's contact information:                                                      Please feel free to contact me with any questions or concerns you have.     Keri Arenas, Pharm.D, [facility]  Medication Therapy Management Pharmacist  [phone]

## 2021-09-17 NOTE — PROGRESS NOTES
Medication Therapy Management (MTM) Encounter    ASSESSMENT:                            Medication Adherence/Access: self adjusting Victoza timing    Obesity: Continue with Victoza 1.8mg daily. Reviewed kinetics of the medication and how it works. Unable to get coverage for any other alternatives or higher doses. Will continue working with dietician and using daily exercise. Still need to set up follow up with Dr. Peters regarding the dizziness.     Migraine: recommend follow up with PCP to discuss if physical therapy or other intervention/work up is needed.     Contraception: Stable.    PLAN:                            1. Schedule visit with Dr. Peters      Follow-up: Return in 9 weeks (on 11/19/2021) for Phone call with MTM.    SUBJECTIVE/OBJECTIVE:                          Katia Rizvi is a 26 year old female called for a follow-up visit. She was referred to me from Dr. Peters.  Today's visit is a follow-up MTM visit from 8/20     Reason for visit: Weight loss.    Allergies/ADRs: Reviewed in chart  Past Medical History: Reviewed in chart  Tobacco: She reports that she has never smoked. She has never used smokeless tobacco.  Alcohol: not currently using    Medication Adherence/Access: no issues reported    Obesity: Victoza 1.8mg daily (occasionally will add in the extra 0.6mg at times - will alternate days to keep the efficacy up)    Feels the medication efficacy wears off at the lower doses, but have not been able to get any of the alternatives covered for her.   The extra dose made significant difference in her appetite and overall felt so much better. Trulicity high dose and Saxenda were not covered so unable to go higher on the dosing.     Tolerating injections well however did have her belly button irritated once, limited rash ring, it did go away has not recurred.  Does have nausea, but this was related to her birthcontrol and topiramate timing and not associated when she increased Victoza dosing.      Patient is not currently experiencing any side effects.   Weight still right around 195lbs still, but has not weighed in last few days.   Starting weight in Dec 2020= 268lbs.   Going to the gym three times per week, exercise at home 2x per week.    Debra decker dietician. Feels this is a better fit for her. More lightheaded some times lately- worried about possible family history contributing to her symptoms. Drinking water or coffee, no other beverages.    In the past has done WW, meal preps, shakes.     Seeing psychiatrist coming up for management of her Adderall.     Migraine: feels work stress related to her headaches. On computer all day for work, wears blue light glasses.   Just went to chiropractor - this had been helpful in the past, but not .   Using Excedrin migraine as needed and ice pack to get rid of headaches. .     Staying hydrated, diet has changed slightly to include slightly more carbohydrates.     Contraception: New birth control started now (norgestimate-ethinyl estradiol 0.25-35mg) - 1 daily, denies side effects at this time.        Today's Vitals: There were no vitals taken for this visit.  ----------------    I spent 14 minutes with this patient today. All changes were made via collaborative practice agreement with . A copy of the visit note was provided to the patient's primary care provider.    The patient was given a summary of these recommendations.     Keri Arenas, Pharm.D, [facility]  Medication Therapy Management Pharmacist  [phone]      Telemedicine Visit Details  Type of service:  Telephone visit  Start Time: 11:03 AM  End Time: 11:17 AM  Originating Location (patient location): Home  Distant Location (provider location):  [facility] MTM     Medication Therapy Recommendations  No medication therapy recommendations to display

## 2021-10-02 ENCOUNTER — HEALTH MAINTENANCE LETTER (OUTPATIENT)
Age: 26
End: 2021-10-02

## 2021-12-03 ASSESSMENT — MIFFLIN-ST. JEOR: SCORE: 1530.81

## 2021-12-10 ENCOUNTER — VIRTUAL VISIT (OUTPATIENT)
Dept: PHARMACY | Facility: PHYSICIAN GROUP | Age: 26
End: 2021-12-10
Payer: COMMERCIAL

## 2021-12-10 VITALS
DIASTOLIC BLOOD PRESSURE: 71 MMHG | HEIGHT: 63 IN | SYSTOLIC BLOOD PRESSURE: 110 MMHG | WEIGHT: 184 LBS | HEART RATE: 84 BPM | BODY MASS INDEX: 32.6 KG/M2

## 2021-12-10 DIAGNOSIS — E66.811 CLASS 1 OBESITY WITHOUT SERIOUS COMORBIDITY WITH BODY MASS INDEX (BMI) OF 33.0 TO 33.9 IN ADULT, UNSPECIFIED OBESITY TYPE: Primary | ICD-10-CM

## 2021-12-10 DIAGNOSIS — Z30.9 ENCOUNTER FOR CONTRACEPTIVE MANAGEMENT, UNSPECIFIED TYPE: ICD-10-CM

## 2021-12-10 DIAGNOSIS — R51.9 NONINTRACTABLE EPISODIC HEADACHE, UNSPECIFIED HEADACHE TYPE: ICD-10-CM

## 2021-12-10 PROCEDURE — 99606 MTMS BY PHARM EST 15 MIN: CPT | Performed by: PHARMACIST

## 2021-12-10 RX ORDER — LACTIC ACID, L-, CITRIC ACID MONOHYDRATE, AND POTASSIUM BITARTRATE 90; 50; 20 MG/5G; MG/5G; MG/5G
GEL VAGINAL
COMMUNITY
End: 2022-05-19

## 2021-12-10 NOTE — PATIENT INSTRUCTIONS
Recommendations from today's MTM visit:                                                       1.  Victoza and Phexxi.     Follow-up: Return in 5 weeks (on 1/14/2022) for Phone call with MTM.    It was great to speak with you today.  I value your experience and would be very thankful for your time with providing feedback on our clinic survey. You may receive a survey via email or text message in the next few days.     To schedule another MTM appointment, please call the clinic directly or you may call the MTM scheduling line at [phone] or toll-free at [phone].     My Clinical Pharmacist's contact information:                                                      Please feel free to contact me with any questions or concerns you have.      Keri Arenas, Pharm.D, [facility]  Medication Therapy Management Pharmacist  [phone]

## 2021-12-10 NOTE — PROGRESS NOTES
Medication Therapy Management (MTM) Encounter    ASSESSMENT:                            Medication Adherence/Access: See below for considerations    Obesity: need to  refill at the pharmacy, has been ready to go. Prior fill rejections were due to prescription at [facility] in ready status to be picked up.    Migraine: Stable.    Contraception:  new prescription at the pharmacy, reviewed efficacy data and that back up method is recommended.     PLAN:                            1.  Victoza and Phexxi.     Follow-up: Return in 5 weeks (on 1/14/2022) for Phone call with MTM.    SUBJECTIVE/OBJECTIVE:                          Katia Rizvi is a 26 year old female called for a follow-up visit. She was referred to me from Dr. Peters.  Today's visit is a follow-up MTM visit from 9/17     Reason for visit: Med follow up.    Allergies/ADRs: Reviewed in chart  Past Medical History: Reviewed in chart  Tobacco: She reports that she has never smoked. She has never used smokeless tobacco.  Alcohol: not currently using    Medication Adherence/Access: going back in     Obesity: Victoza 1.8mg daily (occasionally will add in the extra 0.6mg at times - will alternate days to keep the efficacy up)    Feels the medication efficacy wears off at the lower doses, but have not been able to get any of the alternatives covered for her.   The extra dose made significant difference in her appetite and overall felt so much better. Trulicity high dose and Saxenda were not covered so unable to go higher on the dosing.     Tolerating injections well however did have her belly button irritated once, limited rash ring, it did go away has not recurred.  Does have nausea, but this was related to her birthcontrol and topiramate timing and not associated when she increased Victoza dosing.     Patient is not currently experiencing any side effects.   Weight continues to come down- 180 lbs, continuing to do a December challenge 2 x week  gym. Corey going up to 3x per week.     Starting weight in Dec 2020= 268lbs.   In the past has done WW, meal preps, shakes.     Seeing psychiatrist coming up for management of her Adderall.     Migraine: feels work stress related and food/drink triggers.   On computer all day for work, wears blue light glasses.   Just went to chiropractor - this had been helpful  Using Excedrin migraine as needed and ice pack to get rid of headaches.   Staying hydrated, diet has changed slightly to include slightly more carbohydrates.     Contraception: New birth control PA approved for Phexxi but has not filled at the pharmacy yet.      ----------------    I spent 15 minutes with this patient today. All changes were made via collaborative practice agreement with Sasha Peters MD. A copy of the visit note was provided to the patient's primary care provider.    The patient was sent via [employer] a summary of these recommendations.     Keri Arenas, Pharm.D, BCACP  Medication Therapy Management Pharmacist  [phone]    Telemedicine Visit Details  Type of service:  Telephone visit  Start Time: 10:33 AM  End Time: 10: 48 AM  Originating Location (patient location): Home  Distant Location (provider location):  [facility] MTM       Medication Therapy Recommendations  No medication therapy recommendations to display

## 2022-01-11 ENCOUNTER — VIRTUAL VISIT (OUTPATIENT)
Dept: BEHAVIORAL HEALTH | Facility: CLINIC | Age: 27
End: 2022-01-11
Attending: FAMILY MEDICINE
Payer: COMMERCIAL

## 2022-01-11 VITALS — WEIGHT: 178 LBS | BODY MASS INDEX: 32.04 KG/M2

## 2022-01-11 DIAGNOSIS — F90.2 ATTENTION DEFICIT HYPERACTIVITY DISORDER (ADHD), COMBINED TYPE: Primary | ICD-10-CM

## 2022-01-11 DIAGNOSIS — F41.1 GENERALIZED ANXIETY DISORDER: ICD-10-CM

## 2022-01-11 PROCEDURE — 99204 OFFICE O/P NEW MOD 45 MIN: CPT | Mod: 95 | Performed by: NURSE PRACTITIONER

## 2022-01-11 PROCEDURE — G0463 HOSPITAL OUTPT CLINIC VISIT: HCPCS | Mod: GT,95

## 2022-01-11 RX ORDER — DEXTROAMPHETAMINE SACCHARATE, AMPHETAMINE ASPARTATE MONOHYDRATE, DEXTROAMPHETAMINE SULFATE AND AMPHETAMINE SULFATE 5; 5; 5; 5 MG/1; MG/1; MG/1; MG/1
20 CAPSULE, EXTENDED RELEASE ORAL DAILY
Qty: 30 CAPSULE | Refills: 0 | Status: SHIPPED | OUTPATIENT
Start: 2022-01-11 | End: 2022-02-10

## 2022-01-11 ASSESSMENT — ANXIETY QUESTIONNAIRES
6. BECOMING EASILY ANNOYED OR IRRITABLE: SEVERAL DAYS
1. FEELING NERVOUS, ANXIOUS, OR ON EDGE: SEVERAL DAYS
3. WORRYING TOO MUCH ABOUT DIFFERENT THINGS: SEVERAL DAYS
IF YOU CHECKED OFF ANY PROBLEMS ON THIS QUESTIONNAIRE, HOW DIFFICULT HAVE THESE PROBLEMS MADE IT FOR YOU TO DO YOUR WORK, TAKE CARE OF THINGS AT HOME, OR GET ALONG WITH OTHER PEOPLE: SOMEWHAT DIFFICULT
5. BEING SO RESTLESS THAT IT IS HARD TO SIT STILL: SEVERAL DAYS
2. NOT BEING ABLE TO STOP OR CONTROL WORRYING: SEVERAL DAYS
7. FEELING AFRAID AS IF SOMETHING AWFUL MIGHT HAPPEN: SEVERAL DAYS
GAD7 TOTAL SCORE: 6

## 2022-01-11 ASSESSMENT — PATIENT HEALTH QUESTIONNAIRE - PHQ9
5. POOR APPETITE OR OVEREATING: NOT AT ALL
SUM OF ALL RESPONSES TO PHQ QUESTIONS 1-9: 5
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
## 2022-01-11 NOTE — PATIENT INSTRUCTIONS
Continue medications as prescribed  Have your pharmacy contact us for a refill if you are running low on medications (We may ask you to come into clinic to get a refill from the nurse  No Alcohol or drug use  No driving if sedated  Call the clinic with any questions or concerns   Reach out for help if you feel like hurting yourself or others ([facility] [phone]: [address] [facility], [address] or [facility] Suicide Hotline [phone] , call 911 or go to nearest Emergency room    Follow up as directed, for your appointments, per your After Visit Summary Form.

## 2022-01-11 NOTE — NURSING NOTE
Medications Phoned  to Pharmacy [] yes [x]no  Name of Pharmacist:  List Medications, including dose, quantity and instructions     Medications ordered this visit were e-scribed.  Verified by order class [x] yes  [] no  Fluoxetine 20mg     Medication changes or discontinuations were communicated to patient's pharmacy: [] yes  [x] no     Dictation completed at time of chart check: [x] yes  [] no     I have checked the documentation for today s encounters and the above information has been reviewed and completed.        Franky Jacobson MA January 11, 2022 4:23 PM

## 2022-01-12 ASSESSMENT — ANXIETY QUESTIONNAIRES: GAD7 TOTAL SCORE: 6

## 2022-01-14 ENCOUNTER — TELEPHONE (OUTPATIENT)
Dept: PHARMACY | Facility: PHYSICIAN GROUP | Age: 27
End: 2022-01-14
Payer: COMMERCIAL

## 2022-01-14 NOTE — TELEPHONE ENCOUNTER
Left message for pt to call back and schedule follow up MTM visit after 1/22/2022.     Keri Arenas, Pharm.D, Psychiatric  Medication Therapy Management Pharmacist  [phone]

## 2022-02-23 ENCOUNTER — TRANSCRIBE ORDERS (OUTPATIENT)
Dept: OTHER | Age: 27
End: 2022-02-23

## 2022-02-23 DIAGNOSIS — M54.50 CHRONIC LOW BACK PAIN: Primary | ICD-10-CM

## 2022-02-23 DIAGNOSIS — G89.29 CHRONIC LOW BACK PAIN: Primary | ICD-10-CM

## 2022-03-11 ENCOUNTER — VIRTUAL VISIT (OUTPATIENT)
Dept: BEHAVIORAL HEALTH | Facility: CLINIC | Age: 27
End: 2022-03-11
Attending: FAMILY MEDICINE
Payer: COMMERCIAL

## 2022-03-11 VITALS — BODY MASS INDEX: 32.4 KG/M2 | WEIGHT: 180 LBS

## 2022-03-11 DIAGNOSIS — F33.1 MAJOR DEPRESSIVE DISORDER, RECURRENT EPISODE, MODERATE (H): Primary | ICD-10-CM

## 2022-03-11 DIAGNOSIS — F90.2 ADHD (ATTENTION DEFICIT HYPERACTIVITY DISORDER), COMBINED TYPE: ICD-10-CM

## 2022-03-11 PROCEDURE — G0463 HOSPITAL OUTPT CLINIC VISIT: HCPCS | Mod: TEL,95

## 2022-03-11 PROCEDURE — 99214 OFFICE O/P EST MOD 30 MIN: CPT | Mod: 95 | Performed by: NURSE PRACTITIONER

## 2022-03-11 RX ORDER — DEXTROAMPHETAMINE SACCHARATE, AMPHETAMINE ASPARTATE MONOHYDRATE, DEXTROAMPHETAMINE SULFATE AND AMPHETAMINE SULFATE 5; 5; 5; 5 MG/1; MG/1; MG/1; MG/1
40 CAPSULE, EXTENDED RELEASE ORAL DAILY
Qty: 80 CAPSULE | Refills: 0 | Status: SHIPPED | OUTPATIENT
Start: 2022-03-11 | End: 2022-04-08

## 2022-03-11 RX ORDER — FLUOXETINE 40 MG/1
40 CAPSULE ORAL DAILY
Qty: 30 CAPSULE | Refills: 1 | Status: SHIPPED | OUTPATIENT
Start: 2022-03-11 | End: 2022-04-08

## 2022-03-11 RX ORDER — ALPRAZOLAM 0.5 MG
0.5 TABLET ORAL
Status: CANCELLED | OUTPATIENT
Start: 2022-03-11

## 2022-03-11 RX ORDER — DEXTROAMPHETAMINE SACCHARATE, AMPHETAMINE ASPARTATE, DEXTROAMPHETAMINE SULFATE AND AMPHETAMINE SULFATE 2.5; 2.5; 2.5; 2.5 MG/1; MG/1; MG/1; MG/1
10 TABLET ORAL DAILY
Qty: 30 TABLET | Refills: 0 | Status: SHIPPED | OUTPATIENT
Start: 2022-03-11 | End: 2022-04-08
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
## 2022-03-11 NOTE — NURSING NOTE
This video/telephone visit will be conducted via a call between you and your physician/provider. We have found that certain health care needs can be provided without the need for an in-person physical exam. This service lets us provide the care you need with a video /telephone conversation. If a prescription is necessary we can send it directly to your pharmacy. If lab work is needed we can place an order for that and you can then stop by our lab to have the test done at a later time.    Just as we bill insurance for in-person visits, we also bill insurance for video/telephone visits. If you have questions about your insurance coverage, we recommend that you speak with your insurance company.    Patient has given verbal consent for video/Telephone visit? yes    Patient would like telephone visit, please connect : [phone]    Reason for visit: follow up  Patient verified allergies, medications and pharmacy via phone.     ODALIS-7 scores:    ODALIS-7 SCORE 8/9/2021 1/11/2022   Total Score - -   Total Score 8 6       PHQ-9 scores:   PHQ-9 SCORE 8/9/2021 1/11/2022   PHQ-9 Total Score MyChart - -   PHQ-9 Total Score 3 5       Patient states she is ready for visit.    Franky Jacobson MA March 11, 2022 8:38 AM      Medications Phoned  to Pharmacy [] yes [x]no     Medications ordered this visit were e-scribed.  Verified by order class [x] yes  [] no  List medications sent to pharmacy:   Adderall 10mg and 20mg  prozac 40mg  Medication changes or discontinuations were communicated to patient's pharmacy: [] yes  [x] no     Dictation completed at time of chart check: [x] yes  [] no     I have checked the documentation for today s encounters and the above information has been reviewed and completed.        Franky Jacobson MA March 11, 2022 1:29 PM     The ABCs of the Annual Wellness Visit  Subsequent Medicare Wellness Visit    Chief Complaint   Patient presents with   • Medicare Wellness-subsequent       Subjective   History of Present Illness:  Nelson Callahan is a 33 y.o. male who presents for a Subsequent Medicare Wellness Visit.    He presents for follow up of essential hypertension, mixed hyperlipidemia, and asthma.  He reports good compliance and tolerance with medications. He is not using his inhaler more than usual. He states he is doing well. He has moved to [place] and would like to transfer to the [facility] in [place].    HEALTH RISK ASSESSMENT    Recent Hospitalizations:  Recently treated at the following:  [facility]    Current Medical Providers:  Patient Care Team:  Ludmila Dumont APRN as PCP - General (Family Medicine)    Smoking Status:  Social History     Tobacco Use   Smoking Status Current Every Day Smoker   • Packs/day: 0.50   • Years: 14.00   • Pack years: 7.00   • Types: Cigarettes   Smokeless Tobacco Never Used       Alcohol Consumption:  Social History     Substance and Sexual Activity   Alcohol Use Yes    Comment: I dont know       Depression Screen:   PHQ-2/PHQ-9 Depression Screening 4/14/2021   Little interest or pleasure in doing things 0   Feeling down, depressed, or hopeless 0   Trouble falling or staying asleep, or sleeping too much -   Feeling tired or having little energy -   Poor appetite or overeating -   Feeling bad about yourself - or that you are a failure or have let yourself or your family down -   Trouble concentrating on things, such as reading the newspaper or watching television -   Moving or speaking so slowly that other people could have noticed. Or the opposite - being so fidgety or restless that you have been moving around a lot more than usual -   Thoughts that you would be better off dead, or of hurting yourself in some way -   Total Score 0   If you checked off any problems, how  difficult have these problems made it for you to do your work, take care of things at home, or get along with other people? -       Fall Risk Screen:  SANDRA Fall Risk Assessment has not been completed.    Health Habits and Functional and Cognitive Screening:  Functional & Cognitive Status 4/14/2021   Do you have difficulty preparing food and eating? No   Do you have difficulty bathing yourself, getting dressed or grooming yourself? No   Do you have difficulty using the toilet? No   Do you have difficulty moving around from place to place? No   Do you have trouble with steps or getting out of a bed or a chair? No   Current Diet Well Balanced Diet   Dental Exam Up to date   Eye Exam Up to date   Exercise (times per week) 4 times per week   Current Exercise Activities Include Walking   Do you need help using the phone?  No   Are you deaf or do you have serious difficulty hearing?  No   Do you need help with transportation? No   Do you need help shopping? No   Do you need help preparing meals?  No   Do you need help with housework?  No   Do you need help with laundry? No   Do you need help taking your medications? No   Do you need help managing money? No   Do you ever drive or ride in a car without wearing a seat belt? No   Have you felt unusual stress, anger or loneliness in the last month? No   Who do you live with? Alone   If you need help, do you have trouble finding someone available to you? No   Have you been bothered in the last four weeks by sexual problems? No   Do you have difficulty concentrating, remembering or making decisions? Yes         Does the patient have evidence of cognitive impairment? Yes    Asprin use counseling:Does not need ASA (and currently is not on it)    Age-appropriate Screening Schedule:  Refer to the list below for future screening recommendations based on patient's age, sex and/or medical conditions. Orders for these recommended tests are listed in the plan section. The patient has  been provided with a written plan.    Health Maintenance   Topic Date Due   • TDAP/TD VACCINES (1 - Tdap) Never done   • INFLUENZA VACCINE  08/01/2021   • LIPID PANEL  10/19/2021          The following portions of the patient's history were reviewed and updated as appropriate: allergies, current medications, past family history, past medical history, past social history, past surgical history and problem list.    Outpatient Medications Prior to Visit   Medication Sig Dispense Refill   • ARIPiprazole (ABILIFY) 5 MG tablet Take 1 tablet by mouth Daily. Indications: Major Depressive Disorder 30 tablet 0   • sertraline (ZOLOFT) 100 MG tablet Take 1 tablet by mouth 2 (Two) Times a Day. Indications: Major Depressive Disorder 60 tablet 0   • traZODone (DESYREL) 150 MG tablet Take 1 tablet by mouth Every Night. 30 tablet 1   • albuterol sulfate  (90 Base) MCG/ACT inhaler Inhale 2 puffs Every 4 (Four) Hours As Needed for Wheezing. 18 g 2   • ezetimibe (ZETIA) 10 MG tablet Take 1 tablet by mouth Daily. 30 tablet 2   • fluticasone (Flonase) 50 MCG/ACT nasal spray 2 sprays into the nostril(s) as directed by provider Daily. 18.2 mL 2   • metoprolol succinate XL (TOPROL-XL) 25 MG 24 hr tablet Take 1 tablet by mouth Daily. Indications: High Blood Pressure Disorder 30 tablet 0   • pantoprazole (PROTONIX) 40 MG EC tablet Take 1 tablet by mouth Daily. 30 tablet 5   • simvastatin (ZOCOR) 10 MG tablet Take 1 tablet by mouth Every Night. 30 tablet 2     No facility-administered medications prior to visit.       Patient Active Problem List   Diagnosis   • Schizoaffective disorder (CMS/HCC)   • Intellectual disability   • HTN (hypertension)   • Acid reflux   • High cholesterol       Advanced Care Planning:  ACP discussion was held with the patient during this visit. Patient does not have an advance directive, information provided.    Review of Systems   Constitutional: Negative for fever and unexpected weight change.   HENT:  "Negative for ear pain, rhinorrhea, sore throat and trouble swallowing.    Eyes: Negative for visual disturbance.   Respiratory: Negative for cough, shortness of breath and wheezing.    Cardiovascular: Negative for chest pain and palpitations.   Gastrointestinal: Negative for abdominal pain, blood in stool, constipation, diarrhea, nausea and vomiting.   Genitourinary: Negative for dysuria.   Musculoskeletal: Negative for arthralgias and myalgias.   Skin: Negative for color change.   Allergic/Immunologic: Negative for environmental allergies.   Neurological: Negative for dizziness.   Hematological: Negative for adenopathy.   Psychiatric/Behavioral: Positive for sleep disturbance. Negative for suicidal ideas. The patient is not nervous/anxious.        Compared to one year ago, the patient feels his physical health is the same.  Compared to one year ago, the patient feels his mental health is better.    Reviewed chart for potential of high risk medication in the elderly: not applicable  Reviewed chart for potential of harmful drug interactions in the elderly:not applicable    Objective         Vitals:    04/14/21 1323   BP: 120/90   BP Location: Right arm   Patient Position: Sitting   Cuff Size: Adult   Pulse: 95   Resp: 16   Temp: 98.9 °F (37.2 °C)   TempSrc: Temporal   SpO2: 98%   Weight: 108 kg (239 lb)   Height: 180.3 cm (70.98\")       Body mass index is 33.35 kg/m².  Discussed the patient's BMI with him. The BMI is above average; BMI management plan is completed.    Physical Exam  Vitals reviewed.   Constitutional:       General: He is not in acute distress.     Appearance: He is well-developed. He is not diaphoretic.   HENT:      Head: Normocephalic and atraumatic.   Cardiovascular:      Rate and Rhythm: Normal rate and regular rhythm.      Heart sounds: Normal heart sounds. No murmur heard.   No friction rub. No gallop.    Pulmonary:      Effort: Pulmonary effort is normal. No respiratory distress.      Breath " sounds: Normal breath sounds.   Abdominal:      General: Bowel sounds are normal. There is no distension.      Palpations: Abdomen is soft.      Tenderness: There is no abdominal tenderness.   Skin:     General: Skin is warm and dry.   Neurological:      Mental Status: He is alert and oriented to person, place, and time.   Psychiatric:         Behavior: Behavior normal.         Thought Content: Thought content normal.         Judgment: Judgment normal.               Assessment/Plan   Medicare Risks and Personalized Health Plan  CMS Preventative Services Quick Reference  Advance Directive Discussion  Immunizations Discussed/Encouraged (specific immunizations; Covid vaccine )    The above risks/problems have been discussed with the patient.  Pertinent information has been shared with the patient in the After Visit Summary.  Follow up plans and orders are seen below in the Assessment/Plan Section.    Diagnoses and all orders for this visit:    1. Essential hypertension (Primary)  -     CBC & Differential  -     Comprehensive Metabolic Panel  -     Lipid Panel  -     metoprolol succinate XL (TOPROL-XL) 25 MG 24 hr tablet; Take 1 tablet by mouth Daily. Indications: High Blood Pressure Disorder  Dispense: 30 tablet; Refill: 5    2. Exercise-induced asthma  -     albuterol sulfate  (90 Base) MCG/ACT inhaler; Inhale 2 puffs Every 4 (Four) Hours As Needed for Wheezing. Indications: Spasm of Lung Air Passages  Dispense: 18 g; Refill: 5    3. Mixed hyperlipidemia  -     Comprehensive Metabolic Panel  -     Lipid Panel  -     ezetimibe (ZETIA) 10 MG tablet; Take 1 tablet by mouth Daily. Indications: High Amount of Fats in the Blood  Dispense: 30 tablet; Refill: 5  -     simvastatin (ZOCOR) 10 MG tablet; Take 1 tablet by mouth Every Night. Indications: High Amount of Fats in the Blood  Dispense: 30 tablet; Refill: 5    4. Seasonal allergies  -     fluticasone (Flonase) 50 MCG/ACT nasal spray; 2 sprays into the nostril(s)  as directed by provider Daily. Indications: Signs and Symptoms of Nose Diseases  Dispense: 18.2 mL; Refill: 5    5. Gastroesophageal reflux disease, unspecified whether esophagitis present  -     pantoprazole (PROTONIX) 40 MG EC tablet; Take 1 tablet by mouth Daily. Indications: Gastroesophageal Reflux Disease  Dispense: 30 tablet; Refill: 5      Follow Up:  No follow-ups on file.     An After Visit Summary and PPPS were given to the patient.       Plan: Continue current medications. Get labs today. He declines the COVID vaccine for now. Follow up in 6 months and as needed.

## 2022-03-11 NOTE — PATIENT INSTRUCTIONS
Continue medications as prescribed  Have your pharmacy contact us for a refill if you are running low on medications (We may ask you to come into clinic to get a refill from the nurse  No Alcohol or drug use  No driving if sedated  Call the clinic with any questions or concerns   Reach out for help if you feel like hurting yourself or others ([facility] [phone]: [address] [place], [address] or [facility] Suicide Hotline [phone] , call 911 or go to nearest Emergency room   Follow up as directed, for your appointments, per your After Visit Summary Form.

## 2022-03-19 ENCOUNTER — HEALTH MAINTENANCE LETTER (OUTPATIENT)
Age: 27
End: 2022-03-19

## 2022-03-25 ENCOUNTER — HOSPITAL ENCOUNTER (EMERGENCY)
Facility: CLINIC | Age: 27
Discharge: HOME | End: 2022-03-25
Attending: EMERGENCY MEDICINE | Admitting: EMERGENCY MEDICINE
Payer: COMMERCIAL

## 2022-03-25 VITALS
TEMPERATURE: 98.2 F | DIASTOLIC BLOOD PRESSURE: 59 MMHG | SYSTOLIC BLOOD PRESSURE: 113 MMHG | WEIGHT: 170 LBS | OXYGEN SATURATION: 100 % | RESPIRATION RATE: 16 BRPM | HEART RATE: 80 BPM | BODY MASS INDEX: 30.12 KG/M2 | HEIGHT: 63 IN

## 2022-03-25 DIAGNOSIS — R11.2 NON-INTRACTABLE VOMITING WITH NAUSEA, UNSPECIFIED VOMITING TYPE: ICD-10-CM

## 2022-03-25 DIAGNOSIS — O21.0 HYPEREMESIS GRAVIDARUM: ICD-10-CM

## 2022-03-25 LAB
ALBUMIN SERPL-MCNC: 4.3 G/DL (ref 3.4–5)
ALP SERPL-CCNC: 66 U/L (ref 40–150)
ALT SERPL W P-5'-P-CCNC: 24 U/L (ref 0–50)
ANION GAP SERPL CALCULATED.3IONS-SCNC: 8 MMOL/L (ref 3–14)
AST SERPL W P-5'-P-CCNC: 11 U/L (ref 0–45)
ATRIAL RATE - MUSE: 68 BPM
BASOPHILS # BLD AUTO: 0.1 10E3/UL (ref 0–0.2)
BASOPHILS NFR BLD AUTO: 0 %
BILIRUB SERPL-MCNC: 0.7 MG/DL (ref 0.2–1.3)
BUN SERPL-MCNC: 11 MG/DL (ref 7–30)
CALCIUM SERPL-MCNC: 9.2 MG/DL (ref 8.5–10.1)
CHLORIDE BLD-SCNC: 107 MMOL/L (ref 94–109)
CO2 SERPL-SCNC: 23 MMOL/L (ref 20–32)
CREAT SERPL-MCNC: 0.7 MG/DL (ref 0.52–1.04)
DIASTOLIC BLOOD PRESSURE - MUSE: NORMAL MMHG
EOSINOPHIL # BLD AUTO: 0.1 10E3/UL (ref 0–0.7)
EOSINOPHIL NFR BLD AUTO: 1 %
ERYTHROCYTE [DISTWIDTH] IN BLOOD BY AUTOMATED COUNT: 13.1 % (ref 10–15)
FLUAV RNA SPEC QL NAA+PROBE: NEGATIVE
FLUBV RNA RESP QL NAA+PROBE: NEGATIVE
GFR SERPL CREATININE-BSD FRML MDRD: >90 ML/MIN/1.73M2
GLUCOSE BLD-MCNC: 111 MG/DL (ref 70–99)
HCT VFR BLD AUTO: 44.6 % (ref 35–47)
HGB BLD-MCNC: 14.9 G/DL (ref 11.7–15.7)
IMM GRANULOCYTES # BLD: 0.1 10E3/UL
IMM GRANULOCYTES NFR BLD: 0 %
INTERPRETATION ECG - MUSE: NORMAL
LIPASE SERPL-CCNC: 90 U/L (ref 73–393)
LYMPHOCYTES # BLD AUTO: 1 10E3/UL (ref 0.8–5.3)
LYMPHOCYTES NFR BLD AUTO: 8 %
MCH RBC QN AUTO: 31.8 PG (ref 26.5–33)
MCHC RBC AUTO-ENTMCNC: 33.4 G/DL (ref 31.5–36.5)
MCV RBC AUTO: 95 FL (ref 78–100)
MONOCYTES # BLD AUTO: 1.1 10E3/UL (ref 0–1.3)
MONOCYTES NFR BLD AUTO: 8 %
NEUTROPHILS # BLD AUTO: 11.1 10E3/UL (ref 1.6–8.3)
NEUTROPHILS NFR BLD AUTO: 83 %
NRBC # BLD AUTO: 0 10E3/UL
NRBC BLD AUTO-RTO: 0 /100
P AXIS - MUSE: 54 DEGREES
PLATELET # BLD AUTO: 286 10E3/UL (ref 150–450)
POTASSIUM BLD-SCNC: 3.4 MMOL/L (ref 3.4–5.3)
PR INTERVAL - MUSE: 120 MS
PROT SERPL-MCNC: 7.8 G/DL (ref 6.8–8.8)
QRS DURATION - MUSE: 76 MS
QT - MUSE: 380 MS
QTC - MUSE: 404 MS
R AXIS - MUSE: 71 DEGREES
RBC # BLD AUTO: 4.69 10E6/UL (ref 3.8–5.2)
RSV RNA SPEC NAA+PROBE: NEGATIVE
SARS-COV-2 RNA RESP QL NAA+PROBE: NEGATIVE
SODIUM SERPL-SCNC: 138 MMOL/L (ref 133–144)
SYSTOLIC BLOOD PRESSURE - MUSE: NORMAL MMHG
T AXIS - MUSE: 61 DEGREES
VENTRICULAR RATE- MUSE: 68 BPM
WBC # BLD AUTO: 13.4 10E3/UL (ref 4–11)

## 2022-03-25 PROCEDURE — 96374 THER/PROPH/DIAG INJ IV PUSH: CPT

## 2022-03-25 PROCEDURE — 85014 HEMATOCRIT: CPT | Performed by: EMERGENCY MEDICINE

## 2022-03-25 PROCEDURE — 36415 COLL VENOUS BLD VENIPUNCTURE: CPT | Performed by: EMERGENCY MEDICINE

## 2022-03-25 PROCEDURE — 258N000003 HC RX IP 258 OP 636: Performed by: EMERGENCY MEDICINE

## 2022-03-25 PROCEDURE — 250N000013 HC RX MED GY IP 250 OP 250 PS 637: Performed by: EMERGENCY MEDICINE

## 2022-03-25 PROCEDURE — 80053 COMPREHEN METABOLIC PANEL: CPT | Performed by: EMERGENCY MEDICINE

## 2022-03-25 PROCEDURE — 96361 HYDRATE IV INFUSION ADD-ON: CPT

## 2022-03-25 PROCEDURE — 87637 SARSCOV2&INF A&B&RSV AMP PRB: CPT | Performed by: EMERGENCY MEDICINE

## 2022-03-25 PROCEDURE — 93005 ELECTROCARDIOGRAM TRACING: CPT

## 2022-03-25 PROCEDURE — C9803 HOPD COVID-19 SPEC COLLECT: HCPCS

## 2022-03-25 PROCEDURE — 99285 EMERGENCY DEPT VISIT HI MDM: CPT | Mod: 25

## 2022-03-25 PROCEDURE — 250N000011 HC RX IP 250 OP 636: Performed by: EMERGENCY MEDICINE

## 2022-03-25 PROCEDURE — 83690 ASSAY OF LIPASE: CPT | Performed by: EMERGENCY MEDICINE

## 2022-03-25 RX ORDER — ONDANSETRON 4 MG/1
4 TABLET, FILM COATED ORAL EVERY 8 HOURS PRN
Qty: 10 TABLET | Refills: 0 | Status: SHIPPED | OUTPATIENT
Start: 2022-03-25 | End: 2022-03-25

## 2022-03-25 RX ORDER — ONDANSETRON 4 MG/1
4 TABLET, ORALLY DISINTEGRATING ORAL EVERY 8 HOURS PRN
Qty: 10 TABLET | Refills: 0 | Status: SHIPPED | OUTPATIENT
Start: 2022-03-25 | End: 2022-03-28

## 2022-03-25 RX ORDER — ONDANSETRON 2 MG/ML
4 INJECTION INTRAMUSCULAR; INTRAVENOUS ONCE
Status: COMPLETED | OUTPATIENT
Start: 2022-03-25 | End: 2022-03-25

## 2022-03-25 RX ORDER — ACETAMINOPHEN 500 MG
1000 TABLET ORAL ONCE
Status: COMPLETED | OUTPATIENT
Start: 2022-03-25 | End: 2022-03-25

## 2022-03-25 RX ADMIN — ACETAMINOPHEN 1000 MG: 500 TABLET, FILM COATED ORAL at 05:03

## 2022-03-25 RX ADMIN — SODIUM CHLORIDE 1000 ML: 9 INJECTION, SOLUTION INTRAVENOUS at 04:35

## 2022-03-25 RX ADMIN — ONDANSETRON 4 MG: 2 INJECTION INTRAMUSCULAR; INTRAVENOUS at 04:35

## 2022-03-25 ASSESSMENT — ENCOUNTER SYMPTOMS
ABDOMINAL PAIN: 1
VOMITING: 1
NAUSEA: 1
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
## 2022-03-25 NOTE — RESULT ENCOUNTER NOTE
Negative for Influenza A, Influenza B, RSV and Covid19.  Patient will receive the Covid19 result via [employer] and a letter will be sent via [facility] (if active) or via the mail

## 2022-03-25 NOTE — ED TRIAGE NOTES
"Patient is five weeks pregnant, presenting to the ED this morning with complaints of upper abdominal pain, nausea and vomiting that began on Wednesday, had OB appointment yesterday and was told it was a \"GI thing\".   "

## 2022-04-03 ASSESSMENT — ANXIETY QUESTIONNAIRES
2. NOT BEING ABLE TO STOP OR CONTROL WORRYING: SEVERAL DAYS
GAD7 TOTAL SCORE: 8
3. WORRYING TOO MUCH ABOUT DIFFERENT THINGS: SEVERAL DAYS
7. FEELING AFRAID AS IF SOMETHING AWFUL MIGHT HAPPEN: NOT AT ALL
5. BEING SO RESTLESS THAT IT IS HARD TO SIT STILL: SEVERAL DAYS
4. TROUBLE RELAXING: MORE THAN HALF THE DAYS
GAD7 TOTAL SCORE: 8
GAD7 TOTAL SCORE: 8
1. FEELING NERVOUS, ANXIOUS, OR ON EDGE: SEVERAL DAYS
6. BECOMING EASILY ANNOYED OR IRRITABLE: MORE THAN HALF THE DAYS
7. FEELING AFRAID AS IF SOMETHING AWFUL MIGHT HAPPEN: NOT AT ALL

## 2022-04-08 ENCOUNTER — VIRTUAL VISIT (OUTPATIENT)
Dept: BEHAVIORAL HEALTH | Facility: CLINIC | Age: 27
End: 2022-04-08
Payer: COMMERCIAL

## 2022-04-08 VITALS — BODY MASS INDEX: 30.11 KG/M2 | WEIGHT: 170 LBS

## 2022-04-08 DIAGNOSIS — F33.1 MAJOR DEPRESSIVE DISORDER, RECURRENT EPISODE, MODERATE (H): ICD-10-CM

## 2022-04-08 DIAGNOSIS — F90.2 ADHD (ATTENTION DEFICIT HYPERACTIVITY DISORDER), COMBINED TYPE: ICD-10-CM

## 2022-04-08 DIAGNOSIS — F41.1 GENERALIZED ANXIETY DISORDER: Primary | ICD-10-CM

## 2022-04-08 PROCEDURE — G0463 HOSPITAL OUTPT CLINIC VISIT: HCPCS | Mod: TEL,95

## 2022-04-08 PROCEDURE — 99214 OFFICE O/P EST MOD 30 MIN: CPT | Mod: 95 | Performed by: NURSE PRACTITIONER

## 2022-04-08 RX ORDER — ALPRAZOLAM 0.5 MG
0.5 TABLET ORAL PRN
Status: CANCELLED | OUTPATIENT
Start: 2022-04-08

## 2022-04-08 RX ORDER — DEXTROAMPHETAMINE SACCHARATE, AMPHETAMINE ASPARTATE, DEXTROAMPHETAMINE SULFATE AND AMPHETAMINE SULFATE 2.5; 2.5; 2.5; 2.5 MG/1; MG/1; MG/1; MG/1
10 TABLET ORAL DAILY
Qty: 30 TABLET | Refills: 0 | Status: SHIPPED | OUTPATIENT
Start: 2022-04-11 | End: 2022-05-19

## 2022-04-08 RX ORDER — FLUOXETINE 40 MG/1
40 CAPSULE ORAL DAILY
Qty: 30 CAPSULE | Refills: 1 | Status: SHIPPED | OUTPATIENT
Start: 2022-04-08 | End: 2022-05-19

## 2022-04-08 RX ORDER — FLUOXETINE 40 MG/1
40 CAPSULE ORAL DAILY
Qty: 30 CAPSULE | Refills: 1 | Status: CANCELLED | OUTPATIENT
Start: 2022-04-08

## 2022-04-08 RX ORDER — HYDROXYZINE PAMOATE 50 MG/1
50 CAPSULE ORAL 3 TIMES DAILY PRN
Qty: 90 CAPSULE | Refills: 0 | Status: SHIPPED | OUTPATIENT
Start: 2022-04-08 | End: 2022-08-18

## 2022-04-08 RX ORDER — DEXTROAMPHETAMINE SACCHARATE, AMPHETAMINE ASPARTATE MONOHYDRATE, DEXTROAMPHETAMINE SULFATE AND AMPHETAMINE SULFATE 5; 5; 5; 5 MG/1; MG/1; MG/1; MG/1
40 CAPSULE, EXTENDED RELEASE ORAL DAILY
Qty: 80 CAPSULE | Refills: 0 | Status: SHIPPED | OUTPATIENT
Start: 2022-04-11 | End: 2022-05-19

## 2022-04-08 ASSESSMENT — ANXIETY QUESTIONNAIRES
2. NOT BEING ABLE TO STOP OR CONTROL WORRYING: NEARLY EVERY DAY
3. WORRYING TOO MUCH ABOUT DIFFERENT THINGS: NEARLY EVERY DAY
7. FEELING AFRAID AS IF SOMETHING AWFUL MIGHT HAPPEN: MORE THAN HALF THE DAYS
IF YOU CHECKED OFF ANY PROBLEMS ON THIS QUESTIONNAIRE, HOW DIFFICULT HAVE THESE PROBLEMS MADE IT FOR YOU TO DO YOUR WORK, TAKE CARE OF THINGS AT HOME, OR GET ALONG WITH OTHER PEOPLE: SOMEWHAT DIFFICULT
1. FEELING NERVOUS, ANXIOUS, OR ON EDGE: MORE THAN HALF THE DAYS
5. BEING SO RESTLESS THAT IT IS HARD TO SIT STILL: MORE THAN HALF THE DAYS
6. BECOMING EASILY ANNOYED OR IRRITABLE: MORE THAN HALF THE DAYS
GAD7 TOTAL SCORE: 16

## 2022-04-08 ASSESSMENT — PATIENT HEALTH QUESTIONNAIRE - PHQ9
5. POOR APPETITE OR OVEREATING: MORE THAN HALF THE DAYS
SUM OF ALL RESPONSES TO PHQ QUESTIONS 1-9: 20

## 2022-04-08 NOTE — PATIENT INSTRUCTIONS
Continue medications as prescribed  Have your pharmacy contact us for a refill if you are running low on medications (We may ask you to come into clinic to get a refill from the nurse  No Alcohol or drug use  No driving if sedated  Call the clinic with any questions or concerns   Reach out for help if you feel like hurting yourself or others ([facility] [phone]: [address] [facility] [address] [facility] [address] [facility], [address] or [facility] Suicide Hotline   [phone] , call 911 or go to nearest Emergency room     Crisis Resources:    1. Present to the Emergency Department as needed or call after hours crisis line at [phone] or [phone].   2. [place] Crisis Text Line: Text [place] to 329710.  3. Suicide LifeLine Chat: suicidepreventionHOSTEXline.org/chat/.  4. National Suicide Prevention Lifeline: 508.322.7842 (TTY: 664.447.4141). Call anytime for help.  (www.suicidepreventionlifeline.org)  5. National [place] on Mental Illness (www.lita.org): 287.240.1290 or 657-159-6121.  6. Mental Health Association (www.mentalhealth.org): 353.850.1985 or 136-735-9578.       Follow up as directed, for your appointments, per your After Visit Summary Form.
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
## 2022-04-08 NOTE — NURSING NOTE
This video/telephone visit will be conducted via a call between you and your physician/provider. We have found that certain health care needs can be provided without the need for an in-person physical exam. This service lets us provide the care you need with a video /telephone conversation. If a prescription is necessary we can send it directly to your pharmacy. If lab work is needed we can place an order for that and you can then stop by our lab to have the test done at a later time.    Just as we bill insurance for in-person visits, we also bill insurance for video/telephone visits. If you have questions about your insurance coverage, we recommend that you speak with your insurance company.    Patient has given verbal consent for video/Telephone visit? yes    Patient would like telephone visit, please connect : [phone]    Reason for visit: follow up  Patient verified allergies, medications and pharmacy via phone.     ODALIS-7 scores:    ODALIS-7 SCORE 4/3/2022 4/8/2022   Total Score 8 (mild anxiety) -   Total Score 8 16       PHQ-9 scores:   PHQ-9 SCORE 3/11/2022 4/8/2022   PHQ-9 Total Score MyChart 7 (Mild depression) -   PHQ-9 Total Score 7 20       Patient states she  is ready for visit.    Franky Jacobson MA April 8, 2022 8:59 AM    Medications Phoned  to Pharmacy [] yes [x]no     Medications ordered this visit were e-scribed.  Verified by order class [x] yes  [] no  List medications sent to pharmacy:   Adderall 10mg and 20mg  Fluoxetine 40mg  Hydroxyzine 50mg    Medication changes or discontinuations were communicated to patient's pharmacy: [] yes  [x] no     Dictation completed at time of chart check: [x] yes  [] no     I have checked the documentation for today s encounters and the above information has been reviewed and completed.        Franky Jacobson MA April 8, 2022 1:00 PM

## 2022-04-15 PROCEDURE — 88305 TISSUE EXAM BY PATHOLOGIST: CPT | Mod: TC,ORL | Performed by: OBSTETRICS & GYNECOLOGY

## 2022-04-18 ENCOUNTER — LAB REQUISITION (OUTPATIENT)
Dept: LAB | Facility: CLINIC | Age: 27
End: 2022-04-18
Payer: COMMERCIAL

## 2022-04-19 LAB
PATH REPORT.COMMENTS IMP SPEC: NORMAL
PATH REPORT.COMMENTS IMP SPEC: NORMAL
PATH REPORT.FINAL DX SPEC: NORMAL
PATH REPORT.GROSS SPEC: NORMAL
PATH REPORT.MICROSCOPIC SPEC OTHER STN: NORMAL
PATH REPORT.RELEVANT HX SPEC: NORMAL
PHOTO IMAGE: NORMAL

## 2022-04-19 PROCEDURE — 88305 TISSUE EXAM BY PATHOLOGIST: CPT | Mod: 26 | Performed by: PATHOLOGY

## 2022-05-06 ENCOUNTER — TELEPHONE (OUTPATIENT)
Dept: BEHAVIORAL HEALTH | Facility: CLINIC | Age: 27
End: 2022-05-06
Payer: COMMERCIAL

## 2022-05-06 NOTE — TELEPHONE ENCOUNTER
Writer left a vm for patient reminding appt on Tuesday may 10th and to call intake back to complete  VERBAL  General Consent or log into [employer] to complete Consent with appointment at pre-check in.

## 2022-05-10 ENCOUNTER — TELEPHONE (OUTPATIENT)
Dept: PSYCHOLOGY | Facility: CLINIC | Age: 27
End: 2022-05-10
Payer: COMMERCIAL

## 2022-05-10 NOTE — TELEPHONE ENCOUNTER
Reason for call: Patient did not arrive at scheduled appointment.     Outcome: Writer left encouraging voicemail to reschedule appointment and informed due to first initial NP appointment was missed all future appointments would be cancelled.      Follow-up: Await for patient to reschedule appointment.     DEANDRA Lou, LGSW

## 2022-05-19 ENCOUNTER — VIRTUAL VISIT (OUTPATIENT)
Dept: BEHAVIORAL HEALTH | Facility: CLINIC | Age: 27
End: 2022-05-19
Payer: COMMERCIAL

## 2022-05-19 VITALS — BODY MASS INDEX: 30.11 KG/M2 | WEIGHT: 170 LBS

## 2022-05-19 DIAGNOSIS — F90.2 ADHD (ATTENTION DEFICIT HYPERACTIVITY DISORDER), COMBINED TYPE: ICD-10-CM

## 2022-05-19 DIAGNOSIS — F33.1 MAJOR DEPRESSIVE DISORDER, RECURRENT EPISODE, MODERATE (H): ICD-10-CM

## 2022-05-19 PROCEDURE — 99214 OFFICE O/P EST MOD 30 MIN: CPT | Mod: 95 | Performed by: NURSE PRACTITIONER

## 2022-05-19 RX ORDER — DEXTROAMPHETAMINE SACCHARATE, AMPHETAMINE ASPARTATE, DEXTROAMPHETAMINE SULFATE AND AMPHETAMINE SULFATE 2.5; 2.5; 2.5; 2.5 MG/1; MG/1; MG/1; MG/1
10 TABLET ORAL DAILY
Qty: 30 TABLET | Refills: 0 | Status: SHIPPED | OUTPATIENT
Start: 2022-05-19 | End: 2022-06-24

## 2022-05-19 RX ORDER — FLUOXETINE 40 MG/1
40 CAPSULE ORAL DAILY
Qty: 30 CAPSULE | Refills: 1 | Status: SHIPPED | OUTPATIENT
Start: 2022-05-19 | End: 2022-07-29

## 2022-05-19 RX ORDER — DEXTROAMPHETAMINE SACCHARATE, AMPHETAMINE ASPARTATE MONOHYDRATE, DEXTROAMPHETAMINE SULFATE AND AMPHETAMINE SULFATE 5; 5; 5; 5 MG/1; MG/1; MG/1; MG/1
40 CAPSULE, EXTENDED RELEASE ORAL DAILY
Qty: 80 CAPSULE | Refills: 0 | Status: SHIPPED | OUTPATIENT
Start: 2022-05-19 | End: 2022-06-24

## 2022-05-19 ASSESSMENT — ANXIETY QUESTIONNAIRES
GAD7 TOTAL SCORE: 8
6. BECOMING EASILY ANNOYED OR IRRITABLE: MORE THAN HALF THE DAYS
1. FEELING NERVOUS, ANXIOUS, OR ON EDGE: SEVERAL DAYS
IF YOU CHECKED OFF ANY PROBLEMS ON THIS QUESTIONNAIRE, HOW DIFFICULT HAVE THESE PROBLEMS MADE IT FOR YOU TO DO YOUR WORK, TAKE CARE OF THINGS AT HOME, OR GET ALONG WITH OTHER PEOPLE: SOMEWHAT DIFFICULT
7. FEELING AFRAID AS IF SOMETHING AWFUL MIGHT HAPPEN: SEVERAL DAYS
2. NOT BEING ABLE TO STOP OR CONTROL WORRYING: SEVERAL DAYS
GAD7 TOTAL SCORE: 8
3. WORRYING TOO MUCH ABOUT DIFFERENT THINGS: SEVERAL DAYS
5. BEING SO RESTLESS THAT IT IS HARD TO SIT STILL: SEVERAL DAYS

## 2022-05-19 ASSESSMENT — PATIENT HEALTH QUESTIONNAIRE - PHQ9
SUM OF ALL RESPONSES TO PHQ QUESTIONS 1-9: 5
5. POOR APPETITE OR OVEREATING: SEVERAL DAYS

## 2022-05-19 NOTE — NURSING NOTE
This video/telephone visit will be conducted via a call between you and your physician/provider. We have found that certain health care needs can be provided without the need for an in-person physical exam. This service lets us provide the care you need with a video /telephone conversation. If a prescription is necessary we can send it directly to your pharmacy. If lab work is needed we can place an order for that and you can then stop by our lab to have the test done at a later time.    Just as we bill insurance for in-person visits, we also bill insurance for video/telephone visits. If you have questions about your insurance coverage, we recommend that you speak with your insurance company.    Patient has given verbal consent for video/Telephone visit? yes    Patient would like telephone visit, please connect : [phone]    Reason for visit: follow up  Patient verified allergies, medications and pharmacy via phone.     ODALIS-7 scores:    ODALIS-7 SCORE 4/8/2022 5/19/2022   Total Score - -   Total Score 16 8       PHQ-9 scores:   PHQ-9 SCORE 4/8/2022 5/19/2022   PHQ-9 Total Score MyChart - -   PHQ-9 Total Score 20 5       Patient states she  is ready for visit.    Franky Jacobson MA May 19, 2022 1:37 PM      Medications Phoned  to Pharmacy [] yes [x]no     Medications ordered this visit were e-scribed.  Verified by order class [x] yes  [] no  List medications sent to pharmacy:   Adderrall 10mg and 20mg  Fluoxetine 40mg    Medication changes or discontinuations were communicated to patient's pharmacy: [] yes  [x] no     Dictation completed at time of chart check: [x] yes  [] no     I have checked the documentation for today s encounters and the above information has been reviewed and completed.        Franky Jacobson MA May 19, 2022 2:55 PM
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
## 2022-05-19 NOTE — PATIENT INSTRUCTIONS
Continue medications as prescribed  Have your pharmacy contact us for a refill if you are running low on medications (We may ask you to come into clinic to get a refill from the nurse  No Alcohol or drug use  No driving if sedated  Call the clinic with any questions or concerns   Reach out for help if you feel like hurting yourself or others ([facility] [phone]: [address], [address] or [facility] Suicide Hotline   [phone] , call 911 or go to nearest Emergency room     Crisis Resources:    Present to the Emergency Department as needed or call after hours crisis line at [phone] or [phone].   [place] Crisis Text Line: Text [place] to 389101.  Suicide LifeLine Chat: suicidepreventionlifeline.org/chat/.  National Suicide Prevention Lifeline: 569.389.8001 (TTY: 428.284.4821). Call anytime for help.  (www.suicidepreventionlifeline.org)  National [place] on Mental Illness (www.lita.org): 484.411.5756 or 823-622-7345.  Mental Health Association (www.mentalhealth.org): 849.691.3343 or 060-963-8815.       Follow up as directed, for your appointments, per your After Visit Summary Form.

## 2022-06-23 ENCOUNTER — TELEPHONE (OUTPATIENT)
Dept: BEHAVIORAL HEALTH | Facility: CLINIC | Age: 27
End: 2022-06-23

## 2022-06-23 NOTE — TELEPHONE ENCOUNTER
Reason for call:  Medication     If this is a refill request, has the caller requested the refill from the pharmacy already? Yes     Will the patient be using a [facility]? No     Name of the pharmacy and phone number for the current request: [facility] #[id number] - [place], [place] - [address] [phone]    Name of the medication requested:    amphetamine-dextroamphetamine (ADDERALL XR) 20 MG 24 hr capsule       Other request: N/A    Phone number to reach patient:  Cell number on file:    Telephone Information:   Mobile [phone]       Best Time:  any    Can we leave a detailed message on this number?  YES    Travel screening: Not Applicable

## 2022-06-24 DIAGNOSIS — F90.2 ADHD (ATTENTION DEFICIT HYPERACTIVITY DISORDER), COMBINED TYPE: ICD-10-CM

## 2022-06-24 RX ORDER — DEXTROAMPHETAMINE SACCHARATE, AMPHETAMINE ASPARTATE, DEXTROAMPHETAMINE SULFATE AND AMPHETAMINE SULFATE 2.5; 2.5; 2.5; 2.5 MG/1; MG/1; MG/1; MG/1
10 TABLET ORAL DAILY
Qty: 30 TABLET | Refills: 0 | Status: SHIPPED | OUTPATIENT
Start: 2022-06-24 | End: 2022-07-29

## 2022-06-24 RX ORDER — DEXTROAMPHETAMINE SACCHARATE, AMPHETAMINE ASPARTATE MONOHYDRATE, DEXTROAMPHETAMINE SULFATE AND AMPHETAMINE SULFATE 5; 5; 5; 5 MG/1; MG/1; MG/1; MG/1
40 CAPSULE, EXTENDED RELEASE ORAL DAILY
Qty: 60 CAPSULE | Refills: 0 | Status: SHIPPED | OUTPATIENT
Start: 2022-06-24 | End: 2022-07-29

## 2022-06-24 NOTE — TELEPHONE ENCOUNTER
05/19/2022 05/19/2022 05/24/2022 2   Adderall Xr 20 Mg Capsule  60.00 30 Em [place] [id number] Gra ([id number]) 0/0  Medicaid [place]  05/19/2022 05/19/2022 05/24/2022 2   Dextroamp-Amphetamin 10 Mg Tab  30.00 30 Em [place] [id number] Gra ([id number]) 0/0  [place]icaid [place]  04/15/2022 04/15/2022 04/15/2022 2   Oxycodone Hcl (Ir) 5 Mg Tablet  15.00 3 Hannah Crystal [id number] Gra ([id number]) 0/0 37.50 MME Medicaid [place]  04/02/2022 03/13/2022 04/08/2022 2   Dextroamp-Amphetamin 10 Mg Tab  30.00 30 Aditya Leon [id number] Gra ([id number]) 0/0  [place]icaid [place]  04/02/2022 03/13/2022 04/08/2022 2   Adderall Xr 20 Mg Capsule  30.00 30 Aditya Leon [id number] Gra ([id number]) 0/0  [place]icaid [place]  03/11/2022 03/11/2022 03/17/2022 1   Adderall Xr 20 Mg Capsule  60.00 30 Em [place] [id number] [facility] ([id number]) 0/0  [place]icaid [place]

## 2022-06-24 NOTE — TELEPHONE ENCOUNTER
Date of Last Office Visit: 5/17/22  Date of Next Office Visit: 7/28/22  No shows since last visit: none  Cancellations since last visit: none    Medication requested: amphetamine-dextroamphetamine (ADDERALL) 20mg xr, 10mg Date last ordered: 5/16/22 Qty: 30 Refills: 0     Review of [place] ?: Not a delegate for this provider, awaiting approval   Lapse in medication adherence greater than 5 days?: no  If yes, call patient and gather details:    Medication refill request verified as identical to current order?: yes  Result of Last DAM, VPA, Li+ Level, CBC, or Carbamazepine Level (at or since last visit): N/A    Last visit treatment plan:   PLAN AND RECOMMENDATIONS:  1. Increase Prozac 20 mg to 40 mg once daily  2. Start taking hydroxyzine 50 mg three times daily as needed for Anxiety and sleep   3. Continue taking Adderall XL 40 mg in the morning   4. Continue taking Adderall 10 mg at noon   5. Keep your appointments with OB  6. Follow up again in 4 weeks  for medication review and evaluation.    []Medication refilled per  Medication Refill in Ambulatory Care  policy.  [x]Medication unable to be refilled by RN due to criteria not met as indicated below:    []Eligibility - not seen in the last year   []Supervision - no future appointment   []Compliance - no shows, cancellations or lapse in therapy   []Verification - order discrepancy   [x]Controlled medication   []Medication not included in policy   []90-day supply request   []Other

## 2022-07-29 DIAGNOSIS — F90.2 ADHD (ATTENTION DEFICIT HYPERACTIVITY DISORDER), COMBINED TYPE: ICD-10-CM

## 2022-07-29 DIAGNOSIS — F33.1 MAJOR DEPRESSIVE DISORDER, RECURRENT EPISODE, MODERATE (H): ICD-10-CM

## 2022-07-29 RX ORDER — FLUOXETINE 40 MG/1
40 CAPSULE ORAL DAILY
Qty: 30 CAPSULE | Refills: 0 | Status: SHIPPED | OUTPATIENT
Start: 2022-07-29 | End: 2022-09-12

## 2022-07-29 RX ORDER — DEXTROAMPHETAMINE SACCHARATE, AMPHETAMINE ASPARTATE, DEXTROAMPHETAMINE SULFATE AND AMPHETAMINE SULFATE 2.5; 2.5; 2.5; 2.5 MG/1; MG/1; MG/1; MG/1
10 TABLET ORAL DAILY
Qty: 30 TABLET | Refills: 0 | Status: SHIPPED | OUTPATIENT
Start: 2022-07-29 | End: 2022-08-18 | Stop reason: DRUGHIGH

## 2022-07-29 RX ORDER — DEXTROAMPHETAMINE SACCHARATE, AMPHETAMINE ASPARTATE MONOHYDRATE, DEXTROAMPHETAMINE SULFATE AND AMPHETAMINE SULFATE 5; 5; 5; 5 MG/1; MG/1; MG/1; MG/1
40 CAPSULE, EXTENDED RELEASE ORAL DAILY
Qty: 60 CAPSULE | Refills: 0 | Status: SHIPPED | OUTPATIENT
Start: 2022-07-29 | End: 2022-11-27

## 2022-07-29 NOTE — TELEPHONE ENCOUNTER
Date of Last Office Visit: 5/19/22  Date of Next Office Visit: 8/18/22  No shows since last visit: none  Cancellations since last visit: 7/29/22 (provider)    Medication requested: Fluoxetine 40 mg  Date last ordered: 5/19/22 Qty: 30 Refills: 1     Medication requested: amphetamine-dextroamphetamine 10mg Date last ordered: 6/24/22 Qty: 30 Refills: 0    Medication requested: amphetamine-dextroamphetamine 20 mg 24 hr cap  Date last ordered: 6/24/22 Qty: 60 Refills: 0    [place] of [place] ?: yes  Medication last filled date: 6/27/22 Qty filled: 20 mg 60 cap, 10 mg 30 cap  Other controlled substance on [place] ?: yes, Oxycodone 4/15, Hydrocodone 3/8, Alprazolam 2/15  If yes, is this a new medication?: unknown surgery  If yes, name of medication: Oxycodone 4/15, Hydrocodone 3/8, Alprazolam 2/15     Lapse in medication adherence greater than 5 days?: no  If yes, call patient and gather details: NA  Medication refill request verified as identical to current order?: yes  Result of Last DAM, VPA, Li+ Level, CBC, or Carbamazepine Level (at or since last visit): N/A    Last visit treatment plan:   FOLLOWING PLAN PUT INTO PLACE:   Patient reports she is going through a miscarriage. She found out a few weeks ago that she was pregnant. On 3/11 visit she reported no pregnancy. She was seen at the emergency room and found out she was 8 weeks pregnant. She was using a gel birth control  which she reports is 85% effective. She is reporting her periods are irregular. She is not planning to have another baby at this time. She reports history of miscarriage in the past.  She reports ultra sound showed no baby. She has appointments with her OB on Monday and Tuesday to run more tests to confirm the pregnancy or the miscarriage. She is not planning to keep the pregnancy. We discussed the risk to the mother of being off the medication, and the effects of her being unwell on the fetus, versus the risk of medication to the fetus. Patient reports  she has been the Prozac 20 mg and did not get a chance to try the 40 mg. She reports positive effect on the 40 mg of Adderall. She reports better focus and has been productive at work. Today she is reporting increased anxiety and hard to fall a sleep. She reports Xanax works good as she has been using it in the past. We discussed the risks of benzodiazepine (Ativan, Xanax, Klonopin, Valium, etc) use including, but not limited to, sedation, tolerance, risk for addiction/dependence. Therapy is encouraged. Patient reports she hasn't had time to restart. She denies negative thoughts and reports feeling safe at home. Mom and boyfriend are her support system.      Patient and I reviewed diagnosis and treatment plan and patient agrees with following recommendations:     PLAN AND RECOMMENDATIONS:  1. Increase Prozac 20 mg to 40 mg once daily  2. Start taking hydroxyzine 50 mg three times daily as needed for Anxiety and sleep   3. Continue taking Adderall XL 40 mg in the morning   4. Continue taking Adderall 10 mg at noon   5. Keep your appointments with OB  6. Follow up again in 4 weeks  for medication review and evaluation.    []Medication refilled per  Medication Refill in Ambulatory Care  policy.  [x]Medication unable to be refilled by RN due to criteria not met as indicated below:    []Eligibility - not seen in the last year   []Supervision - no future appointment   []Compliance - no shows, cancellations or lapse in therapy   []Verification - order discrepancy   [x]Controlled medication   []Medication not included in policy   []90-day supply request   []Other

## 2022-07-29 NOTE — TELEPHONE ENCOUNTER
Reason for Call:  Medication refill:    pharmacy and phone number for the current request:  [facility]    Name of the medication requested: Adderall and Fluoxetine    Can we leave a detailed message on this number? Yes    Phone number patient can be reached at: [phone]    Best Time: ASAP    Call taken on 7/29/2022 at 7:30 AM by Janusz Kay

## 2022-08-17 ASSESSMENT — ANXIETY QUESTIONNAIRES
GAD7 TOTAL SCORE: 9
3. WORRYING TOO MUCH ABOUT DIFFERENT THINGS: SEVERAL DAYS
IF YOU CHECKED OFF ANY PROBLEMS ON THIS QUESTIONNAIRE, HOW DIFFICULT HAVE THESE PROBLEMS MADE IT FOR YOU TO DO YOUR WORK, TAKE CARE OF THINGS AT HOME, OR GET ALONG WITH OTHER PEOPLE: SOMEWHAT DIFFICULT
7. FEELING AFRAID AS IF SOMETHING AWFUL MIGHT HAPPEN: SEVERAL DAYS
GAD7 TOTAL SCORE: 9
4. TROUBLE RELAXING: SEVERAL DAYS
2. NOT BEING ABLE TO STOP OR CONTROL WORRYING: SEVERAL DAYS
8. IF YOU CHECKED OFF ANY PROBLEMS, HOW DIFFICULT HAVE THESE MADE IT FOR YOU TO DO YOUR WORK, TAKE CARE OF THINGS AT HOME, OR GET ALONG WITH OTHER PEOPLE?: SOMEWHAT DIFFICULT
7. FEELING AFRAID AS IF SOMETHING AWFUL MIGHT HAPPEN: SEVERAL DAYS
GAD7 TOTAL SCORE: 9
6. BECOMING EASILY ANNOYED OR IRRITABLE: MORE THAN HALF THE DAYS
1. FEELING NERVOUS, ANXIOUS, OR ON EDGE: SEVERAL DAYS
5. BEING SO RESTLESS THAT IT IS HARD TO SIT STILL: MORE THAN HALF THE DAYS

## 2022-08-17 ASSESSMENT — PATIENT HEALTH QUESTIONNAIRE - PHQ9
10. IF YOU CHECKED OFF ANY PROBLEMS, HOW DIFFICULT HAVE THESE PROBLEMS MADE IT FOR YOU TO DO YOUR WORK, TAKE CARE OF THINGS AT HOME, OR GET ALONG WITH OTHER PEOPLE: SOMEWHAT DIFFICULT
SUM OF ALL RESPONSES TO PHQ QUESTIONS 1-9: 5
SUM OF ALL RESPONSES TO PHQ QUESTIONS 1-9: 5

## 2022-08-18 ENCOUNTER — VIRTUAL VISIT (OUTPATIENT)
Dept: PSYCHIATRY | Facility: CLINIC | Age: 27
End: 2022-08-18
Payer: COMMERCIAL

## 2022-08-18 DIAGNOSIS — F33.1 MAJOR DEPRESSIVE DISORDER, RECURRENT EPISODE, MODERATE (H): ICD-10-CM

## 2022-08-18 DIAGNOSIS — F90.2 ADHD (ATTENTION DEFICIT HYPERACTIVITY DISORDER), COMBINED TYPE: Primary | ICD-10-CM

## 2022-08-18 PROCEDURE — 99214 OFFICE O/P EST MOD 30 MIN: CPT | Mod: 95 | Performed by: NURSE PRACTITIONER

## 2022-08-18 RX ORDER — DEXTROAMPHETAMINE SACCHARATE, AMPHETAMINE ASPARTATE, DEXTROAMPHETAMINE SULFATE AND AMPHETAMINE SULFATE 2.5; 2.5; 2.5; 2.5 MG/1; MG/1; MG/1; MG/1
10 TABLET ORAL 2 TIMES DAILY
Qty: 60 TABLET | Refills: 0 | Status: SHIPPED | OUTPATIENT
Start: 2022-08-18 | End: 2022-11-15
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
## 2022-08-18 NOTE — PROGRESS NOTES
This video/telephone visit will be conducted via a call between you and your physician/provider. We have found that certain health care needs can be provided without the need for an in-person physical exam. This service lets us provide the care you need with a video /telephone conversation. If a prescription is necessary we can send it directly to your pharmacy. If lab work is needed we can place an order for that and you can then stop by our lab to have the test done at a later time.    Just as we bill insurance for in-person visits, we also bill insurance for video/telephone visits. If you have questions about your insurance coverage, we recommend that you speak with your insurance company.    Patient has given verbal consent for video/Telephone visit? Yes    Patient would like a video visit, please connect/call : Anshul, if unable to connect call [phone]  Reason for visit:   Anything the provider should be aware of for today's appointment: No    Patient verified allergies, medications and pharmacy via telephone.     ODALIS-7 scores:    ODALIS-7 SCORE 5/19/2022 8/17/2022   Total Score - 9 (mild anxiety)   Total Score 8 9       PHQ-9 scores:   PHQ-9 SCORE 5/19/2022 8/17/2022   PHQ-9 Total Score MyChart - 5 (Mild depression)   PHQ-9 Total Score 5 5       Patient states they are ready for visit.    Christie Hall CMA August 18, 2022 1:26 PM    Answers for HPI/ROS submitted by the patient on 8/17/2022  If you checked off any problems, how difficult have these problems made it for you to do your work, take care of things at home, or get along with other people?: Somewhat difficult  PHQ9 TOTAL SCORE: 5  ODALIS 7 TOTAL SCORE: 9

## 2022-08-18 NOTE — PATIENT INSTRUCTIONS
Continue medications as prescribed  Have your pharmacy contact us for a refill if you are running low on medications (We may ask you to come into clinic to get a refill from the nurse  No Alcohol or drug use  No driving if sedated  Call the clinic with any questions or concerns   Reach out for help if you feel like hurting yourself or others ([facility][1] Urgent Care [phone]: [address] [facility], [address] or [facility] Suicide Hotline   [phone] , call 911 or go to nearest Emergency room     Crisis Resources:    Present to the Emergency Department as needed or call after hours crisis line at [phone] or [phone].   [place] Crisis Text Line: Text [place] to 432465.  Suicide LifeLine Chat: suicidepreventionlifeline.org/chat/.  National Suicide Prevention Lifeline: 435.377.7575 (TTY: 944.931.8585). Call anytime for help.  (www.suicidepreventionlifeline.org)  National [place] on Mental Illness (www.lita.org): 952.935.2734 or 176-416-6548.  Mental Health Association (www.mentalhealth.org): 961.741.1438 or 927-206-9806.       Follow up as directed, for your appointments, per your After Visit Summary Form.

## 2022-09-11 DIAGNOSIS — F90.2 ADHD (ATTENTION DEFICIT HYPERACTIVITY DISORDER), COMBINED TYPE: ICD-10-CM

## 2022-09-11 DIAGNOSIS — F33.1 MAJOR DEPRESSIVE DISORDER, RECURRENT EPISODE, MODERATE (H): ICD-10-CM

## 2022-09-12 RX ORDER — FLUOXETINE 40 MG/1
40 CAPSULE ORAL DAILY
Qty: 30 CAPSULE | Refills: 0 | Status: SHIPPED | OUTPATIENT
Start: 2022-09-12 | End: 2022-09-28

## 2022-09-12 NOTE — TELEPHONE ENCOUNTER
"1) RN spoke with patient to confirm Prozac Dose: Patient stated she is taking Prozac 20 mg as well as the 40 mg.   Prozac 20 mg states \"Take with 40 mg for a total of 60 mg\"    2) Patient requesting refill on the following medications:   She stated her pharmacy is \"bad\" and that she recieve the wrong refill of her Adderall 10 mg. Patient reports Jessenia did the order wrong and was supposed to do the Adderall XR 20 mg but only did the Adderall 10 mg.     3) RN spoke with pharmacy staff \"Tom\" who stated last :    Adderall XR 20 mg: Picked up: 7/29/22 QTY: 60 capsules (30 day supply)    Adderall 10 mg: Picked up: 9/2/22 QTY: 60 capsules (30 day supply)    Tom stated she has two scripts of both Adderall 20 mg and 10 mg on file that \"are on hold\"     Her last Adderall scripts that were filled were from 4/2022     RN confirmed Prozac 20 mg refill on file at pharmacy    4) RN spoke with patient to confirm doses of Adderall 20 mg and Prozaz 20 mg are at the pharmacy. She verbalized acknowledgement.     --------------------------------------------------------------------------------------------------------    Date of Last Office Visit: 8/18/22  Date of Next Office Visit: None - routing to schedule   No shows since last visit: 0  Cancellations since last visit: 0      Medication requested: FLUoxetine (PROZAC) 40 MG capsule Date last ordered: 7/29/22 Qty: 30 Refills: 0        Review of [place] ?: UNABLE TO VERIFY - PENDING PROVIDER APPROVAL  Medication last filled date: - Qty filled: -  Other controlled substance on [place] ?: -  If yes, is this a new medication?: -  If yes, name of medication: - and date filled: -    Lapse in medication adherence greater than 5 days?: No  Medication refill request verified as identical to current order?: yes  Result of Last DAM, VPA, Li+ Level, CBC, or Carbamazepine Level (at or since last visit): N/A    Last visit treatment plan: PLAN AND RECOMMENDATIONS:  Continue taking  Prozac 40 " mg once daily  Continue taking hydroxyzine 50 mg 3 times a day as needed for anxiety and sleep  Continue taking Adderall XL 40 mg in the morning  Continue taking Adderall 10 mg at noon  Follow-up again in 3 months  for medication review and evaluation    []Medication refilled per  Medication Refill in Ambulatory Care  policy.  [x]Medication unable to be refilled by RN due to criteria not met as indicated below:    []Eligibility - not seen in the last year   []Supervision - no future appointment   []Compliance - no shows, cancellations or lapse in therapy   []Verification - order discrepancy   []Controlled medication   [x]Medication not included in policy   []90-day supply request   []Other

## 2022-09-28 ENCOUNTER — VIRTUAL VISIT (OUTPATIENT)
Dept: PSYCHIATRY | Facility: CLINIC | Age: 27
End: 2022-09-28
Payer: COMMERCIAL

## 2022-09-28 DIAGNOSIS — F41.9 ANXIETY: Primary | ICD-10-CM

## 2022-09-28 DIAGNOSIS — F90.2 ADHD (ATTENTION DEFICIT HYPERACTIVITY DISORDER), COMBINED TYPE: ICD-10-CM

## 2022-09-28 DIAGNOSIS — F33.1 MAJOR DEPRESSIVE DISORDER, RECURRENT EPISODE, MODERATE (H): ICD-10-CM

## 2022-09-28 PROCEDURE — 99214 OFFICE O/P EST MOD 30 MIN: CPT | Mod: 95 | Performed by: NURSE PRACTITIONER

## 2022-09-28 RX ORDER — DEXTROAMPHETAMINE SACCHARATE, AMPHETAMINE ASPARTATE, DEXTROAMPHETAMINE SULFATE AND AMPHETAMINE SULFATE 2.5; 2.5; 2.5; 2.5 MG/1; MG/1; MG/1; MG/1
10 TABLET ORAL 2 TIMES DAILY
Qty: 60 TABLET | Refills: 0 | Status: CANCELLED | OUTPATIENT
Start: 2022-09-28

## 2022-09-28 RX ORDER — FLUOXETINE 40 MG/1
40 CAPSULE ORAL DAILY
Qty: 30 CAPSULE | Refills: 0 | Status: SHIPPED | OUTPATIENT
Start: 2022-09-28 | End: 2022-11-04

## 2022-09-28 RX ORDER — ALPRAZOLAM 0.5 MG
0.5 TABLET ORAL DAILY PRN
Qty: 6 TABLET | Refills: 0 | Status: SHIPPED | OUTPATIENT
Start: 2022-09-28

## 2022-09-28 RX ORDER — FLUOXETINE 40 MG/1
40 CAPSULE ORAL DAILY
Qty: 30 CAPSULE | Refills: 0 | Status: CANCELLED | OUTPATIENT
Start: 2022-09-28

## 2022-09-28 ASSESSMENT — ANXIETY QUESTIONNAIRES
7. FEELING AFRAID AS IF SOMETHING AWFUL MIGHT HAPPEN: NOT AT ALL
8. IF YOU CHECKED OFF ANY PROBLEMS, HOW DIFFICULT HAVE THESE MADE IT FOR YOU TO DO YOUR WORK, TAKE CARE OF THINGS AT HOME, OR GET ALONG WITH OTHER PEOPLE?: SOMEWHAT DIFFICULT
GAD7 TOTAL SCORE: 6
4. TROUBLE RELAXING: SEVERAL DAYS
5. BEING SO RESTLESS THAT IT IS HARD TO SIT STILL: SEVERAL DAYS
6. BECOMING EASILY ANNOYED OR IRRITABLE: SEVERAL DAYS
2. NOT BEING ABLE TO STOP OR CONTROL WORRYING: SEVERAL DAYS
GAD7 TOTAL SCORE: 6
IF YOU CHECKED OFF ANY PROBLEMS ON THIS QUESTIONNAIRE, HOW DIFFICULT HAVE THESE PROBLEMS MADE IT FOR YOU TO DO YOUR WORK, TAKE CARE OF THINGS AT HOME, OR GET ALONG WITH OTHER PEOPLE: SOMEWHAT DIFFICULT
3. WORRYING TOO MUCH ABOUT DIFFERENT THINGS: SEVERAL DAYS
7. FEELING AFRAID AS IF SOMETHING AWFUL MIGHT HAPPEN: NOT AT ALL
1. FEELING NERVOUS, ANXIOUS, OR ON EDGE: SEVERAL DAYS
GAD7 TOTAL SCORE: 6

## 2022-09-28 ASSESSMENT — PATIENT HEALTH QUESTIONNAIRE - PHQ9
SUM OF ALL RESPONSES TO PHQ QUESTIONS 1-9: 3
10. IF YOU CHECKED OFF ANY PROBLEMS, HOW DIFFICULT HAVE THESE PROBLEMS MADE IT FOR YOU TO DO YOUR WORK, TAKE CARE OF THINGS AT HOME, OR GET ALONG WITH OTHER PEOPLE: SOMEWHAT DIFFICULT
SUM OF ALL RESPONSES TO PHQ QUESTIONS 1-9: 3
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
## 2022-09-28 NOTE — PATIENT INSTRUCTIONS
Continue medications as prescribed  Have your pharmacy contact us for a refill if you are running low on medications (We may ask you to come into clinic to get a refill from the nurse  No Alcohol or drug use  No driving if sedated  Call the clinic with any questions or concerns   Reach out for help if you feel like hurting yourself or others ([facility] [phone]: [address] [facility], [address] or [facility] Suicide Hotline   [phone] , call 911 or go to nearest Emergency room     Crisis Resources:    Present to the Emergency Department as needed or call after hours crisis line at [phone] or [phone].   [place] Crisis Text Line: Text [place] to 640691.  Suicide LifeLine Chat: suicidepreventionlifeline.org/chat/.  National Suicide Prevention Lifeline: 467.830.1438 (TTY: 343.682.6653). Call anytime for help.  (www.suicidepreventionlifeline.org)  National [place] on Mental Illness (www.lita.org): 992.571.7383 or 201-527-5708.  Mental Health Association (www.mentalhealth.org): 251.533.1258 or 165-538-0130.       Follow up as directed, for your appointments, per your After Visit Summary Form.

## 2022-09-28 NOTE — NURSING NOTE
This video/telephone visit will be conducted via a call between you and your physician/provider. We have found that certain health care needs can be provided without the need for an in-person physical exam. This service lets us provide the care you need with a video /telephone conversation. If a prescription is necessary we can send it directly to your pharmacy. If lab work is needed we can place an order for that and you can then stop by our lab to have the test done at a later time.    Just as we bill insurance for in-person visits, we also bill insurance for video/telephone visits. If you have questions about your insurance coverage, we recommend that you speak with your insurance company.    Patient has given verbal consent for video/Telephone visit? yes    Patient would like a video visit, please connect/call : thony  Reason for visit: follow up  Anything the provider should be aware of for today's appointment: Pt states there is a death in the family and has to go out of town for a while. Requesting refills be sent to a pharmacy in [facility] pended for provider      Patient verified allergies, medications and pharmacy via phone      ODALIS-7 scores:    ODALIS-7 SCORE 8/17/2022 9/28/2022   Total Score 9 (mild anxiety) 6 (mild anxiety)   Total Score 9 6       PHQ-9 scores:   PHQ-9 SCORE 8/17/2022 9/28/2022   PHQ-9 Total Score Juliaharcyndi 5 (Mild depression) 3 (Minimal depression)   PHQ-9 Total Score 5 3       Patient states they are ready for visit.    Franky Jacobson MA September 28, 2022 9:12 AM

## 2022-11-04 DIAGNOSIS — F33.1 MAJOR DEPRESSIVE DISORDER, RECURRENT EPISODE, MODERATE (H): ICD-10-CM

## 2022-11-04 RX ORDER — FLUOXETINE 40 MG/1
CAPSULE ORAL
Qty: 30 CAPSULE | Refills: 3 | Status: SHIPPED | OUTPATIENT
Start: 2022-11-04 | End: 2023-03-30

## 2022-11-04 NOTE — TELEPHONE ENCOUNTER
Behavioral Access, please schedule this patient for a future visit with  Onsina . Patient is requesting a refill.     Date of Last Office Visit: 9/28/22  Date of Next Office Visit: None. Scheduling attempting to contact pt  No shows since last visit: 0  Cancellations since last visit: 0    Medication requested: FLUoxetine (PROZAC) 20 MG capsule Date last ordered: 9/28/22 Qty: 30 days Refills: 1  Medication requested:FLUoxetine (PROZAC) 40 MG capsule  Date last ordered: 9/28/22 Qty: 30 days Refills: 0     [place] of [place] ?: NA      Lapse in medication adherence greater than 5 days?: possible Prozac 40 mg  If yes, call patient and gather details: NA  Medication refill request verified as identical to current order?: yes  Result of Last DAM, VPA, Li+ Level, CBC, or Carbamazepine Level (at or since last visit): N/A    Last visit treatment plan:        PLAN AND RECOMMENDATIONS:  Increase  Prozac 40 mg to 60 mg once daily-sent 20 mg to the pharmacy  Continue taking hydroxyzine 50 mg 3 times a day as needed for anxiety and sleep  Continue Adderall XL 40 mg in the morning in addition to Adderall 10 mg immediate release  Continue taking Adderall 10 mg at noon  Follow-up again 4 for medication review and evaluation       []Medication refilled per  Medication Refill in Ambulatory Care  policy.  [x]Medication unable to be refilled by RN due to criteria not met as indicated below:    []Eligibility - not seen in the last year   [x]Supervision - no future appointment   [x]Compliance - no shows, cancellations or lapse in therapy   []Verification - order discrepancy   []Controlled medication   [x]Medication not included in policy   []90-day supply request   [x]Other: LPN is processing request

## 2022-11-15 ENCOUNTER — MYC REFILL (OUTPATIENT)
Dept: PSYCHIATRY | Facility: CLINIC | Age: 27
End: 2022-11-15

## 2022-11-15 DIAGNOSIS — F90.2 ADHD (ATTENTION DEFICIT HYPERACTIVITY DISORDER), COMBINED TYPE: ICD-10-CM

## 2022-11-16 NOTE — TELEPHONE ENCOUNTER
Date of Last Office Visit: 9/28/22  Date of Next Office Visit: None. Scheduling attempting to contact pt  No shows since last visit: 0  Cancellations since last visit: 0     Medication requested:amphetamine-dextroamphetamine (ADDERALL) 10 MG tablet Date last ordered: 8/18/22 Qty: 60 Refills:0     [place] of [place] ?: Provider has not assigned delegates        Lapse in medication adherence greater than 5 days?:Yes  If yes, call patient and gather details: Sent MyC message  Medication refill request verified as identical to current order?: yes  Result of Last DAM, VPA, Li+ Level, CBC, or Carbamazepine Level (at or since last visit): N/A     Last visit treatment plan:         PLAN AND RECOMMENDATIONS:  Increase  Prozac 40 mg to 60 mg once daily-sent 20 mg to the pharmacy  Continue taking hydroxyzine 50 mg 3 times a day as needed for anxiety and sleep  Continue Adderall XL 40 mg in the morning in addition to Adderall 10 mg immediate release  Continue taking Adderall 10 mg at noon  Follow-up again 4 for medication review and evaluation        []?Medication refilled per  Medication Refill in Ambulatory Care  policy.  [x]?Medication unable to be refilled by RN due to criteria not met as indicated below:               []?Eligibility - not seen in the last year              [x]?Supervision - no future appointment              [x]?Compliance - no shows, cancellations or lapse in therapy              []?Verification - order discrepancy              []?Controlled medication              [x]?Medication not included in policy              []?90-day supply request              [x]?Other: LPN is processing request

## 2022-11-17 RX ORDER — DEXTROAMPHETAMINE SACCHARATE, AMPHETAMINE ASPARTATE, DEXTROAMPHETAMINE SULFATE AND AMPHETAMINE SULFATE 2.5; 2.5; 2.5; 2.5 MG/1; MG/1; MG/1; MG/1
10 TABLET ORAL 2 TIMES DAILY
Qty: 60 TABLET | Refills: 0 | Status: SHIPPED | OUTPATIENT
Start: 2022-11-17 | End: 2023-01-17

## 2022-11-27 ENCOUNTER — MYC REFILL (OUTPATIENT)
Dept: PSYCHIATRY | Facility: CLINIC | Age: 27
End: 2022-11-27

## 2022-11-27 DIAGNOSIS — F90.2 ADHD (ATTENTION DEFICIT HYPERACTIVITY DISORDER), COMBINED TYPE: ICD-10-CM

## 2022-11-28 NOTE — TELEPHONE ENCOUNTER
Date of Last Office Visit: 5/19/22  Date of Next Office Visit: none scheduled  No shows since last visit: 0  Cancellations since last visit: 0    Medication requested: Adderall 20 mg 24 hr cap Date last ordered: 7/29/22 Qty: 60 Refills: 0     [place] of [place] ?: access not granted      Lapse in medication adherence greater than 5 days?: yes  If yes, call patient and gather details: patient called, states she has been out of the 20 mg for past 30 days, taking the 10 mg daily, asking if it can be filled today, pt directed to contact [facility] ([place]) for scheduling assistance for new provider  Medication refill request verified as identical to current order?: yes  Result of Last DAM, VPA, Li+ Level, CBC, or Carbamazepine Level (at or since last visit): N/A    Last visit treatment plan:   ASSESSMENT AND PLAN    Patient reports doing okay after her miscarriage. She reports she is on birth control now and she will not worry of getting pregnant.  She reports taking medication as prescribed without notable side effects.  She feels medication is working okay.  She reports feeling happy as the weather is nice outside.  She believes she has seasonal affective disorder.  She reports sleep is good and appetite is good as well.  She denies any symptoms of depression or anxiety.  She reports her focus is better with medications.  She reports situational problems but they are manageable.  She keeps herself busy in the house and going to the gym. She denies negative thoughts.  She feels she has enough support in place.  No concerns at this time.      Patient and I reviewed diagnosis and treatment plan and patient agrees with following recommendations:     PLAN AND RECOMMENDATIONS:  Continue taking  Prozac 40 mg once daily  Continue taking hydroxyzine 50 mg 3 times a day as needed for anxiety and sleep  Continue taking Adderall XL 40 mg in the morning  Continue taking Adderall 10 mg at noon  Follow-up again in 3 months  for medication review and  evaluation        []Medication refilled per  Medication Refill in Ambulatory Care  policy.  [x]Medication unable to be refilled by RN due to criteria not met as indicated below:    []Eligibility - not seen in the last year   []Supervision - no future appointment   []Compliance - no shows, cancellations or lapse in therapy   []Verification - order discrepancy   [x]Controlled medication   []Medication not included in policy   []90-day supply request   []Other

## 2022-11-29 RX ORDER — DEXTROAMPHETAMINE SACCHARATE, AMPHETAMINE ASPARTATE MONOHYDRATE, DEXTROAMPHETAMINE SULFATE AND AMPHETAMINE SULFATE 5; 5; 5; 5 MG/1; MG/1; MG/1; MG/1
40 CAPSULE, EXTENDED RELEASE ORAL DAILY
Qty: 60 CAPSULE | Refills: 0 | Status: SHIPPED | OUTPATIENT
Start: 2022-11-29 | End: 2023-01-17

## 2023-01-14 ENCOUNTER — HEALTH MAINTENANCE LETTER (OUTPATIENT)
Age: 28
End: 2023-01-14

## 2023-01-17 ENCOUNTER — VIRTUAL VISIT (OUTPATIENT)
Dept: PSYCHIATRY | Facility: CLINIC | Age: 28
End: 2023-01-17
Payer: COMMERCIAL

## 2023-01-17 DIAGNOSIS — F90.2 ADHD (ATTENTION DEFICIT HYPERACTIVITY DISORDER), COMBINED TYPE: ICD-10-CM

## 2023-01-17 DIAGNOSIS — F90.2 ATTENTION DEFICIT HYPERACTIVITY DISORDER (ADHD), COMBINED TYPE: Primary | ICD-10-CM

## 2023-01-17 DIAGNOSIS — F41.1 GAD (GENERALIZED ANXIETY DISORDER): ICD-10-CM

## 2023-01-17 PROCEDURE — 99205 OFFICE O/P NEW HI 60 MIN: CPT | Mod: 95 | Performed by: NURSE PRACTITIONER

## 2023-01-17 RX ORDER — DEXTROAMPHETAMINE SACCHARATE, AMPHETAMINE ASPARTATE MONOHYDRATE, DEXTROAMPHETAMINE SULFATE AND AMPHETAMINE SULFATE 5; 5; 5; 5 MG/1; MG/1; MG/1; MG/1
40 CAPSULE, EXTENDED RELEASE ORAL DAILY
Qty: 60 CAPSULE | Refills: 0 | Status: SHIPPED | OUTPATIENT
Start: 2023-01-17 | End: 2023-04-03

## 2023-01-17 RX ORDER — DEXTROAMPHETAMINE SACCHARATE, AMPHETAMINE ASPARTATE, DEXTROAMPHETAMINE SULFATE AND AMPHETAMINE SULFATE 2.5; 2.5; 2.5; 2.5 MG/1; MG/1; MG/1; MG/1
10 TABLET ORAL 2 TIMES DAILY
Qty: 60 TABLET | Refills: 0 | Status: SHIPPED | OUTPATIENT
Start: 2023-01-17 | End: 2023-04-03

## 2023-01-17 ASSESSMENT — PATIENT HEALTH QUESTIONNAIRE - PHQ9
10. IF YOU CHECKED OFF ANY PROBLEMS, HOW DIFFICULT HAVE THESE PROBLEMS MADE IT FOR YOU TO DO YOUR WORK, TAKE CARE OF THINGS AT HOME, OR GET ALONG WITH OTHER PEOPLE: SOMEWHAT DIFFICULT
SUM OF ALL RESPONSES TO PHQ QUESTIONS 1-9: 3
SUM OF ALL RESPONSES TO PHQ QUESTIONS 1-9: 3

## 2023-01-17 ASSESSMENT — ANXIETY QUESTIONNAIRES
1. FEELING NERVOUS, ANXIOUS, OR ON EDGE: SEVERAL DAYS
5. BEING SO RESTLESS THAT IT IS HARD TO SIT STILL: MORE THAN HALF THE DAYS
6. BECOMING EASILY ANNOYED OR IRRITABLE: SEVERAL DAYS
GAD7 TOTAL SCORE: 9
3. WORRYING TOO MUCH ABOUT DIFFERENT THINGS: SEVERAL DAYS
4. TROUBLE RELAXING: SEVERAL DAYS
8. IF YOU CHECKED OFF ANY PROBLEMS, HOW DIFFICULT HAVE THESE MADE IT FOR YOU TO DO YOUR WORK, TAKE CARE OF THINGS AT HOME, OR GET ALONG WITH OTHER PEOPLE?: SOMEWHAT DIFFICULT
7. FEELING AFRAID AS IF SOMETHING AWFUL MIGHT HAPPEN: SEVERAL DAYS
IF YOU CHECKED OFF ANY PROBLEMS ON THIS QUESTIONNAIRE, HOW DIFFICULT HAVE THESE PROBLEMS MADE IT FOR YOU TO DO YOUR WORK, TAKE CARE OF THINGS AT HOME, OR GET ALONG WITH OTHER PEOPLE: SOMEWHAT DIFFICULT
GAD7 TOTAL SCORE: 9
GAD7 TOTAL SCORE: 9
7. FEELING AFRAID AS IF SOMETHING AWFUL MIGHT HAPPEN: SEVERAL DAYS
2. NOT BEING ABLE TO STOP OR CONTROL WORRYING: MORE THAN HALF THE DAYS

## 2023-01-17 NOTE — PATIENT INSTRUCTIONS
PLAN AND RECOMMENDATIONS:    Continue  Prozac 60 mg once daily for anxiety   Continue Adderall XL 40 mg in the morning in addition to Adderall 10 mg immediate release for ADHD  Continue taking Adderall 10 mg twice daily around noon and before 4pm for ADHD    Patient will not stop taking medications or adjust them without consulting with the provider.  2.Patient will call with any problems between visits.  3.Patient will go to the emergency room if not feeling safe , unable to function in the community, or if suicidal, homicidal or hearing voices or having paranoia.  4.Patient will abstain from drugs and alcohol./Pt denies use .  5.Patient will not drive if sedated on medications or under influence of any substance.    6.Patient will not mix psychiatric medications with drugs and alcohol.   7.Patient will watch his diet and exercise.  8.Patient will see non psychiatric providers for non psychiatric disorders.  9. Next appointment in one  month         **For crisis resources, please see the information at the end of this document**   Patient Education    Thank you for coming to the [facility].     Lab Testing:  If you had lab testing today and your results are reassuring or normal they will be mailed to you or sent through [employer] within 7 days. If the lab tests need quick action we will call you with the results. The phone number we will call with results is # [phone]. If this is not the best number please call our clinic and change the number.     Medication Refills:  If you need any refills please call your pharmacy and they will contact us. Our fax number for refills is [phone].   Three business days of notice are needed for general medication refill requests.   Five business days of notice are needed for controlled substance refill requests.   If you need to change to a different pharmacy, please contact the new pharmacy directly. The new pharmacy  will help you get your medications transferred.     Contact Us:  Please call [phone] during business hours (8-5:00 M-F).   If you have medication related questions after clinic hours, or on the weekend, please call [phone].     Financial Assistance [phone]   Medical Records [phone]       MENTAL HEALTH CRISIS RESOURCES:  For a emergency help, please call 911 or go to the nearest Emergency Department.     Emergency Walk-In Options:   [facility] Unit @ [facility] ([place]): [phone] - Specialized mental health emergency area designed to be calming  [facility] ([place]): [phone]  [facility] ([place]): [phone]  [facility] ([place]): [phone]    [facility] Crisis Information:   [place]: [phone]  [place]: [phone]  [place] ([place]AC[place]) - Adult: [phone]     Child: [phone]  [place] - Adult: [phone]     Child: [phone]  [place]: [phone]  List of all [facility] resources:   https://[place].gov/dhs/people-we-serve/adults/health-care/mental-health/resources/crisis-contacts.jsp    National Crisis Information:   Crisis Text Line: Text  [place]  to 171663  Suicide & Crisis Lifeline: 988  National Suicide Prevention Lifeline: 3-743-169-TALK (1-347.147.9376)       For online chat options, visit https://suicidepreventionlifeline.org/chat/  Poison Control Center: 1-179.383.1397  Trans Lifeline: 6-783-983-1066 - Hotline for transgender people of all ages  [employer]: 1-389-501-2610 - Hotline for LGBT youth     For Non-Emergency Support:   Fast Tracker: Mental Health & Substance Use Disorder Resources -   https://www.SocietyOnen.org/
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
## 2023-01-17 NOTE — NURSING NOTE
Pt reported no longer taking alprazolam 0.5 mg and liraglutide 18 mg/3 ml solution.    MILAGRO Sauceda January 17, 2023 9:00 AM

## 2023-01-17 NOTE — PROGRESS NOTES
Katia Rizvi is a 27 year old who is being evaluated via a billable video visit.      Pt will join video visit via: [employer]  If there are problems joining the visit, send backup video invite via: Text to preferred phone: [phone]    Reason for telehealth visit: Patient has requested telehealth visit    Originating location (patient location): Patient's home    Will anyone else be joining the visit? No      Answers for HPI/ROS submitted by the patient on 1/17/2023  If you checked off any problems, how difficult have these problems made it for you to do your work, take care of things at home, or get along with other people?: Somewhat difficult  PHQ9 TOTAL SCORE: 3  ODALIS 7 TOTAL SCORE: 9

## 2023-03-17 ENCOUNTER — VIRTUAL VISIT (OUTPATIENT)
Dept: PSYCHIATRY | Facility: CLINIC | Age: 28
End: 2023-03-17
Payer: COMMERCIAL

## 2023-03-17 DIAGNOSIS — F33.1 MAJOR DEPRESSIVE DISORDER, RECURRENT EPISODE, MODERATE (H): ICD-10-CM

## 2023-03-17 DIAGNOSIS — F41.1 GAD (GENERALIZED ANXIETY DISORDER): Primary | ICD-10-CM

## 2023-03-17 DIAGNOSIS — F90.2 ATTENTION DEFICIT HYPERACTIVITY DISORDER (ADHD), COMBINED TYPE: ICD-10-CM

## 2023-03-17 PROCEDURE — 99214 OFFICE O/P EST MOD 30 MIN: CPT | Mod: VID | Performed by: NURSE PRACTITIONER

## 2023-03-17 RX ORDER — LAMOTRIGINE 25 MG/1
25 TABLET ORAL DAILY
Qty: 120 TABLET | Refills: 0 | Status: SHIPPED | OUTPATIENT
Start: 2023-03-17

## 2023-03-17 ASSESSMENT — PATIENT HEALTH QUESTIONNAIRE - PHQ9
SUM OF ALL RESPONSES TO PHQ QUESTIONS 1-9: 4
SUM OF ALL RESPONSES TO PHQ QUESTIONS 1-9: 4
10. IF YOU CHECKED OFF ANY PROBLEMS, HOW DIFFICULT HAVE THESE PROBLEMS MADE IT FOR YOU TO DO YOUR WORK, TAKE CARE OF THINGS AT HOME, OR GET ALONG WITH OTHER PEOPLE: SOMEWHAT DIFFICULT

## 2023-03-17 ASSESSMENT — ANXIETY QUESTIONNAIRES
5. BEING SO RESTLESS THAT IT IS HARD TO SIT STILL: SEVERAL DAYS
3. WORRYING TOO MUCH ABOUT DIFFERENT THINGS: MORE THAN HALF THE DAYS
GAD7 TOTAL SCORE: 12
GAD7 TOTAL SCORE: 12
1. FEELING NERVOUS, ANXIOUS, OR ON EDGE: MORE THAN HALF THE DAYS
IF YOU CHECKED OFF ANY PROBLEMS ON THIS QUESTIONNAIRE, HOW DIFFICULT HAVE THESE PROBLEMS MADE IT FOR YOU TO DO YOUR WORK, TAKE CARE OF THINGS AT HOME, OR GET ALONG WITH OTHER PEOPLE: VERY DIFFICULT
7. FEELING AFRAID AS IF SOMETHING AWFUL MIGHT HAPPEN: SEVERAL DAYS
6. BECOMING EASILY ANNOYED OR IRRITABLE: MORE THAN HALF THE DAYS
7. FEELING AFRAID AS IF SOMETHING AWFUL MIGHT HAPPEN: SEVERAL DAYS
8. IF YOU CHECKED OFF ANY PROBLEMS, HOW DIFFICULT HAVE THESE MADE IT FOR YOU TO DO YOUR WORK, TAKE CARE OF THINGS AT HOME, OR GET ALONG WITH OTHER PEOPLE?: VERY DIFFICULT
2. NOT BEING ABLE TO STOP OR CONTROL WORRYING: MORE THAN HALF THE DAYS
4. TROUBLE RELAXING: MORE THAN HALF THE DAYS
GAD7 TOTAL SCORE: 12

## 2023-03-17 NOTE — NURSING NOTE
Is the patient currently in the state of [place]? YES    Visit mode:VIDEO    If the visit is dropped, the patient can be reconnected by: VIDEO VISIT: Text to cell phone:   Telephone Information:   Mobile [phone]       Will anyone else be joining the visit? No  (If patient encounters technical issues they should call [phone])    How would you like to obtain your AVS? MyChart    Are changes needed to the allergy or medication list? NO    Rooming Documentation: Patient will complete qnrs in mychart    Reason for visit:  Follow Up    LIDIA PondF     Not able to view meds due to time.

## 2023-03-17 NOTE — PROGRESS NOTES
Answers for HPI/ROS submitted by the patient on 3/17/2023  If you checked off any problems, how difficult have these problems made it for you to do your work, take care of things at home, or get along with other people?: Somewhat difficult  PHQ9 TOTAL SCORE: 4  ODALIS 7 TOTAL SCORE: 12

## 2023-03-17 NOTE — PATIENT INSTRUCTIONS
PLAN AND RECOMMENDATIONS:  Take Lamictal 1 tablet (25mg) for two weeks, then take 2 tablets (50mg) after two weeks for mood and anxiety.  Continue  Prozac 60 mg once daily for anxiety   Continue Adderall XL 40 mg in the morning in addition to Adderall 10 mg immediate release for ADHD  Continue taking Adderall 10 mg twice daily around noon and before 4pm for ADHD    Patient will not stop taking medications or adjust them without consulting with the provider.  2.Patient will call with any problems between visits.  3.Patient will go to the emergency room if not feeling safe , unable to function in the community, or if suicidal, homicidal or hearing voices or having paranoia.  4.Patient will abstain from drugs and alcohol./Pt denies use .  5.Patient will not drive if sedated on medications or under influence of any substance.    6.Patient will not mix psychiatric medications with drugs and alcohol.   7.Patient will watch his diet and exercise.  8.Patient will see non psychiatric providers for non psychiatric disorders.  9. Next appointment in one month   We have discussed his/her diagnosis, prognosis, differential diagnosis and side effects and benefits of medications.
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
## 2023-03-30 DIAGNOSIS — F33.1 MAJOR DEPRESSIVE DISORDER, RECURRENT EPISODE, MODERATE (H): ICD-10-CM

## 2023-03-30 DIAGNOSIS — F90.2 ADHD (ATTENTION DEFICIT HYPERACTIVITY DISORDER), COMBINED TYPE: ICD-10-CM

## 2023-03-30 NOTE — TELEPHONE ENCOUNTER
Date of Last Office Visit: 03/17/2023  Date of Next Office Visit: not scheduled   No shows since last visit: none  Cancellations since last visit: none    Medication requested: Adderall 20mg Cap  Date last ordered: 01/17/2023 Qty: 60 Refills: 0    Medication requested: Adderall 10mg Cap  Date last ordered: 01/17/2023 Qty: 60 Refills: 0    Medication requested: Fluoxetine 40mg Cap  Date last ordered: 11/04/2022 Qty: 30 Refills: 3    Medication requested: Fluoxetine 20mg Cap  Date last ordered: 11/04/2022 Qty: 30 Refills: 3     Review of [place] ?: no- not a delegate     Lapse in medication adherence greater than 5 days?: no  If yes, call patient and gather details: n/a   Medication refill request verified as identical to current order?: yes  Result of Last DAM, VPA, Li+ Level, CBC, or Carbamazepine Level (at or since last visit): N/A    Last visit treatment plan:     ASSESSMENT AND PLAN     Patient is a 26 year old female with the history of ADHD and generalized anxiety disorder here today for follow up visit.  She has been experiencing increased mood and emotional reactivity in the form of increased irritability, poor frustration and anger management for heightened anxiety for and agitation.  She does not believe the current medication regimen are effective for current symptoms.  I started her on Lamictal 25 mg for 2 weeks and increase to 50 mg after 2 weeks to effectively manage mood and emotional reactivity while augmenting Prozac.  She denies SI, SIB, HI.  She also denied both auditory and visual hallucination.  She reports no lisa or hypomania.  Return to clinic in 4 weeks for follow-up.  Patient and I reviewed diagnosis and treatment plan and patient agrees with following recommendations:     PLAN AND RECOMMENDATIONS:  Take Lamictal 1 tablet (25mg) for two weeks, then take 2 tablets (50mg) after two weeks for mood and anxiety.  Continue  Prozac 60 mg once daily for anxiety   Continue Adderall XL 40 mg in the  morning in addition to Adderall 10 mg immediate release for ADHD  Continue taking Adderall 10 mg twice daily around noon and before 4pm for ADHD     Patient will not stop taking medications or adjust them without consulting with the provider.  2.Patient will call with any problems between visits.  3.Patient will go to the emergency room if not feeling safe , unable to function in the community, or if suicidal, homicidal or hearing voices or having paranoia.  4.Patient will abstain from drugs and alcohol./Pt denies use .  5.Patient will not drive if sedated on medications or under influence of any substance.    6.Patient will not mix psychiatric medications with drugs and alcohol.   7.Patient will watch his diet and exercise.  8.Patient will see non psychiatric providers for non psychiatric disorders.  9. Next appointment in one month   We have discussed his/her diagnosis, prognosis, differential diagnosis and side effects and benefits of medications.           []Medication refilled per  Medication Refill in Ambulatory Care  policy.  [x]Medication unable to be refilled by RN due to criteria not met as indicated below:    []Eligibility - not seen in the last year   []Supervision - no future appointment   []Compliance - no shows, cancellations or lapse in therapy   []Verification - order discrepancy   [x]Controlled medication   [x]Medication not included in policy   []90-day supply request   []Other

## 2023-03-30 NOTE — TELEPHONE ENCOUNTER
Reason for call:  Medication   If this is a refill request, has the caller requested the refill from the pharmacy already? No  Will the patient be using a [facility]? No  Name of the pharmacy and phone number for the current request: [facility] #[id number] - [place], [place] - [address] [phone]    Name of the medication requested: amphetamine-dextroamphetamine (ADDERALL XR) 20 MG 24 hr capsule    amphetamine-dextroamphetamine (ADDERALL) 10 MG tablet    FLUoxetine (PROZAC) 40 MG capsule    FLUoxetine (PROZAC) 20 MG capsule    Other request:     Phone number to reach patient:  Home number on file [phone] (home)    Best Time:  ASAP    Can we leave a detailed message on this number?  YES    Travel screening: Not Applicable

## 2023-03-31 ENCOUNTER — MYC REFILL (OUTPATIENT)
Dept: PSYCHIATRY | Facility: CLINIC | Age: 28
End: 2023-03-31
Payer: COMMERCIAL

## 2023-03-31 DIAGNOSIS — F90.2 ADHD (ATTENTION DEFICIT HYPERACTIVITY DISORDER), COMBINED TYPE: ICD-10-CM

## 2023-03-31 DIAGNOSIS — F33.1 MAJOR DEPRESSIVE DISORDER, RECURRENT EPISODE, MODERATE (H): ICD-10-CM

## 2023-03-31 DIAGNOSIS — F41.1 GAD (GENERALIZED ANXIETY DISORDER): ICD-10-CM

## 2023-04-03 RX ORDER — DEXTROAMPHETAMINE SACCHARATE, AMPHETAMINE ASPARTATE, DEXTROAMPHETAMINE SULFATE AND AMPHETAMINE SULFATE 2.5; 2.5; 2.5; 2.5 MG/1; MG/1; MG/1; MG/1
10 TABLET ORAL 2 TIMES DAILY
Qty: 60 TABLET | Refills: 0 | Status: SHIPPED | OUTPATIENT
Start: 2023-04-03 | End: 2023-04-06

## 2023-04-03 RX ORDER — LAMOTRIGINE 25 MG/1
25 TABLET ORAL DAILY
Qty: 120 TABLET | Refills: 0 | OUTPATIENT
Start: 2023-04-03

## 2023-04-03 RX ORDER — FLUOXETINE 40 MG/1
40 CAPSULE ORAL DAILY
Qty: 30 CAPSULE | Refills: 1 | Status: SHIPPED | OUTPATIENT
Start: 2023-04-03

## 2023-04-03 RX ORDER — DEXTROAMPHETAMINE SACCHARATE, AMPHETAMINE ASPARTATE, DEXTROAMPHETAMINE SULFATE AND AMPHETAMINE SULFATE 2.5; 2.5; 2.5; 2.5 MG/1; MG/1; MG/1; MG/1
10 TABLET ORAL 2 TIMES DAILY
Qty: 60 TABLET | Refills: 0 | OUTPATIENT
Start: 2023-04-03

## 2023-04-03 RX ORDER — DEXTROAMPHETAMINE SACCHARATE, AMPHETAMINE ASPARTATE MONOHYDRATE, DEXTROAMPHETAMINE SULFATE AND AMPHETAMINE SULFATE 5; 5; 5; 5 MG/1; MG/1; MG/1; MG/1
40 CAPSULE, EXTENDED RELEASE ORAL DAILY
Qty: 60 CAPSULE | Refills: 0 | Status: SHIPPED | OUTPATIENT
Start: 2023-04-03 | End: 2023-04-06

## 2023-04-03 RX ORDER — DEXTROAMPHETAMINE SACCHARATE, AMPHETAMINE ASPARTATE MONOHYDRATE, DEXTROAMPHETAMINE SULFATE AND AMPHETAMINE SULFATE 5; 5; 5; 5 MG/1; MG/1; MG/1; MG/1
40 CAPSULE, EXTENDED RELEASE ORAL DAILY
Qty: 60 CAPSULE | Refills: 0 | OUTPATIENT
Start: 2023-04-03

## 2023-04-03 NOTE — TELEPHONE ENCOUNTER
RN called patient to assess medication status. Patient reports the followin. She has not yet picked up her lamotrigine from the pharmacy and is hoping to pick this up with her other medications.     2. She ran out of her amphetamine-dextroamphetamine both 10 mg IR and 20 mg XR 'a while ago' and they did not get filled at her     3. She states she has 2 days left of her 20 mg and 40 mg fluoxetine.     4. She reports she had a death in the family and has a flight to [place] at 16:00. She is requesting medications go to the following pharmacy:    [id number] [place] [place] [address] [facility] [address]    She is not sure she will be able to get transportation to her pharmacy to  medications today.     RN called [facility] and they can accept the controlled substance refills. They also state they can transfer a controlled substance from a [place] pharmacy as long as the script has been filled at the home pharmacy at least once.     RN called patient back and requested she connect with her pharmacy here in [place] and  medications prior to leaving and if she cannot, she can connect with a pharmacy of choice while in [place] and have scripts pulled to that pharmacy. RN encouraged her to reach back out to this clinic if she has any issues via call or [employer] message.     Routing to provider high priority.    Joselin Castillo RN on 4/3/2023 at 11:16 AM

## 2023-04-03 NOTE — TELEPHONE ENCOUNTER
Duplicate request for amphetamine scripts. Patient has not yet picked up lamotrigine from original order.     See encounter from 3/30/2023 for more details.     RN will deny these duplicate requests.     Joselin Castillo RN on 4/3/2023 at 11:38 AM

## 2023-04-04 ENCOUNTER — TELEPHONE (OUTPATIENT)
Dept: PHARMACY | Facility: PHYSICIAN GROUP | Age: 28
End: 2023-04-04
Payer: COMMERCIAL

## 2023-04-04 NOTE — TELEPHONE ENCOUNTER
This patient is due for MTM follow-up. I called the patient to schedule an appointment and left a message with the clinic phone number for the patient to call to schedule.    Trena Sherwood, PharmD, [facility]  Medication Therapy Management Pharmacist  Pager: [phone]

## 2023-04-05 ENCOUNTER — TELEPHONE (OUTPATIENT)
Dept: PSYCHIATRY | Facility: CLINIC | Age: 28
End: 2023-04-05

## 2023-04-05 NOTE — TELEPHONE ENCOUNTER
Reason for call:  Medication     If this is a refill request, has the caller requested the refill from the pharmacy already? Yes    Will the patient be using a [facility]? No    Name of the pharmacy and phone number for the current request: [facility] [phone]    Name of the medication requested: Adderall    Other request: Received call from patient requesting that her medication be sent to [facility] in [place]; she stated she did not pick it up in [place], due had let [facility] for family emergency. Patient stated she will be returning to [place] next Tuesday. The patient stated she had spoken to a nurse regarding the leaving out of [facility].     Phone number to reach patient:  [phone]    Best Time: ASAP    Can we leave a detailed message on this number?  Yes

## 2023-04-06 DIAGNOSIS — F90.2 ADHD (ATTENTION DEFICIT HYPERACTIVITY DISORDER), COMBINED TYPE: ICD-10-CM

## 2023-04-06 RX ORDER — DEXTROAMPHETAMINE SACCHARATE, AMPHETAMINE ASPARTATE, DEXTROAMPHETAMINE SULFATE AND AMPHETAMINE SULFATE 2.5; 2.5; 2.5; 2.5 MG/1; MG/1; MG/1; MG/1
10 TABLET ORAL 2 TIMES DAILY
Qty: 60 TABLET | Refills: 0 | Status: SHIPPED | OUTPATIENT
Start: 2023-04-06

## 2023-04-06 RX ORDER — DEXTROAMPHETAMINE SACCHARATE, AMPHETAMINE ASPARTATE MONOHYDRATE, DEXTROAMPHETAMINE SULFATE AND AMPHETAMINE SULFATE 5; 5; 5; 5 MG/1; MG/1; MG/1; MG/1
40 CAPSULE, EXTENDED RELEASE ORAL DAILY
Qty: 60 CAPSULE | Refills: 0 | Status: SHIPPED | OUTPATIENT
Start: 2023-04-06

## 2023-04-06 NOTE — TELEPHONE ENCOUNTER
RN reviewed this patients request to have her amphetamine-dextroamphetamine (ADDERALL) 10 MG tablet and amphetamine-dextroamphetamine (ADDERALL XR) 20 MG 24 hr capsule transferred to a new pharmacy in [place].      1. RN reviewed the [place] .  RN verified that this patients most recent prescriptions for amphetamine-dextroamphetamine (ADDERALL XR) 20 MG 24 hr capsule and amphetamine-dextroamphetamine (ADDERALL) 10 MG tablet written 4/3/23 were not sold.      2.  RN submitted these prescriptions to Bill Alvarado CNP for his review and pended it to the patients requested pharmacy  [id number].

## 2023-04-06 NOTE — TELEPHONE ENCOUNTER
Reason for call:  Other   Patient called regarding (reason for call): prescription  Additional comments: Ghislaine from [facility] pharmacy called regarding the issue of transferring medications. In their state the medications are considered narcotics and cannot be transferred between pharmacies, they need to be e-transcribed directly to the new pharmacy,    Phone number to reach [facility]: [phone]    Best Time:  ASAP    Can we leave a detailed message on this number?  Not Applicable    Travel screening: Not Applicable

## 2023-04-06 NOTE — TELEPHONE ENCOUNTER
Left Voicemail for patient. I told her that another RN had called the [facility] in [place] and they stated they could transfer controlled medications from [place] pharmacy. Told patient to call the [facility] and have them transfer the scripts down there.     Mignon Viveros RN on 4/6/2023 at 12:21 PM

## 2023-04-12 DIAGNOSIS — F41.1 GAD (GENERALIZED ANXIETY DISORDER): ICD-10-CM

## 2023-04-12 DIAGNOSIS — F33.1 MAJOR DEPRESSIVE DISORDER, RECURRENT EPISODE, MODERATE (H): ICD-10-CM

## 2023-04-14 RX ORDER — LAMOTRIGINE 25 MG/1
TABLET ORAL
Qty: 120 TABLET | Refills: 0 | OUTPATIENT
Start: 2023-04-14

## 2023-04-14 NOTE — TELEPHONE ENCOUNTER
RN received a refill request from [facility]Y #[id number] - [place], [place] - [address] for Lamictal 25 mg tablet.  This refill request was faxed DENIED back to [facility] #[id number] - [place], [place] - [address] for the following reasons:    1. Too early to fill.  This medication was last filled 3/17/23 for approximately a 2 month supply.      ___________________________________________________     Please refer to this prescription to substantiate this prescription should last approximately 60 days.      lamoTRIgine (LAMICTAL) 25 MG tablet 120 tablet 0 3/17/2023  --   Sig - Route: Take 1 tablet (25 mg) by mouth daily Take Lamictal 1 tablet (25mg) for two weeks, then take 2 tablets (50mg) after two weeks - Oral   Sent to pharmacy as: lamoTRIgine 25 MG Oral Tablet (LaMICtal)   Class: E-Prescribe   Order: [id number]   E-Prescribing Status: Receipt confirmed by pharmacy (3/17/2023 11:24 AM CDT)

## 2023-04-19 NOTE — TELEPHONE ENCOUNTER
Reason for call:  Medication   If this is a refill request, has the caller requested the refill from the pharmacy already? Yes  Will the patient be using a [facility]? No  Name of the pharmacy and phone number for the current request: [facility] [phone]    Name of the medication requested: Adderall, both prescriptions    Other request: pt did not get the med in [place]. Pt has been out of meds for about 2 weeks.    Phone number to reach patient:  Cell number on file:    Telephone Information:   Mobile [phone]       Best Time:  asap    Can we leave a detailed message on this number?  YES    Travel screening: Not Applicable

## 2023-04-23 ENCOUNTER — HEALTH MAINTENANCE LETTER (OUTPATIENT)
Age: 28
End: 2023-04-23

## 2023-04-27 DIAGNOSIS — F33.1 MAJOR DEPRESSIVE DISORDER, RECURRENT EPISODE, MODERATE (H): ICD-10-CM

## 2023-04-28 RX ORDER — FLUOXETINE 40 MG/1
CAPSULE ORAL
Qty: 30 CAPSULE | Refills: 1 | OUTPATIENT
Start: 2023-04-28

## 2023-04-28 NOTE — TELEPHONE ENCOUNTER
Refill available from 4/4/23 fill at mail [facility] pharmacy [id number]. [employer] message left for patient. [facility] local [facility].

## 2023-05-23 NOTE — TELEPHONE ENCOUNTER
We have been unable to reach this patient for MTM follow-up after several attempts. We will stop reaching out to the patient at this time. Please let us know if we can assist in this patient's care in the future.    Astrid BowmanD, [facility]  Medication Therapy Management Pharmacist  Pager: [phone]

## 2024-04-27 ENCOUNTER — HEALTH MAINTENANCE LETTER (OUTPATIENT)
Age: 29
End: 2024-04-27

## 2024-11-05 ASSESSMENT — ANXIETY QUESTIONNAIRES: GAD7 TOTAL SCORE: 8

## 2025-05-11 ENCOUNTER — HEALTH MAINTENANCE LETTER (OUTPATIENT)
Age: 30
End: 2025-05-11

## (undated) DEVICE — CONNECTOR STOPCOCK 3 WAY MALE LL HI-FLO MX9311L

## (undated) DEVICE — SOL NACL 0.9% INJ 1000ML BAG 2B1324X

## (undated) DEVICE — PREP CHLORAPREP 26ML TINTED ORANGE  260815

## (undated) DEVICE — SYSTEM CLEARIFY VISUALIZATION 21-345

## (undated) DEVICE — SUCTION IRR STRYKERFLOW II W/TIP 250-070-520

## (undated) DEVICE — SU MONOCRYL 4-0 PS-2 18" UND Y496G

## (undated) DEVICE — SYR 20ML LL W/O NDL

## (undated) DEVICE — ENDO TROCAR FIRST ENTRY KII FIOS Z-THRD 12X100MM CTF73

## (undated) DEVICE — SYR 10ML LL W/O NDL 302995

## (undated) DEVICE — CATH CHOLANGIOGRAM 4.5FR TAUT METAL TIP 20018-M55

## (undated) DEVICE — CUP AND LID 2PK 2OZ STERILE  SSK9006A

## (undated) DEVICE — ESU GROUND PAD ADULT W/CORD E7507

## (undated) DEVICE — ENDO TROCAR SLEEVE KII Z-THREADED 05X100MM CTS02

## (undated) DEVICE — LINEN BASIC PACK 5468

## (undated) DEVICE — SUCTION CANISTER MEDIVAC LINER 3000ML W/LID 65651-530

## (undated) DEVICE — PACK LAP CHOLE SLC15LCFSD

## (undated) DEVICE — CONNECTOR STOPCOCK 3 WAY MALE LL 2C6204

## (undated) DEVICE — SYR 03ML 22GA 1.5" ECLIPSE

## (undated) DEVICE — SU VICRYL 3-0 SH 27" J316H

## (undated) DEVICE — ENDO TROCAR FIRST ENTRY KII FIOS Z-THRD 05X100MM CTF03

## (undated) DEVICE — SOL WATER IRRIG 1000ML BOTTLE 2F7114

## (undated) DEVICE — EVAC SYSTEM CLEAR FLOW SC082500

## (undated) DEVICE — SUCTION CANISTER STRAW 65652-008

## (undated) DEVICE — SOL NACL 0.9% IRRIG 1000ML BOTTLE 2F7124

## (undated) DEVICE — SU VICRYL 0 UR-6 27" J603H

## (undated) DEVICE — CLIP APPLIER ENDO 5MM M/L LIGAMAX EL5ML

## (undated) DEVICE — SYR 01ML 25GA 5/8" TBC

## (undated) DEVICE — RAD RX OPTIRAY 320 (50ML) IOVERSOL 68% CHARGE PER ML

## (undated) DEVICE — ESU GROUND PAD UNIVERSAL W/O CORD

## (undated) DEVICE — BASIN SET MINOR DISP

## (undated) DEVICE — PACK SET-UP STD 9102

## (undated) DEVICE — DRAPE C-ARM 60X42" 1013

## (undated) DEVICE — GLOVE PROTEXIS BLUE W/NEU-THERA 6.5  2D73EB65

## (undated) DEVICE — ESU HOLDER LAP INST DISP PURPLE LONG 330MM H-PRO-330

## (undated) DEVICE — PEN MARKING SKIN W/LABELS 31145884

## (undated) DEVICE — ENDO POUCH UNIV RETRIEVAL SYSTEM INZII 10MM CD001

## (undated) DEVICE — LINEN TOWEL PACK X5 5464

## (undated) DEVICE — GLOVE PROTEXIS MICRO 6.0  2D73PM60

## (undated) DEVICE — SYR 20ML LL W/O NDL 302830

## (undated) RX ORDER — VECURONIUM BROMIDE 1 MG/ML
INJECTION, POWDER, LYOPHILIZED, FOR SOLUTION INTRAVENOUS
Status: DISPENSED
Start: 2019-06-20

## (undated) RX ORDER — PROPOFOL 10 MG/ML
INJECTION, EMULSION INTRAVENOUS
Status: DISPENSED
Start: 2019-06-20

## (undated) RX ORDER — HYDROMORPHONE HYDROCHLORIDE 1 MG/ML
INJECTION, SOLUTION INTRAMUSCULAR; INTRAVENOUS; SUBCUTANEOUS
Status: DISPENSED
Start: 2019-06-19

## (undated) RX ORDER — KETOROLAC TROMETHAMINE 30 MG/ML
INJECTION, SOLUTION INTRAMUSCULAR; INTRAVENOUS
Status: DISPENSED
Start: 2019-06-19

## (undated) RX ORDER — ONDANSETRON 2 MG/ML
INJECTION INTRAMUSCULAR; INTRAVENOUS
Status: DISPENSED
Start: 2019-06-19

## (undated) RX ORDER — GLYCOPYRROLATE 0.2 MG/ML
INJECTION, SOLUTION INTRAMUSCULAR; INTRAVENOUS
Status: DISPENSED
Start: 2019-06-19

## (undated) RX ORDER — GLYCOPYRROLATE 0.2 MG/ML
INJECTION, SOLUTION INTRAMUSCULAR; INTRAVENOUS
Status: DISPENSED
Start: 2019-06-20

## (undated) RX ORDER — LIDOCAINE HYDROCHLORIDE 20 MG/ML
INJECTION, SOLUTION EPIDURAL; INFILTRATION; INTRACAUDAL; PERINEURAL
Status: DISPENSED
Start: 2019-06-20

## (undated) RX ORDER — FENTANYL CITRATE 50 UG/ML
INJECTION, SOLUTION INTRAMUSCULAR; INTRAVENOUS
Status: DISPENSED
Start: 2019-06-19

## (undated) RX ORDER — NEOSTIGMINE METHYLSULFATE 1 MG/ML
VIAL (ML) INJECTION
Status: DISPENSED
Start: 2019-06-20

## (undated) RX ORDER — DEXAMETHASONE SODIUM PHOSPHATE 4 MG/ML
INJECTION, SOLUTION INTRA-ARTICULAR; INTRALESIONAL; INTRAMUSCULAR; INTRAVENOUS; SOFT TISSUE
Status: DISPENSED
Start: 2019-06-20

## (undated) RX ORDER — MEPERIDINE HYDROCHLORIDE 25 MG/ML
INJECTION INTRAMUSCULAR; INTRAVENOUS; SUBCUTANEOUS
Status: DISPENSED
Start: 2019-06-19

## (undated) RX ORDER — PIPERACILLIN SODIUM, TAZOBACTAM SODIUM 3; .375 G/15ML; G/15ML
INJECTION, POWDER, LYOPHILIZED, FOR SOLUTION INTRAVENOUS
Status: DISPENSED
Start: 2019-06-19

## (undated) RX ORDER — PROPOFOL 10 MG/ML
INJECTION, EMULSION INTRAVENOUS
Status: DISPENSED
Start: 2019-06-19

## (undated) RX ORDER — FENTANYL CITRATE 50 UG/ML
INJECTION, SOLUTION INTRAMUSCULAR; INTRAVENOUS
Status: DISPENSED
Start: 2019-06-20

## (undated) RX ORDER — HYDROMORPHONE HYDROCHLORIDE 1 MG/ML
INJECTION, SOLUTION INTRAMUSCULAR; INTRAVENOUS; SUBCUTANEOUS
Status: DISPENSED
Start: 2019-06-20